# Patient Record
Sex: FEMALE | Race: WHITE | Employment: FULL TIME | URBAN - METROPOLITAN AREA
[De-identification: names, ages, dates, MRNs, and addresses within clinical notes are randomized per-mention and may not be internally consistent; named-entity substitution may affect disease eponyms.]

---

## 2020-02-21 ENCOUNTER — OFFICE VISIT (OUTPATIENT)
Dept: ENT CLINIC | Age: 30
End: 2020-02-21
Payer: COMMERCIAL

## 2020-02-21 VITALS
WEIGHT: 113 LBS | DIASTOLIC BLOOD PRESSURE: 72 MMHG | HEIGHT: 68 IN | TEMPERATURE: 97.9 F | SYSTOLIC BLOOD PRESSURE: 109 MMHG | HEART RATE: 76 BPM | BODY MASS INDEX: 17.13 KG/M2

## 2020-02-21 PROCEDURE — 31575 DIAGNOSTIC LARYNGOSCOPY: CPT | Performed by: OTOLARYNGOLOGY

## 2020-02-21 PROCEDURE — 99203 OFFICE O/P NEW LOW 30 MIN: CPT | Performed by: OTOLARYNGOLOGY

## 2020-02-21 NOTE — PROGRESS NOTES
Minutes per session: Not on file    Stress: Not on file   Relationships    Social connections:     Talks on phone: Not on file     Gets together: Not on file     Attends Mosque service: Not on file     Active member of club or organization: Not on file     Attends meetings of clubs or organizations: Not on file     Relationship status: Not on file    Intimate partner violence:     Fear of current or ex partner: Not on file     Emotionally abused: Not on file     Physically abused: Not on file     Forced sexual activity: Not on file   Other Topics Concern    Not on file   Social History Narrative    Not on file       DRUG/FOOD ALLERGIES: Patient has no known allergies. CURRENT MEDICATIONS  Prior to Admission medications    Not on File       REVIEW OF SYSTEMS  The following systems were reviewed and revealed the following in addition to any already discussed in the HPI:    CONSTITUTIONAL: no weight loss, no fever, no night sweats, no chills  EYES: no vision changes, no blurry vision  EARS: no changes in hearing, no otalgia  NOSE: no epistaxis, no rhinorrhea  RESPIRATORY: no  Difficulty breathing, no shortness of breath  CV: no chest pain, no Peripheral vascular disease  HEME: No coagulation disorder, no Bleeding disorder  NEURO: no TIA or stroke-like symptoms  SKIN: No new rashes in the head and neck, no recent skin cancers  MOUTH: No new ulcers, no recent teeth infections  NECK: Abnormal sensation  GASTROINTESTINAL: Dysphasia  PSYCH: No anxiety, no depression      PHYSICAL EXAM  /72 (Site: Left Upper Arm, Position: Sitting, Cuff Size: Medium Adult)   Pulse 76   Temp 97.9 °F (36.6 °C) (Oral)   Ht 5' 8\" (1.727 m)   Wt 113 lb (51.3 kg)   BMI 17.18 kg/m²     GENERAL: No Acute Distress, Alert and Oriented, no Hoarseness, strong voice  EYES: EOMI, Anti-icteric  HENT:   Head: Normocephalic and atraumatic.    Face:  Symmetric, facial nerve intact, no sinus tenderness  Right Ear: Normal external ear, normal external auditory canal, intact tympanic membrane with normal mobility and aerated middle ear  Left Ear: Normal external ear, normal external auditory canal, intact tympanic membrane with normal mobility and aerated middle ear  Mouth/Oral Cavity:  normal lips, Uvula is midline, no mucosal lesions, no trismus, normal dentition, normal salivary quality/flow  Oropharynx/Larynx:  normal oropharynx, absent tonsils, see below  Nose:Normal external nasal appearance. Anterior rhinoscopy shows a rightward deviated septum. Normal turbinates. Normal mucosa   NECK: The patient has a normal range of motion of her neck. I do not see any visible asymmetry. She has some tenderness to palpation in the region of the thyroid, but I do not appreciate a israel thyroid mass. She is relatively thin and I do not appreciate any large lesions. CHEST: Normal respiratory effort, no retractions, breathing comfortably  SKIN: No rashes, normal appearing skin, no evidence of skin lesions/tumors  Neuro:  cranial nerve II-XII intact; normal gait  Cardio:  no edema      PROCEDURE  Flexible laryngoscopy  Afrin was applied the bilateral nasal cavity. Flexible scope was passed to the right nasal cavity. The nasopharynx was normal.  The patient had large lingual tonsils without mass or lesion. She had a little bit of erythema of the supraglottic structures however they appear to be normal with normal vocal cord mobility. Piriforms were normal.        ASSESSMENT/PLAN  1. Pharyngoesophageal dysphagia  I really not sure was causing her dysphasia and this odd sensation that something popped in her neck. This is unusual enough that I am going to evaluate the CT scan with contrast is to rule out something like a thyroid nodule that shifting. She will follow-up with me after the CT scan. - CT SOFT TISSUE NECK W CONTRAST; Future    2. Neck pain  Evaluate with a CT scan as I am not sure the etiology of this patient's complaints.   I think would

## 2020-02-26 ENCOUNTER — OFFICE VISIT (OUTPATIENT)
Dept: ENT CLINIC | Age: 30
End: 2020-02-26
Payer: COMMERCIAL

## 2020-02-26 ENCOUNTER — HOSPITAL ENCOUNTER (OUTPATIENT)
Dept: CT IMAGING | Age: 30
Discharge: HOME | End: 2020-02-26
Payer: COMMERCIAL

## 2020-02-26 VITALS
DIASTOLIC BLOOD PRESSURE: 69 MMHG | HEIGHT: 68 IN | HEART RATE: 75 BPM | WEIGHT: 119 LBS | TEMPERATURE: 97.4 F | SYSTOLIC BLOOD PRESSURE: 101 MMHG | BODY MASS INDEX: 18.04 KG/M2

## 2020-02-26 PROCEDURE — 6360000004 HC RX CONTRAST MEDICATION: Performed by: OTOLARYNGOLOGY

## 2020-02-26 PROCEDURE — 99213 OFFICE O/P EST LOW 20 MIN: CPT | Performed by: OTOLARYNGOLOGY

## 2020-02-26 PROCEDURE — 70491 CT SOFT TISSUE NECK W/DYE: CPT

## 2020-02-26 RX ORDER — OMEPRAZOLE 20 MG/1
20 CAPSULE, DELAYED RELEASE ORAL
Qty: 30 CAPSULE | Refills: 2 | Status: SHIPPED | OUTPATIENT
Start: 2020-02-26

## 2020-02-26 RX ADMIN — IOPAMIDOL 75 ML: 755 INJECTION, SOLUTION INTRAVENOUS at 07:42

## 2020-02-26 NOTE — PROGRESS NOTES
trismus, normal dentition, normal salivary quality/flow  Oropharynx/Larynx:  normal oropharynx, absent tonsils; normal larynx/nasopharynx on mirror exam  Nose:Normal external nasal appearance. Anterior rhinoscopy shows a normal septum. Normal turbinates. Normal mucosa   NECK: Normal range of motion, no thyromegaly, trachea is midline, no lymphadenopathy, no neck masses, no crepitus  CHEST: Normal respiratory effort, no retractions, breathing comfortably  SKIN: No rashes, normal appearing skin, no evidence of skin lesions/tumors  Neuro:  cranial nerve II-XII intact; normal gait  Cardio:  no edema        RADIOLOGY  Summary of findings:  I reviewed the CT scan of the neck she had today and there are no abnormalities noted she has large lingual tonsils as I noted on the flexible endoscopy performed in the last clinic. ASSESSMENT/PLAN  1. Pharyngoesophageal dysphagia  I do not see any concerning lesions on her CT scan. Sometimes lingual tonsillar hypertrophy and the edema noted in her supraglottic structures on my previous exam are secondary to reflux. I would like to try her on 6 weeks of omeprazole to see if it helps. I would like to see her afterwards to see if things are better. 2. Laryngopharyngeal reflux (LPR)  As above. I have performed a head and neck physical exam personally or was physically present during the key or critical portions of the service. Medical Decision Making:   The following items were considered in medical decision making:  Independent review of images  Review / order clinical lab tests  Review / order radiology tests  Decision to obtain old records

## 2020-09-02 ENCOUNTER — HOSPITAL ENCOUNTER (OUTPATIENT)
Dept: GENERAL RADIOLOGY | Age: 30
Discharge: HOME | End: 2020-09-02
Payer: COMMERCIAL

## 2020-09-02 ENCOUNTER — HOSPITAL ENCOUNTER (OUTPATIENT)
Age: 30
Discharge: HOME | End: 2020-09-02
Payer: COMMERCIAL

## 2020-09-02 PROCEDURE — 71046 X-RAY EXAM CHEST 2 VIEWS: CPT

## 2020-09-21 ENCOUNTER — EMPLOYEE WELLNESS (OUTPATIENT)
Dept: OTHER | Age: 30
End: 2020-09-21

## 2020-09-21 LAB
CHOLESTEROL, TOTAL: 190 MG/DL (ref 0–199)
GLUCOSE BLD-MCNC: 84 MG/DL (ref 70–99)
HDLC SERPL-MCNC: 78 MG/DL (ref 40–60)
LDL CHOLESTEROL CALCULATED: 101 MG/DL
TRIGL SERPL-MCNC: 54 MG/DL (ref 0–150)

## 2020-10-19 VITALS — WEIGHT: 116 LBS | BODY MASS INDEX: 17.64 KG/M2

## 2020-10-23 ENCOUNTER — NURSE TRIAGE (OUTPATIENT)
Dept: OTHER | Facility: CLINIC | Age: 30
End: 2020-10-23

## 2020-10-23 NOTE — TELEPHONE ENCOUNTER
symptoms? \" (e.g., dizziness, nausea, vomiting, sweating, fever, difficulty breathing, cough)        Denies dizziness, SOB, nausea, vomiting, fever  The cough is sporadic    11. PREGNANCY: \"Is there any chance you are pregnant? \" \"When was your last menstrual period? \"        n/a    Protocols used: CHEST PAIN-ADULT-[place]

## 2020-11-17 ENCOUNTER — HOSPITAL ENCOUNTER (OUTPATIENT)
Dept: WOMENS IMAGING | Age: 30
Discharge: HOME | End: 2020-11-17
Payer: COMMERCIAL

## 2020-11-17 PROCEDURE — 77080 DXA BONE DENSITY AXIAL: CPT

## 2021-03-12 ENCOUNTER — EVALUATION (OUTPATIENT)
Dept: PHYSICAL THERAPY | Age: 31
End: 2021-03-12
Payer: COMMERCIAL

## 2021-03-12 DIAGNOSIS — M25.561 ACUTE PAIN OF RIGHT KNEE: Primary | ICD-10-CM

## 2021-03-12 DIAGNOSIS — M22.2X1 PATELLOFEMORAL PAIN SYNDROME OF RIGHT KNEE: ICD-10-CM

## 2021-03-12 DIAGNOSIS — R26.2 DIFFICULTY WALKING: ICD-10-CM

## 2021-03-12 PROCEDURE — 97110 THERAPEUTIC EXERCISES: CPT | Performed by: PHYSICAL THERAPIST

## 2021-03-12 PROCEDURE — 97112 NEUROMUSCULAR REEDUCATION: CPT | Performed by: PHYSICAL THERAPIST

## 2021-03-12 PROCEDURE — 97162 PT EVAL MOD COMPLEX 30 MIN: CPT | Performed by: PHYSICAL THERAPIST

## 2021-03-12 PROCEDURE — 97530 THERAPEUTIC ACTIVITIES: CPT | Performed by: PHYSICAL THERAPIST

## 2021-03-12 NOTE — PROGRESS NOTES
[facility], [place]   Phone: [phone]    Fax: [phone]      Physical Therapy Treatment Note/ Progress Report:       Date:  3/12/2021    Patient Name:  Asya Romero    :  1990  MRN: <[id number]>  Restrictions/Precautions:    Medical/Treatment Diagnosis Information:  Diagnosis: R patella tendonitis     Insurance/Certification information:  PT Insurance Information: [address] [place] [address],[address]  Physician Information:  Referring Practitioner: Dr Victorina Acevedo  Has the plan of care been signed (Y/N):        []  Yes  [x]  No 3/12/21    Date of Patient follow up with Physician:  3/15/21      Is this a Progress Report:     []  Yes  [x]  No        If Yes:  Date Range for reporting period:  Beginning 3/12/21  Ending 21    Progress report will be due (10 Rx or 30 days whichever is less): 3/38/84       Recertification will be due (POC Duration  / 90 days whichever is less): 21         Visit # Insurance Allowable Auth Required   1 30 []  Yes []  No        Functional Scale: LEFI 43/80=53.5%   Date assessed:  3/12/2021      Latex Allergy:  [x]NO      []YES  Preferred Language for Healthcare:   [x]English       []other:    Pain level:  3/10     SUBJECTIVE:  See eval    OBJECTIVE: See eval   Observation:    Test measurements:      RESTRICTIONS/PRECAUTIONS: COVID 19; multiple fractures -17 over the years;     Exercises/Interventions:   Exercise/Equipment Resistance/Repetitions Other comments   Cardio/Warm-up     Bike     Treadmill          Stretching     Hamstring Seated 10\"x10    Hip Flexion     ITB W/ towel 10\"x10    Grion     Quad     Inclined Calf 10\"x10    Towel Pull          ROM     Passive     Active     Weight Shift     Weight Hangs     Sheet Pulls     Ankle Pumps           Patellar Glides     Medial     Superior     Inferior          STRENGTH     SLR     Supine 3x10    Prone 2x10    Abduction 2x10    Adducton 2x10    SLR+          Isometrics     Quad sets 10\"x10 over foam roll CKC     Calf raises x30    Wall sits     Step ups     1 leg stand     Squatting     CC TKE     Balance SLS 30\"x2 Cuing on foot position        PRE     Extension  RANGE:   Flexion  RANGE:   Leg Press  RANGE:        Cable Column     Ed given on HEP, POC & expectations x10'    Review of brace fitting x5'    Manual/Modalities                 Therapeutic Exercise and NMR EXR  [x] (64405) Provided verbal/tactile cueing for activities related to strengthening, flexibility, endurance, ROM for improvements in LE, proximal hip, and core control with self care, mobility, lifting, ambulation.  [] (42880) Provided verbal/tactile cueing for activities related to improving balance, coordination, kinesthetic sense, posture, motor skill, proprioception  to assist with LE, proximal hip, and core control in self care, mobility, lifting, ambulation and eccentric single leg control. NMR and Therapeutic Activities:    [x] (44818 or 38140) Provided verbal/tactile cueing for activities related to improving balance, coordination, kinesthetic sense, posture, motor skill, proprioception and motor activation to allow for proper function of core, proximal hip and LE with self care and ADLs  [] (94312) Gait Re-education- Provided training and instruction to the patient for proper LE, core and proximal hip recruitment and positioning and eccentric body weight control with ambulation re-education including up and down stairs     Home Exercise Program:    [x] (33019) Reviewed/Progressed HEP activities related to strengthening, flexibility, endurance, ROM of core, proximal hip and LE for functional self-care, mobility, lifting and ambulation/stair navigation            Written HEP est    Access Code: [email or web address]   URL: [email or web address]   Date: 03/12/2021   Prepared by: Courtney Donohue     Exercises   Gastroc Stretch on Wall - 10 reps - 1 sets - 10 hold - 1x daily - 7x weekly   Seated Table Hamstring Stretch - 10 reps - 1 sets - 10 hold - 1x daily - 7x weekly   Supine ITB Stretch with Strap - 10 reps - 1 sets - 10 hold - 1x daily - 7x weekly   Supine Quadricep Sets - 10 reps - 1 sets - 10 hold - 1x daily - 7x weekly   Small Range Straight Leg Raise - 10 reps - 3 sets - 1x daily - 7x weekly   Sidelying Hip Abduction - 10 reps - 3 sets - 1x daily - 7x weekly   Sidelying Hip Adduction - 10 reps - 3 sets - 1x daily - 7x weekly   Prone Hip Extension - 10 reps - 3 sets - 1x daily - 7x weekly   Standing Heel Raise - 10 reps - 3 sets - 1x daily - 7x weekly   Single Leg Stance - 1 reps - 3 sets - 30 hold - 1x daily - 7x weekly      [] (25073)Reviewed/Progressed HEP activities related to improving balance, coordination, kinesthetic sense, posture, motor skill, proprioception of core, proximal hip and LE for self care, mobility, lifting, and ambulation/stair navigation      Manual Treatments:  PROM / STM / Oscillations-Mobs:  G-I, II, III, IV (PA's, Inf., Post.)  [] (13798) Provided manual therapy to mobilize LE, proximal hip and/or LS spine soft tissue/joints for the purpose of modulating pain, promoting relaxation,  increasing ROM, reducing/eliminating soft tissue swelling/inflammation/restriction, improving soft tissue extensibility and allowing for proper ROM for normal function with self care, mobility, lifting and ambulation. Modalities:  CP x10 min   [] GAME READY (VASO)- for significant edema, swelling, pain control.      Charges:  Timed Code Treatment Minutes: 40   Total Treatment Minutes: 70     [] EVAL (LOW) 92713 (typically 20 minutes face-to-face)  [x] EVAL (MOD) 11070 (typically 30 minutes face-to-face)  [] EVAL (HIGH) 60023 (typically 45 minutes face-to-face)  [] RE-EVAL   [x] UX(34085) 1 x 15    [] IONTO  [x] NMR (85847) 1 x 10    [] VASO  [] Manual (50694) x      [] Other:  [x] TA (51959) 1 x 15 min     [] Mech Traction (90331)  [] ES(attended) (88620)      [] ES (un) (49769):     GOALS:  Patient stated goal: Full ROM w/ activity and not have to wear brace  []? Progressing: []? Met: []? Not Met: []? Adjusted     Therapist goals for Patient:   Short Term Goals: To be achieved in: 2 weeks  1. Independent in HEP and progression per patient tolerance, in order to prevent re-injury. []? Progressing: []? Met: []? Not Met: []? Adjusted   2. Patient will have a decrease in pain to facilitate improvement in movement, function, and ADLs as indicated by Functional Deficits. []? Progressing: []? Met: []? Not Met: []? Adjusted      Long Term Goals: To be achieved in: 12 weeks  1. Disability index score of 10% or less for the LEFS to assist with reaching prior level of function. []? Progressing: []? Met: []? Not Met: []? Adjusted   2. Patient will demonstrate increased AROM to WNL's to allow for proper joint functioning as indicated by patients Functional Deficits. []? Progressing: []? Met: []? Not Met: []? Adjusted   3. Patient will demonstrate an increase in Strength to good proximal hip strength and control, within 5lb HHD in LE to allow for proper functional mobility as indicated by patients Functional Deficits. []? Progressing: []? Met: []? Not Met: []? Adjusted   4. Patient will return to 100% functional activities without increased symptoms or restriction. []? Progressing: []? Met: []? Not Met: []? Adjusted   5. Pt will be able to return to playing volleyball and sand volleyball. (patient specific functional goal)    []? Progressing: []? Met: []? Not Met: []? Adjusted     Overall Progression Towards Functional goals/ Treatment Progress Update:  [] Patient is progressing as expected towards functional goals listed. [] Progression is slowed due to complexities/Impairments listed. [] Progression has been slowed due to co-morbidities.   [x] Plan just implemented, too soon to assess goals progression <30days   [] Goals require adjustment due to lack of progress  [] Patient is not progressing as expected and requires additional follow up with physician  [] Other    ASSESSMENT:  See eval    Treatment/Activity Tolerance:  [] Patient tolerated treatment well [x] Patient limited by fatique  [x] Patient limited by pain  [] Patient limited by other medical complications  [] Other:     Prognosis: [x] Good [] Fair  [] Poor    Patient Requires Follow-up: [x] Yes  [] No    PLAN: See eval  [x] Continue per plan of care [] Alter current plan (see comments)  [x] Plan of care initiated [] Hold pending MD visit [] Discharge    Electronically signed by: Mary Albetrs PT, MS, OMT-C    Physical Therapist [place] license #[id number]  Physical Therapist [place] license #[id number]    Note: If patient does not return for scheduled/ recommended follow up visits, this note will serve as a discharge from care along with most recent update on progress.

## 2021-03-12 NOTE — PROGRESS NOTES
[facility]   Phone: [phone]    Fax: [phone]          Physical Therapy Certification    Dear Referring Practitioner: Dr Nikolai Ervin,    We had the pleasure of evaluating the following patient for physical therapy services at [address] [place] [address]. A summary of our findings can be found in the initial assessment below. This includes our plan of care. If you have any questions or concerns regarding these findings, please do not hesitate to contact me at the office phone number checked above. Thank you for the referral.       Physician Signature:_______________________________Date:__________________  By signing above (or electronic signature), therapists plan is approved by physician      Patient: Zach Barnes   : 1990   MRN: <[id number]>  Referring Physician: Referring Practitioner: Dr Nikolai Ervin      Evaluation Date: 3/12/2021      Medical Diagnosis Information:  Diagnosis: R patella tendonitis                                             Insurance information: PT Insurance Information: [employer]     Precautions/ Contra-indications: none  Latex Allergy:  [x]NO      []YES  Preferred Language for Healthcare:   [x]English       []Other:    C-SSRS Triggered by Intake questionnaire (Past 2 wk assessment):   [x] No, Questionnaire did not trigger screening.   [] Yes, Patient intake triggered further evaluation      [] C-SSRS Screening completed  [] PCP notified via Plan of Care  [] Emergency services notified     Additional Information about patients that tested positive for w/ COVID 19:     Date of onset:  10/8/2020     Severity of symptoms: [] Asymptomatic   [] Mild      [x] Moderate      [] Severe    How long did your symptoms last? 10 days    What were your symptoms? (cardiac, fatigue, pulmonary, headache, etc) strong cough that resulted in fractured rib; Was any treatment rendered?  Over the counter    Blood Pressure:107/74  Heart Rate: 76 bpm  Oxygen levels:    Fully recovered from Covid 19? yes    Involved in any cardio program since illness? List your tolerance to cardio program:yes, returned to snowboarding, walking dog and sand volleyball;     SUBJECTIVE: Patient stated complaint: Pt had R knee buckle w/ pain x1 wk while standing. She has intense pain in knee when it mark anthony. She has been wearing knee brace that has provided some support. Pt does have mod apprehension w/ movement of knee from ext to flex. Min edema w/ pt icing. Pt lives in home w/ 20 steps and others to assist as needed;    Relevant Medical History:mulitple fractures in toes, ankles, elbows, spine and rib;  Covid 19;  Functional Disability Index:  WILLIAM 43/80=53.5%    Pain Scale: 6/10  Easing factors: brace; ibuprofen; icing;  Provocative factors: moving from flex into ext and vice versa; steps, walking dog; volleyball; snowboarding;    Type: []Constant   [x]Intermittent  []Radiating [x]Localized []other:     Numbness/Tingling: denies n/t;    Occupation/School: radiology tech; [place]; volleyball;    Living Status/Prior Level of Function: Independent with ADLs and IADLs, no limitations prior to onset of injury this week but very intense pain that pt has shut down her activities; she has limited walking her dog and found replacement for sand volleyball;    OBJECTIVE:   Flexibility L R Comment   Hamstring min mod 3/12/2021   Gastroc Min  mod    ITB min mod    Quad                ROM PROM AROM Overpressure Comment    L R L R L R 3/12/2021   Flexion   145 140      Extension   0 0                              Strength L R Comment   Quad 37.7 lb 36.6 lb 3/12/2021   Hamstring 33.4 lb 31.6 lb    Gastroc                      Special Test Results/Comment   Meniscal Click Neg but guarding   Crepitus neg   Flexion Test    Valgus Laxity neg   Varus Laxity neg   Lachmans neg   Drop Back      Reflexes/Sensation:    [x]Dermatomes/Myotomes intact    [x]Reflexes equal and normal bilaterally   []Other:    Joint mobility:   []Normal    []Hypo   [x]Hyper    Palpation: TTP along lat R knee around patella; no edema noted; min tightness in lat thigh; Functional Mobility/Transfers: indep w/o deviations; Posture: kyphotic posture; ectomorph body structure w/ significant muscle atrophy    Bandages/Dressings/Incisions: n/a;    Gait: (include devices/WB status) amb w/ shortened stride length, dec stance time on R LE;    Orthopedic Special Tests: see above                       [x] Patient history, allergies, meds reviewed. Medical chart reviewed. See intake form. Review Of Systems (ROS):  [x]Performed Review of systems (Integumentary, CardioPulmonary, Neurological) by intake and observation. Intake form has been scanned into medical record. Patient has been instructed to contact their primary care physician regarding ROS issues if not already being addressed at this time.       Co-morbidities/Complexities (which will affect course of rehabilitation):   [x]None           Arthritic conditions   []Rheumatoid arthritis (M05.9)  []Osteoarthritis (M19.91)   Cardiovascular conditions   []Hypertension (I10)  []Hyperlipidemia (E78.5)  []Angina pectoris (I20)  []Atherosclerosis (I70)   Musculoskeletal conditions   []Disc pathology   []Congenital spine pathologies   []Prior surgical intervention  []Osteoporosis (M81.8)  []Osteopenia (M85.8)    Multiple fractures   Endocrine conditions   []Hypothyroid (E03.9)  []Hyperthyroid Gastrointestinal conditions   []Constipation (W83.85)   Metabolic conditions   []Morbid obesity (E66.01)  []Diabetes type 1(E10.65) or 2 (E11.65)   []Neuropathy (G60.9)     Pulmonary conditions   []Asthma (J45)  []Coughing   []COPD (J44.9)   Psychological Disorders  []Anxiety (F41.9)  []Depression (F32.9)   []Other:   []Other:     COVID 19 in 10/2020     Barriers to/and or personal factors that will affect rehab potential:              []Age  []Sex              [x]Motivation/Lack of Motivation                        [x]Co-Morbidities              [x]Cognitive Function, education/learning barriers              []Environmental, home barriers              []profession/work barriers  []past PT/medical experience  []other:  Justification:     Falls Risk Assessment (30 days):   [x] Falls Risk assessed and no intervention required. [] Falls Risk assessed and Patient requires intervention due to being higher risk   TUG score (>12s at risk):     [] Falls education provided, including         ASSESSMENT: Pt is 27 y/o female w/ 1 week history of R knee pain. Pt has tightness in R LE vs L, weakness in muscles, apprehension w/ moving knee, gt deviations and pain resulting in dec function. Pt highly motivated to get back to full activities. Pt should benefit from therapy to address poor patella control w/ possible subluxation.       Functional Impairments:     []Noted lumbar/proximal hip/LE joint hypomobility   [x]Decreased LE functional ROM   [x]Decreased core/proximal hip strength and neuromuscular control   [x]Decreased LE functional strength   [x]Reduced balance/proprioceptive control   []other:      Functional Activity Limitations (from functional questionnaire and intake)   [x]Reduced ability to tolerate prolonged functional positions   [x]Reduced ability or difficulty with changes of positions or transfers between positions   [x]Reduced ability to maintain good posture and demonstrate good body mechanics with sitting, bending, and lifting   [x]Reduced ability to sleep- turning over   [] Reduced ability or tolerance with driving and/or computer work   [x]Reduced ability to perform lifting, carrying tasks   [x]Reduced ability to squat   [x]Reduced ability to forward bend   [x]Reduced ability to ambulate prolonged functional periods/distances/surfaces   [x]Reduced ability to ascend/descend stairs   []Reduced ability to run, hop, cut or jump   []other:    Participation Restrictions   [x]Reduced participation in self care activities   [x]Reduced participation in home management activities   [x]Reduced participation in work activities   [x]Reduced participation in social activities. [x]Reduced participation in sport/recreation activities. Classification :    []Signs/symptoms consistent with post-surgical status including decreased ROM, strength and function.    []Signs/symptoms consistent with joint sprain/strain   [x]Signs/symptoms consistent with patella-femoral syndrome   []Signs/symptoms consistent with knee OA/hip OA   []Signs/symptoms consistent with internal derangement of knee/Hip   [x]Signs/symptoms consistent with functional hip weakness/NMR control      []Signs/symptoms consistent with tendinitis/tendinosis    []signs/symptoms consistent with pathology which may benefit from Dry needling      []other:      Prognosis/Rehab Potential:      [x]Excellent   [x]Good    []Fair   []Poor    Tolerance of evaluation/treatment:    [x]Excellent   [x]Good    []Fair   []Poor    Physical Therapy Evaluation Complexity Justification  [x] A history of present problem with:  [] no personal factors and/or comorbidities that impact the plan of care;  [x]1-2 personal factors and/or comorbidities that impact the plan of care  []3 personal factors and/or comorbidities that impact the plan of care  [x] An examination of body systems using standardized tests and measures addressing any of the following: body structures and functions (impairments), activity limitations, and/or participation restrictions;:  [] a total of 1-2 or more elements   [] a total of 3 or more elements   [x] a total of 4 or more elements   [x] A clinical presentation with:  [] stable and/or uncomplicated characteristics   [x] evolving clinical presentation with changing characteristics  [] unstable and unpredictable characteristics;   [x] Clinical decision making of [] low, [x] moderate, [] high complexity using standardized patient assessment instrument and/or measurable assessment of functional outcome. [] EVAL (LOW) 41159 (typically 20 minutes face-to-face)  [x] EVAL (MOD) 22001 (typically 30 minutes face-to-face)  [] EVAL (HIGH) 27504 (typically 45 minutes face-to-face)  [] RE-EVAL     PLAN:   Frequency/Duration:  1-2 days per week for 6 Weeks:  Interventions:  [x]  Therapeutic exercise including: strength training, ROM, for Lower extremity and core   [x]  NMR activation and proprioception for LE, Glutes and Core   [x]  Manual therapy as indicated for LE, Hip and spine to include: Dry Needling/IASTM, STM, PROM, Gr I-IV mobilizations, manipulation. [x] Modalities as needed that may include: thermal agents, E-stim, Biofeedback, US, iontophoresis as indicated  [x] Patient education on joint protection, postural re-education, activity modification, progression of HEP. HEP instruction: Instructed patient in a HEP. Patient demonstrated exercises correctly. Handout with  pictures and # of reps/sets was given to pt. Exercises included those listed above. PT's business card with information given to pt for any questions or problems that may occur before next visit. GOALS:  Patient stated goal: Full ROM w/ activity and not have to wear brace  [] Progressing: [] Met: [] Not Met: [] Adjusted    Therapist goals for Patient:   Short Term Goals: To be achieved in: 2 weeks  1. Independent in HEP and progression per patient tolerance, in order to prevent re-injury. [] Progressing: [] Met: [] Not Met: [] Adjusted   2. Patient will have a decrease in pain to facilitate improvement in movement, function, and ADLs as indicated by Functional Deficits. [] Progressing: [] Met: [] Not Met: [] Adjusted     Long Term Goals: To be achieved in: 12 weeks  1. Disability index score of 10% or less for the LEFS to assist with reaching prior level of function. [] Progressing: [] Met: [] Not Met: [] Adjusted   2.  Patient will demonstrate increased AROM to WNL's to allow for proper joint functioning as indicated by patients Functional Deficits. [] Progressing: [] Met: [] Not Met: [] Adjusted   3. Patient will demonstrate an increase in Strength to good proximal hip strength and control, within 5lb HHD in LE to allow for proper functional mobility as indicated by patients Functional Deficits. [] Progressing: [] Met: [] Not Met: [] Adjusted   4. Patient will return to 100% functional activities without increased symptoms or restriction. [] Progressing: [] Met: [] Not Met: [] Adjusted   5.  Pt will be able to return to playing volleyball and sand volleyball. (patient specific functional goal)    [] Progressing: [] Met: [] Not Met: [] Adjusted     Electronically signed by:  Marizol Rojas PT, MS, OMT-C    Physical Therapist [place] license #[id number]  Physical Therapist [place] license #[id number]

## 2021-04-05 ENCOUNTER — TREATMENT (OUTPATIENT)
Dept: PHYSICAL THERAPY | Age: 31
End: 2021-04-05
Payer: COMMERCIAL

## 2021-04-05 DIAGNOSIS — M22.2X1 PATELLOFEMORAL PAIN SYNDROME OF RIGHT KNEE: ICD-10-CM

## 2021-04-05 DIAGNOSIS — M25.561 ACUTE PAIN OF RIGHT KNEE: Primary | ICD-10-CM

## 2021-04-05 DIAGNOSIS — R26.2 DIFFICULTY WALKING: ICD-10-CM

## 2021-04-05 PROCEDURE — 97110 THERAPEUTIC EXERCISES: CPT | Performed by: PHYSICAL THERAPIST

## 2021-04-05 PROCEDURE — 97530 THERAPEUTIC ACTIVITIES: CPT | Performed by: PHYSICAL THERAPIST

## 2021-04-05 PROCEDURE — 97112 NEUROMUSCULAR REEDUCATION: CPT | Performed by: PHYSICAL THERAPIST

## 2021-04-05 NOTE — PROGRESS NOTES
[email or web address]   Phone: [phone]    Fax: [phone]      Physical Therapy Treatment Note/ Progress Report:       Date:  2021    Patient Name:  Mercy Mccabe    :  1990  MRN: <[id number]>  Restrictions/Precautions:    Medical/Treatment Diagnosis Information:  Diagnosis: R patella tendonitis     Insurance/Certification information:  PT Insurance Information: [address] [place] [address],[address]  Physician Information:     Has the plan of care been signed (Y/N):        []  Yes  [x]  No 3/12/21    Date of Patient follow up with Physician:  3/15/21      Is this a Progress Report:     []  Yes  [x]  No        If Yes:  Date Range for reporting period:  Beginning 3/12/21  Ending 21    Progress report will be due (10 Rx or 30 days whichever is less):        Recertification will be due (POC Duration  / 90 days whichever is less): 21         Visit # Insurance Allowable Auth Required   2 30 []  Yes []  No        Functional Scale: LEFI 43/80=53.5%   Date assessed:  3/12/2021      Latex Allergy:  [x]NO      []YES  Preferred Language for Healthcare:   [x]English       []other:    Pain level:  3/10     SUBJECTIVE:  Pt went to [facility] follow up w/ MRI and arthrogram recommended but denied by insurance. She has to have PT before those tests will be covered. Knee still locks at times when changing positions. She is wearing brace reg. She has started on NSAID that has not helped a lot. She is back to work w/ min problems. OBJECTIVE: See eval   Observation: knee stabilizing brace donned; min to no edema noted; TTP along lat, inferior patella region; tightness noted in calf muscles on R;       Test measurements:      RESTRICTIONS/PRECAUTIONS: COVID 19; multiple fractures -17 over the years;     Exercises/Interventions:   Exercise/Equipment Resistance/Repetitions Other comments   Cardio/Warm-up     Bike     Treadmill          Stretching     Hamstring Seated 10\"x10    Hip Flexion ITB W/ towel 10\"x10    Grion     Quad     Inclined Calf 10\"x10    Towel Pull          ROM     Passive     Active     Weight Shift     Weight Hangs     Sheet Pulls     Ankle Pumps           Patellar Glides     Medial     Superior     Inferior          STRENGTH     SLR     Supine 1# 3x10 VMO focus   Prone 1# 3x10    Abduction- clams Green TB 3x10    Adducton/ PS + bridge 10\"x10 Arms across chest   SLR+          Isometrics     Quad sets 10\"x10 over foam roll         CKC     Calf raises x30 double; x10 single    Wall sits x3 to fatigue    Step ups     1 leg stand     Squatting     CC TKE     Balance SLS 30\"x2 ec, Cuing on foot position        PRE     Extension  RANGE:   Flexion  RANGE:   SLR+  30\"+5 pumps for 3 reps         Leg Press  RANGE:        Cable Column     Ed given on HEP, POC & expectations X10'; 4/5/21 reviewed and updated    Review of brace fitting x5'    Manual/Modalities                 Therapeutic Exercise and NMR EXR  [x] (27621) Provided verbal/tactile cueing for activities related to strengthening, flexibility, endurance, ROM for improvements in LE, proximal hip, and core control with self care, mobility, lifting, ambulation.  [] (64515) Provided verbal/tactile cueing for activities related to improving balance, coordination, kinesthetic sense, posture, motor skill, proprioception  to assist with LE, proximal hip, and core control in self care, mobility, lifting, ambulation and eccentric single leg control.      NMR and Therapeutic Activities:    [x] (35782 or 18375) Provided verbal/tactile cueing for activities related to improving balance, coordination, kinesthetic sense, posture, motor skill, proprioception and motor activation to allow for proper function of core, proximal hip and LE with self care and ADLs  [] (13130) Gait Re-education- Provided training and instruction to the patient for proper LE, core and proximal hip recruitment and positioning and eccentric body weight control with ambulation re-education including up and down stairs     Home Exercise Program:    [x] (61787) Reviewed/Progressed HEP activities related to strengthening, flexibility, endurance, ROM of core, proximal hip and LE for functional self-care, mobility, lifting and ambulation/stair navigation            Written HEP est    Access Code: [email or web address]: 04/05/2021Prepared by: Kashmir Guzmán   Gastroc Stretch on Wall - 1 x daily - 7 x weekly - 10 reps - 1 sets - 10 hold   Seated Table Hamstring Stretch - 1 x daily - 7 x weekly - 10 reps - 1 sets - 10 hold   Supine ITB Stretch with Strap - 1 x daily - 7 x weekly - 10 reps - 1 sets - 10 hold   Supine Quadricep Sets - 1 x daily - 7 x weekly - 10 reps - 1 sets - 10 hold   Small Range Straight Leg Raise - 1 x daily - 7 x weekly - 10 reps - 3 sets   Supine Bridge with Mini Swiss Ball Between Knees - 1 x daily - 7 x weekly - 1 sets - 10 reps - 10 hold   Clamshell with Resistance - 1 x daily - 7 x weekly - 10 reps - 3 sets   Prone Hip Extension - 1 x daily - 7 x weekly - 10 reps - 3 sets   Standing Heel Raise - 1 x daily - 7 x weekly - 10 reps - 3 sets   Single Leg Stance - 1 x daily - 7 x weekly - 1 reps - 3 sets - 30 hold   Wall Squat - 1 x daily - 7 x weekly - 1 sets - 3 reps - 30 hold   Small Range Straight Leg Raise - 1 x daily - 7 x weekly - 1 sets - 3-10 reps - 30 hold        [] (33456)Reviewed/Progressed HEP activities related to improving balance, coordination, kinesthetic sense, posture, motor skill, proprioception of core, proximal hip and LE for self care, mobility, lifting, and ambulation/stair navigation      Manual Treatments:  PROM / STM / Oscillations-Mobs:  G-I, II, III, IV (PA's, Inf., Post.)  [] (88162) Provided manual therapy to mobilize LE, proximal hip and/or LS spine soft tissue/joints for the purpose of modulating pain, promoting relaxation,  increasing ROM, reducing/eliminating soft tissue swelling/inflammation/restriction, improving soft tissue extensibility and allowing for proper ROM for normal function with self care, mobility, lifting and ambulation. Modalities:  CP x10 min   [] GAME READY (VASO)- for significant edema, swelling, pain control. Charges:  Timed Code Treatment Minutes: 50   Total Treatment Minutes: 70     [] EVAL (LOW) 21515 (typically 20 minutes face-to-face)  [] EVAL (MOD) 61705 (typically 30 minutes face-to-face)  [] EVAL (HIGH) 34815 (typically 45 minutes face-to-face)  [] RE-EVAL   [x] ST(24406) 1 x 20    [] IONTO  [x] NMR (69484) 1 x 15    [] VASO  [] Manual (37387) x      [] Other:  [x] TA (60458) 1 x 15 min     [] Mech Traction (64238)  [] ES(attended) (42667)      [] ES (un) (97246):     GOALS:  Patient stated goal: Full ROM w/ activity and not have to wear brace  []? Progressing: []? Met: []? Not Met: []? Adjusted     Therapist goals for Patient:   Short Term Goals: To be achieved in: 2 weeks  1. Independent in HEP and progression per patient tolerance, in order to prevent re-injury. []? Progressing: []? Met: []? Not Met: []? Adjusted   2. Patient will have a decrease in pain to facilitate improvement in movement, function, and ADLs as indicated by Functional Deficits. []? Progressing: []? Met: []? Not Met: []? Adjusted      Long Term Goals: To be achieved in: 12 weeks  1. Disability index score of 10% or less for the LEFS to assist with reaching prior level of function. []? Progressing: []? Met: []? Not Met: []? Adjusted   2. Patient will demonstrate increased AROM to WNL's to allow for proper joint functioning as indicated by patients Functional Deficits. []? Progressing: []? Met: []? Not Met: []? Adjusted   3. Patient will demonstrate an increase in Strength to good proximal hip strength and control, within 5lb HHD in LE to allow for proper functional mobility as indicated by patients Functional Deficits. []? Progressing: []? Met: []?  Not Met: []?

## 2021-04-07 ENCOUNTER — TREATMENT (OUTPATIENT)
Dept: PHYSICAL THERAPY | Age: 31
End: 2021-04-07
Payer: COMMERCIAL

## 2021-04-07 DIAGNOSIS — M22.2X1 PATELLOFEMORAL PAIN SYNDROME OF RIGHT KNEE: ICD-10-CM

## 2021-04-07 DIAGNOSIS — M25.561 ACUTE PAIN OF RIGHT KNEE: Primary | ICD-10-CM

## 2021-04-07 DIAGNOSIS — R26.2 DIFFICULTY WALKING: ICD-10-CM

## 2021-04-07 PROCEDURE — 97530 THERAPEUTIC ACTIVITIES: CPT | Performed by: PHYSICAL THERAPIST

## 2021-04-07 PROCEDURE — 97110 THERAPEUTIC EXERCISES: CPT | Performed by: PHYSICAL THERAPIST

## 2021-04-07 PROCEDURE — 97112 NEUROMUSCULAR REEDUCATION: CPT | Performed by: PHYSICAL THERAPIST

## 2021-04-07 NOTE — PROGRESS NOTES
[facility]   Phone: [phone]    Fax: [phone]      Physical Therapy Treatment Note/ Progress Report:       Date:  2021    Patient Name:  Jose Alberto Curtis    :  1990  MRN: <[id number]>  Restrictions/Precautions:    Medical/Treatment Diagnosis Information:  Diagnosis: R patella tendonitis     Insurance/Certification information:  PT Insurance Information: [address] [place] [address],[address]  Physician Information:     Has the plan of care been signed (Y/N):        []  Yes  [x]  No 3/12/21    Date of Patient follow up with Physician:  3/15/21      Is this a Progress Report:     []  Yes  [x]  No        If Yes:  Date Range for reporting period:  Beginning 3/12/21  Ending 21    Progress report will be due (10 Rx or 30 days whichever is less): 36       Recertification will be due (POC Duration  / 90 days whichever is less): 21         Visit # Insurance Allowable Auth Required   3 30 []  Yes []  No        Functional Scale: LEFI 43/80=53.5%   Date assessed:  3/12/2021      Latex Allergy:  [x]NO      []YES  Preferred Language for Healthcare:   [x]English       []other:    Pain level:  3/10     SUBJECTIVE:  Pt has been sore in thigh after last session. She did have knee \"lock up\" at work and then it was sore the rest of the night. She did exercises last night before bed and did not have any pain or swelling. Wearing brace as directed.      OBJECTIVE: See eval   Observation: knee stabilizing brace donned; min to no edema noted; TTP along lat, inferior patella region; tightness noted in calf muscles on R;    Test measurements:       Flexibility L R Comment   Hamstring min mod 3/12/2021   Gastroc Min  mod     ITB min mod     Quad                                   ROM PROM AROM Overpressure Comment     L R L R L R 2021   Flexion     145 145         Extension     0 0                                                   Strength L R Comment   Quad 37.7 lb 36.6 lb 3/12/2021 related to improving balance, coordination, kinesthetic sense, posture, motor skill, proprioception and motor activation to allow for proper function of core, proximal hip and LE with self care and ADLs  [] (52260) Gait Re-education- Provided training and instruction to the patient for proper LE, core and proximal hip recruitment and positioning and eccentric body weight control with ambulation re-education including up and down stairs     Home Exercise Program:    [x] (38222) Reviewed/Progressed HEP activities related to strengthening, flexibility, endurance, ROM of core, proximal hip and LE for functional self-care, mobility, lifting and ambulation/stair navigation            Written HEP est    Access Code: [email or web address]: [email or web address]: 04/05/2021Prepared by: Liana Navas   Gastroc Stretch on Wall - 1 x daily - 7 x weekly - 10 reps - 1 sets - 10 hold   Seated Table Hamstring Stretch - 1 x daily - 7 x weekly - 10 reps - 1 sets - 10 hold   Supine ITB Stretch with Strap - 1 x daily - 7 x weekly - 10 reps - 1 sets - 10 hold   Supine Quadricep Sets - 1 x daily - 7 x weekly - 10 reps - 1 sets - 10 hold   Small Range Straight Leg Raise - 1 x daily - 7 x weekly - 10 reps - 3 sets   Supine Bridge with Mini Swiss Ball Between Knees - 1 x daily - 7 x weekly - 1 sets - 10 reps - 10 hold   Clamshell with Resistance - 1 x daily - 7 x weekly - 10 reps - 3 sets   Prone Hip Extension - 1 x daily - 7 x weekly - 10 reps - 3 sets   Standing Heel Raise - 1 x daily - 7 x weekly - 10 reps - 3 sets   Single Leg Stance - 1 x daily - 7 x weekly - 1 reps - 3 sets - 30 hold   Wall Squat - 1 x daily - 7 x weekly - 1 sets - 3 reps - 30 hold   Small Range Straight Leg Raise - 1 x daily - 7 x weekly - 1 sets - 3-10 reps - 30 hold        [] (78991)Reviewed/Progressed HEP activities related to improving balance, coordination, kinesthetic sense, posture, motor skill, proprioception of core, proximal hip and LE for self functioning as indicated by patients Functional Deficits. []? Progressing: []? Met: []? Not Met: []? Adjusted   3. Patient will demonstrate an increase in Strength to good proximal hip strength and control, within 5lb HHD in LE to allow for proper functional mobility as indicated by patients Functional Deficits. []? Progressing: []? Met: []? Not Met: []? Adjusted   4. Patient will return to 100% functional activities without increased symptoms or restriction. []? Progressing: []? Met: []? Not Met: []? Adjusted   5. Pt will be able to return to playing volleyball and sand volleyball. (patient specific functional goal)    []? Progressing: []? Met: []? Not Met: []? Adjusted     Overall Progression Towards Functional goals/ Treatment Progress Update:  [] Patient is progressing as expected towards functional goals listed. [] Progression is slowed due to complexities/Impairments listed. [] Progression has been slowed due to co-morbidities. [x] Plan just implemented, too soon to assess goals progression <30days   [] Goals require adjustment due to lack of progress  [] Patient is not progressing as expected and requires additional follow up with physician  [] Other    ASSESSMENT:  Pt tolerated program well w/ mod fatigue from advanced program. Pt did not have clicking or poor patella control w/ active motion. Pt has good control of exercises w/ correct tech w/o cuing. Treatment/Activity Tolerance:  [x] Patient tolerated treatment well [x] Patient limited by fatique  [] Patient limited by pain  [] Patient limited by other medical complications  [] Other:     Prognosis: [x] Good [] Fair  [] Poor    Patient Requires Follow-up: [x] Yes  [] No    PLAN: Cont therapy to advance strength in R LE for return to full function.    [x] Continue per plan of care [] Alter current plan (see comments)  [x] Plan of care initiated [] Hold pending MD visit [] Discharge    Electronically signed by: Andreea Lim PT, MS, OMT-C    Physical Therapist [place] license #[id number]  Physical Therapist [place] license #[id number]    Note: If patient does not return for scheduled/ recommended follow up visits, this note will serve as a discharge from care along with most recent update on progress.

## 2021-04-21 ENCOUNTER — TREATMENT (OUTPATIENT)
Dept: PHYSICAL THERAPY | Age: 31
End: 2021-04-21
Payer: COMMERCIAL

## 2021-04-21 DIAGNOSIS — R26.2 DIFFICULTY WALKING: ICD-10-CM

## 2021-04-21 DIAGNOSIS — M22.2X1 PATELLOFEMORAL PAIN SYNDROME OF RIGHT KNEE: ICD-10-CM

## 2021-04-21 DIAGNOSIS — M25.561 ACUTE PAIN OF RIGHT KNEE: Primary | ICD-10-CM

## 2021-04-21 PROCEDURE — 97110 THERAPEUTIC EXERCISES: CPT | Performed by: PHYSICAL THERAPIST

## 2021-04-21 PROCEDURE — 97530 THERAPEUTIC ACTIVITIES: CPT | Performed by: PHYSICAL THERAPIST

## 2021-04-21 PROCEDURE — 97112 NEUROMUSCULAR REEDUCATION: CPT | Performed by: PHYSICAL THERAPIST

## 2021-04-21 NOTE — PROGRESS NOTES
[facility]   Phone: [phone]    Fax: [phone]      Physical Therapy Treatment Note/ Progress Report:    Physical Therapy Re-Certification Plan of Care    Dear  Dr. Abiola Hamilton,    We had the pleasure of treating the following patient for physical therapy services at [address] [place] [address]. A summary of our findings can be found in the updated assessment below. This includes our plan of care. If you have any questions or concerns regarding these findings, please do not hesitate to contact me at the office phone number checked above. Thank you for the referral.     Physician Signature:________________________________Date:__________________  By signing above (or electronic signature), therapists plan is approved by physician      Overall Response to Treatment:   [x]Patient is responding well to treatment and improvement is noted with regards  to goals   []Patient should continue to improve in reasonable time if they continue HEP   []Patient has plateaued and is no longer responding to skilled PT intervention    []Patient is getting worse and would benefit from return to referring MD   []Patient unable to adhere to initial POC   []Other: Pt is making good progress toward goals of improved function and resolution of symptoms in R knee. Pt is making good progress in strength and function w/ focus on HEP. Recommend that pt continue therapy to focus on HEP to meet goals.          Date:  2021    Patient Name:  Jin Velez    :  1990  MRN: <[id number]>  Restrictions/Precautions:    Medical/Treatment Diagnosis Information:  Diagnosis: R patella tendonitis     Insurance/Certification information:  PT Insurance Information: [address] [place] [address],[address]  Physician Information:     Has the plan of care been signed (Y/N):        []  Yes  [x]  No 3/12/21    Date of Patient follow up with Physician:  3/15/21    Is this a Progress Report:     []  Yes  [x]  No        If Yes:  Date Range for reporting period:  Beginning 3/12/21- update NPV  Ending 4/12/21    Progress report will be due (10 Rx or 30 days whichever is less): 6/71/99     Recertification will be due (POC Duration  / 90 days whichever is less): 4/12/21       Visit # Insurance Allowable Auth Required   4 30 []  Yes []  No        Functional Scale: LEFI 43/80=53.5%   Date assessed:  3/12/2021  4/21/21 LEFI 55/80=68%      Latex Allergy:  [x]NO      []YES  Preferred Language for Healthcare:   [x]English       []other:    Pain level:  3/10     SUBJECTIVE:  Pt has less \"locking\" in R knee but still noted. Her lower leg feels different at times and top of R foot feels funny when waking up in AM. HEP is going well w/ ease in program.    OBJECTIVE: See eval   Observation: knee stabilizing brace donned; min to no edema noted; TTP along lat, inferior patella region; tightness noted in calf muscles on R;    Test measurements:       Flexibility L R Comment   Hamstring WNL WNL 4/21/2021   Gastroc WNL  WNL     ITB WNL WNL     Quad                                   ROM PROM AROM Overpressure Comment     L R L R L R 4/21/2021   Flexion     145 145         Extension     0 0                                                   Strength L R Comment   Quad 46.6 lb 40.6 lb 4/21/2021   Hamstring 35.4 lb 31.6 lb     Gastroc                                     RESTRICTIONS/PRECAUTIONS: COVID 19; multiple fractures -17 over the years;     Exercises/Interventions:   Exercise/Equipment Resistance/Repetitions Other comments   Cardio/Warm-up     Bike     Treadmill          Stretching     Hamstring Seated 10\"x10    Hip Flexion     ITB W/ towel 10\"x10    Grion     Quad     Inclined Calf 10\"x10    Towel Pull          ROM     Passive     Active     Weight Shift     Weight Hangs     Sheet Pulls     Ankle Pumps           Patellar Glides     Medial     Superior     Inferior          STRENGTH     SLR     Supine 2# 3x10 VMO focus   Prone 1# 3x10    Abduction- clams     Adducton/ PS + bridge  Arms across chest   Single bridge 2x10    SLR+          Isometrics     Quad sets          CKC     Calf raises x30 double over incline; x10 single    Wall sits 45\"x3    Step ups     1 leg stand     Squatting     CC TKE     Monster walks GTB 3 laps         Balance SLS 30\"x2 ec, airex Cuing on foot position    SLS reaching 4 cups x5    PRE     Extension 1# x30 sitting at side of table end range RANGE:   Flexion  RANGE:   SLR+  30\"+5 pumps for 5 reps sitting        Leg Press  RANGE:        Cable Column     Ed given on HEP, POC & expectations X10'; 4/5/21 reviewed and updated    Review of brace fitting x5'    Manual/Modalities                 Therapeutic Exercise and NMR EXR  [x] (95016) Provided verbal/tactile cueing for activities related to strengthening, flexibility, endurance, ROM for improvements in LE, proximal hip, and core control with self care, mobility, lifting, ambulation.  [] (20665) Provided verbal/tactile cueing for activities related to improving balance, coordination, kinesthetic sense, posture, motor skill, proprioception  to assist with LE, proximal hip, and core control in self care, mobility, lifting, ambulation and eccentric single leg control.      NMR and Therapeutic Activities:    [x] (97354 or 49106) Provided verbal/tactile cueing for activities related to improving balance, coordination, kinesthetic sense, posture, motor skill, proprioception and motor activation to allow for proper function of core, proximal hip and LE with self care and ADLs  [] (30924) Gait Re-education- Provided training and instruction to the patient for proper LE, core and proximal hip recruitment and positioning and eccentric body weight control with ambulation re-education including up and down stairs     Home Exercise Program:    [x] (66545) Reviewed/Progressed HEP activities related to strengthening, flexibility, endurance, ROM of core, proximal hip and LE for functional self-care, mobility, lifting and ambulation/stair navigation            Written HEP est    Access Code: [email or web address]: [email or web address]: 04/21/2021Prepared by: Tannerse Fus   Gastroc Stretch on Wall - 1 x daily - 7 x weekly - 10 reps - 1 sets - 10 hold   Seated Table Hamstring Stretch - 1 x daily - 7 x weekly - 10 reps - 1 sets - 10 hold   Supine ITB Stretch with Strap - 1 x daily - 7 x weekly - 10 reps - 1 sets - 10 hold   Small Range Straight Leg Raise - 1 x daily - 7 x weekly - 10 reps - 3 sets   Single Leg Bridge - 1 x daily - 7 x weekly - 3 sets - 10 reps   Clamshell with Resistance - 1 x daily - 7 x weekly - 10 reps - 3 sets   Standing Heel Raise - 1 x daily - 7 x weekly - 10 reps - 3 sets   Wall Squat - 1 x daily - 7 x weekly - 1 sets - 3 reps - 60 hold   Side Lunge Adductor Stretch - 1 x daily - 7 x weekly - 1 sets - 5 reps   Single Leg Balance with Alternating Floor Reaches - 1 x daily - 7 x weekly - 1 sets - 5 reps   Runner's Step Up/Down - 1 x daily - 7 x weekly - 1 sets - 10 reps   Small Range Straight Leg Raise - 1 x daily - 7 x weekly - 1 sets - 3-10 reps - 30 hold        [] (49739)Reviewed/Progressed HEP activities related to improving balance, coordination, kinesthetic sense, posture, motor skill, proprioception of core, proximal hip and LE for self care, mobility, lifting, and ambulation/stair navigation      Manual Treatments:  PROM / STM / Oscillations-Mobs:  G-I, II, III, IV (PA's, Inf., Post.)  [] (28445) Provided manual therapy to mobilize LE, proximal hip and/or LS spine soft tissue/joints for the purpose of modulating pain, promoting relaxation,  increasing ROM, reducing/eliminating soft tissue swelling/inflammation/restriction, improving soft tissue extensibility and allowing for proper ROM for normal function with self care, mobility, lifting and ambulation. Modalities:  CP x10 min   [] GAME READY (VASO)- for significant edema, swelling, pain control.      Charges:  Timed Code Treatment Minutes: 60   Total Treatment Minutes: 70     [] EVAL (LOW) 04299 (typically 20 minutes face-to-face)  [] EVAL (MOD) 01704 (typically 30 minutes face-to-face)  [] EVAL (HIGH) 83977 (typically 45 minutes face-to-face)  [] RE-EVAL   [x] LF(93348) 2 x 30    [] IONTO  [x] NMR (89507) 1 x 15    [] VASO  [] Manual (85041) x      [] Other:  [x] TA (82789) 1 x 15 min     [] Mech Traction (70734)  [] ES(attended) (12365)      [] ES (un) (97896):     GOALS:  Patient stated goal: Full ROM w/ activity and not have to wear brace  []? Progressing: []? Met: []? Not Met: []? Adjusted     Therapist goals for Patient:   Short Term Goals: To be achieved in: 2 weeks  1. Independent in HEP and progression per patient tolerance, in order to prevent re-injury. []? Progressing: []? Met: []? Not Met: []? Adjusted   2. Patient will have a decrease in pain to facilitate improvement in movement, function, and ADLs as indicated by Functional Deficits. []? Progressing: []? Met: []? Not Met: []? Adjusted      Long Term Goals: To be achieved in: 12 weeks  1. Disability index score of 10% or less for the LEFS to assist with reaching prior level of function. []? Progressing: []? Met: []? Not Met: []? Adjusted   2. Patient will demonstrate increased AROM to WNL's to allow for proper joint functioning as indicated by patients Functional Deficits. []? Progressing: [x]? Met: []? Not Met: []? Adjusted   3. Patient will demonstrate an increase in Strength to good proximal hip strength and control, within 5lb HHD in LE to allow for proper functional mobility as indicated by patients Functional Deficits. [x]? Progressing: []? Met: []? Not Met: []? Adjusted   4. Patient will return to 100% functional activities without increased symptoms or restriction. [x]? Progressing: []? Met: []? Not Met: []? Adjusted   5. Pt will be able to return to playing volleyball and sand volleyball. (patient specific functional goal)    [x]?  Progressing: []?

## 2021-04-28 ENCOUNTER — TREATMENT (OUTPATIENT)
Dept: PHYSICAL THERAPY | Age: 31
End: 2021-04-28
Payer: COMMERCIAL

## 2021-04-28 DIAGNOSIS — M25.561 ACUTE PAIN OF RIGHT KNEE: Primary | ICD-10-CM

## 2021-04-28 DIAGNOSIS — R26.2 DIFFICULTY WALKING: ICD-10-CM

## 2021-04-28 DIAGNOSIS — M22.2X1 PATELLOFEMORAL PAIN SYNDROME OF RIGHT KNEE: ICD-10-CM

## 2021-04-28 PROCEDURE — 97530 THERAPEUTIC ACTIVITIES: CPT | Performed by: PHYSICAL THERAPIST

## 2021-04-28 PROCEDURE — 97110 THERAPEUTIC EXERCISES: CPT | Performed by: PHYSICAL THERAPIST

## 2021-04-28 PROCEDURE — 97140 MANUAL THERAPY 1/> REGIONS: CPT | Performed by: PHYSICAL THERAPIST

## 2021-04-28 NOTE — PROGRESS NOTES
[facility]   Phone: [phone]    Fax: [phone]      Physical Therapy Treatment Note/ Progress Report:         Date:  2021    Patient Name:  Laura Heath    :  1990  MRN: <[id number]>  Restrictions/Precautions:    Medical/Treatment Diagnosis Information:  Diagnosis: R patella tendonitis     Insurance/Certification information:  PT Insurance Information: [address] [place] [address],[address]  Physician Information:     Has the plan of care been signed (Y/N):        []  Yes  [x]  No 3/12/21    Date of Patient follow up with Physician:  3/15/21    Is this a Progress Report:     []  Yes  [x]  No        If Yes:  Date Range for reporting period:  Beginning 21  Ending 21    Progress report will be due (10 Rx or 30 days whichever is less): 3/17/04     Recertification will be due (POC Duration  / 90 days whichever is less): 21       Visit # Insurance Allowable Auth Required   5 30 []  Yes []  No        Functional Scale: LEFI 43/80=53.5%   Date assessed:  3/12/2021  4/21/21 LEFI 55/80=68%      Latex Allergy:  [x]NO      []YES  Preferred Language for Healthcare:   [x]English       []other:    Pain level:  3/10     SUBJECTIVE:  Pt has less \"locking\" in R knee but still noted. Pt has been having pain in ant R foot at times. She has good shoes that she has been wearing supportative shoes regularly.      OBJECTIVE: See eval   Observation: knee stabilizing brace donned; min to no edema noted; TTP along lat, inferior patella region; tightness noted in calf muscles on R;    Test measurements:       Flexibility L R Comment   Hamstring WNL WNL 2021   Gastroc WNL  WNL     ITB WNL WNL     Quad                                   ROM PROM AROM Overpressure Comment     L R L R L R 2021   Flexion     145 145         Extension     0 0                                                   Strength L R Comment   Quad 46.6 lb 40.6 lb 2021   Hamstring 35.4 lb 31.6 lb     Gastroc                                     RESTRICTIONS/PRECAUTIONS: COVID 19; multiple fractures -17 over the years; Exercises/Interventions:   Exercise/Equipment Resistance/Repetitions Other comments   Cardio/Warm-up     Bike     Treadmill          Stretching     Hamstring Seated 10\"x10    Hip Flexion     ITB W/ towel 10\"x10    Grion     Quad     Inclined Calf 10\"x10    Towel Pull          ROM     Passive     Active     Weight Shift     Weight Hangs     Sheet Pulls     Ankle Pumps           Patellar Glides     Medial     Superior     Inferior          STRENGTH     SLR     Supine Prone Abduction- clams     Adducton/ PS + bridge  Arms across chest   Single bridge 2x10    SLR+          Isometrics     Quad sets          CKC     Calf raises x30 double over incline x3; x10 single    Wall sits 1' x3    Step downs L3 x30    1 leg stand     Squatting     CC TKE     Monster walks blackTB 3 laps  + 4#         Balance  Cuing on foot position    SLS reaching 4 cups x5 On floor   PRE    Extension RANGE:   Flexion  RANGE:   steamboat Black TB x30 4 directions on ea side    SLR+   sitting        Leg Press  RANGE:        Cable Column     Ed given on HEP, POC & expectations X10'; 4/5/21 reviewed and updated    Review of brace fitting x5'    Manual/Modalities                 Therapeutic Exercise and NMR EXR  [x] (50924) Provided verbal/tactile cueing for activities related to strengthening, flexibility, endurance, ROM for improvements in LE, proximal hip, and core control with self care, mobility, lifting, ambulation.  [] (25268) Provided verbal/tactile cueing for activities related to improving balance, coordination, kinesthetic sense, posture, motor skill, proprioception  to assist with LE, proximal hip, and core control in self care, mobility, lifting, ambulation and eccentric single leg control.      NMR and Therapeutic Activities:    [x] (00002 or 14118) Provided verbal/tactile cueing for activities related to improving balance, coordination, kinesthetic sense, posture, motor skill, proprioception and motor activation to allow for proper function of core, proximal hip and LE with self care and ADLs  [] (11946) Gait Re-education- Provided training and instruction to the patient for proper LE, core and proximal hip recruitment and positioning and eccentric body weight control with ambulation re-education including up and down stairs     Home Exercise Program:    [x] (19736) Reviewed/Progressed HEP activities related to strengthening, flexibility, endurance, ROM of core, proximal hip and LE for functional self-care, mobility, lifting and ambulation/stair navigation            Written HEP     Access Code: [email or web address]: [email or web address]/Date: 04/28/2021Prepared by: Jimmie Russo   Gastroc Stretch on Wall - 1 x daily - 7 x weekly - 10 reps - 1 sets - 10 hold   Seated Table Hamstring Stretch - 1 x daily - 7 x weekly - 10 reps - 1 sets - 10 hold   Supine ITB Stretch with Strap - 1 x daily - 7 x weekly - 10 reps - 1 sets - 10 hold   Single Leg Bridge - 1 x daily - 7 x weekly - 3 sets - 10 reps   [place] Hip Flexion with Resistance - 1 x daily - 7 x weekly - 1 sets - 30 reps   Standing Heel Raise - 1 x daily - 7 x weekly - 10 reps - 3 sets   Wall Squat - 1 x daily - 7 x weekly - 1 sets - 3 reps - 60 hold   Side Lunge Adductor Stretch - 1 x daily - 7 x weekly - 1 sets - 5 reps   Single Leg Balance with Alternating Floor Reaches - 1 x daily - 7 x weekly - 5 sets - 4 reps   Runner's Step Up/Down - 1 x daily - 7 x weekly - 1 sets - 10 reps   Small Range Straight Leg Raise - 1 x daily - 7 x weekly - 1 sets - 3-10 reps - 30 hold      [] (06179)Reviewed/Progressed HEP activities related to improving balance, coordination, kinesthetic sense, posture, motor skill, proprioception of core, proximal hip and LE for self care, mobility, lifting, and ambulation/stair navigation      Manual Treatments:  PROM / STM / Oscillations-Mobs:  G-I, II, III, IV (PA's, Inf., Post.)  [] (18236) Provided manual therapy to mobilize LE, proximal hip and/or LS spine soft tissue/joints for the purpose of modulating pain, promoting relaxation,  increasing ROM, reducing/eliminating soft tissue swelling/inflammation/restriction, improving soft tissue extensibility and allowing for proper ROM for normal function with self care, mobility, lifting and ambulation. Modalities:  CP x10 min   [] GAME READY (VASO)- for significant edema, swelling, pain control. Charges:  Timed Code Treatment Minutes: 60   Total Treatment Minutes: 70     [] EVAL (LOW) 44274 (typically 20 minutes face-to-face)  [] EVAL (MOD) 30611 (typically 30 minutes face-to-face)  [] EVAL (HIGH) 09118 (typically 45 minutes face-to-face)  [] RE-EVAL   [x] BR(57354) 2 x 30    [] IONTO  [x] NMR (12903) 1 x 15    [] VASO  [] Manual (50159) x      [] Other:  [x] TA (33893) 1 x 15 min     [] Mech Traction (11414)  [] ES(attended) (35624)      [] ES (un) (50423):     GOALS:  Patient stated goal: Full ROM w/ activity and not have to wear brace  []? Progressing: []? Met: []? Not Met: []? Adjusted     Therapist goals for Patient:   Short Term Goals: To be achieved in: 2 weeks  1. Independent in HEP and progression per patient tolerance, in order to prevent re-injury. []? Progressing: []? Met: []? Not Met: []? Adjusted   2. Patient will have a decrease in pain to facilitate improvement in movement, function, and ADLs as indicated by Functional Deficits. []? Progressing: []? Met: []? Not Met: []? Adjusted      Long Term Goals: To be achieved in: 12 weeks  1. Disability index score of 10% or less for the LEFS to assist with reaching prior level of function. []? Progressing: []? Met: []? Not Met: []? Adjusted   2. Patient will demonstrate increased AROM to WNL's to allow for proper joint functioning as indicated by patients Functional Deficits. []? Progressing: [x]? Met: []? Not Met: []? Adjusted   3.  Patient will demonstrate an increase in Strength to good proximal hip strength and control, within 5lb HHD in LE to allow for proper functional mobility as indicated by patients Functional Deficits. [x]? Progressing: []? Met: []? Not Met: []? Adjusted   4. Patient will return to 100% functional activities without increased symptoms or restriction. [x]? Progressing: []? Met: []? Not Met: []? Adjusted   5. Pt will be able to return to playing volleyball and sand volleyball. (patient specific functional goal)    [x]? Progressing: []? Met: []? Not Met: []? Adjusted     Overall Progression Towards Functional goals/ Treatment Progress Update:  [] Patient is progressing as expected towards functional goals listed. [] Progression is slowed due to complexities/Impairments listed. [] Progression has been slowed due to co-morbidities. [x] Plan just implemented, too soon to assess goals progression <30days   [] Goals require adjustment due to lack of progress  [] Patient is not progressing as expected and requires additional follow up with physician  [] Other    ASSESSMENT: Pt is making good progress in LE strength w/ compliance in HEP. Pt has less symptoms and none today w/o brace donned for program. Mod fatigue noted from exercises. Balance improving. Good progress toward full function. Treatment/Activity Tolerance:  [x] Patient tolerated treatment well [x] Patient limited by fatique  [] Patient limited by pain  [] Patient limited by other medical complications  [] Other:     Prognosis: [x] Good [] Fair  [] Poor    Patient Requires Follow-up: [x] Yes  [] No    PLAN: Cont therapy to advance strength in R LE for return to full function. Cont therapy 1x/wk for 4 wks using TE, TA, NM, MT and mod PRN.   [x] Continue per plan of care [] Alter current plan (see comments)  [x] Plan of care initiated [] Hold pending MD visit [] Discharge    Electronically signed by: Neil Taylor PT, MS, OMT-C    Physical Therapist [facility] #[id number]  Physical Therapist [place] license #[id number]    Note: If patient does not return for scheduled/ recommended follow up visits, this note will serve as a discharge from care along with most recent update on progress.

## 2021-05-26 ENCOUNTER — HOSPITAL ENCOUNTER (OUTPATIENT)
Dept: MRI IMAGING | Age: 31
Discharge: HOME | End: 2021-05-26
Payer: COMMERCIAL

## 2021-05-26 ENCOUNTER — APPOINTMENT (OUTPATIENT)
Dept: GENERAL RADIOLOGY | Age: 31
End: 2021-05-26
Payer: COMMERCIAL

## 2021-05-26 DIAGNOSIS — G89.29 CHRONIC PAIN OF RIGHT KNEE: ICD-10-CM

## 2021-05-26 DIAGNOSIS — M25.561 CHRONIC PAIN OF RIGHT KNEE: ICD-10-CM

## 2021-05-26 PROCEDURE — 73721 MRI JNT OF LWR EXTRE W/O DYE: CPT

## 2021-05-28 ENCOUNTER — APPOINTMENT (OUTPATIENT)
Dept: GENERAL RADIOLOGY | Age: 31
End: 2021-05-28
Payer: COMMERCIAL

## 2021-07-28 ENCOUNTER — TELEPHONE (OUTPATIENT)
Dept: ORTHOPEDIC SURGERY | Age: 31
End: 2021-07-28

## 2021-07-28 NOTE — TELEPHONE ENCOUNTER
Called and left message for patient that she needs to bring her medical records and xray films to her appointment tomorrow. If she does not have them to bring, please call to reschedule appointment for when she has records and X-rays.

## 2021-07-29 ENCOUNTER — OFFICE VISIT (OUTPATIENT)
Dept: ORTHOPEDIC SURGERY | Age: 31
End: 2021-07-29
Payer: COMMERCIAL

## 2021-07-29 VITALS
HEIGHT: 68 IN | SYSTOLIC BLOOD PRESSURE: 130 MMHG | BODY MASS INDEX: 17.43 KG/M2 | DIASTOLIC BLOOD PRESSURE: 93 MMHG | HEART RATE: 76 BPM | WEIGHT: 115 LBS

## 2021-07-29 DIAGNOSIS — M25.561 ACUTE PAIN OF RIGHT KNEE: ICD-10-CM

## 2021-07-29 DIAGNOSIS — M65.9 SYNOVITIS OF RIGHT KNEE: Primary | ICD-10-CM

## 2021-07-29 PROCEDURE — 99204 OFFICE O/P NEW MOD 45 MIN: CPT | Performed by: ORTHOPAEDIC SURGERY

## 2021-07-29 RX ORDER — DESONIDE 0.5 MG/G
OINTMENT TOPICAL
COMMUNITY
Start: 2021-06-27 | End: 2022-09-22

## 2021-07-29 RX ORDER — FLUTICASONE PROPIONATE 50 MCG
SPRAY, SUSPENSION (ML) NASAL DAILY
COMMUNITY
End: 2022-09-22

## 2021-07-29 RX ORDER — CLOTRIMAZOLE 1 %
CREAM (GRAM) TOPICAL
COMMUNITY
Start: 2021-06-14 | End: 2022-09-22

## 2021-07-29 ASSESSMENT — ENCOUNTER SYMPTOMS
RESPIRATORY NEGATIVE: 1
ALLERGIC/IMMUNOLOGIC NEGATIVE: 1
EYES NEGATIVE: 1
GASTROINTESTINAL NEGATIVE: 1

## 2021-07-29 NOTE — PROGRESS NOTES
Subjective:      Patient ID: Gerson Salvador is a 26 y.o. female. HPI  Gerson Salvador is seen today for evaluation of her right knee. She is a . In March of this year her right knee locked up over the anterior lateral aspect. It brought her to her knees with pain. She had physical therapy from March through April at [facility] since he. She had an injection. She had a brace. None of that has been helpful. She has had an MRI which had some concern about medial meniscal pathology. She was actually scheduled for a lateral synovial plica excision at [facility] but it was canceled because of insurance constraints. Currently pain is 4 out of 10. She feels very debilitated by this because she is so active but cannot be active. She is otherwise healthy. Review of Systems   Constitutional: Negative. HENT: Negative. Eyes: Negative. Respiratory: Negative. Cardiovascular: Negative. Gastrointestinal: Negative. Endocrine: Negative. Genitourinary: Negative. Musculoskeletal: Negative. Allergic/Immunologic: Negative. Neurological: Negative. Hematological: Negative. Psychiatric/Behavioral: Negative. Objective:   Physical Exam  History: Patient's relevant past family, medical, and social history are reviewed as part of today's visit. ROS of pertinent positives and negatives as above; otherwise negative. General Exam:    Vitals: Blood pressure (!) 130/93, pulse 76, height 5' 8\" (1.727 m), weight 115 lb (52.2 kg), last menstrual period 07/21/2021. Constitutional: Patient is adequately groomed with no evidence of malnutrition  Mental Status: The patient is oriented to time, place and person. The patient's mood and affect are appropriate. Gait:  Patient walks with normal gait and station. Lymphatic: The lymphatic examination bilaterally reveals all areas to be without enlargement or induration.   Vascular: Examination reveals no swelling or calf limited to persistent pain, instability, and reinjury. Risks also include risk of infection which could result in the need for further surgery and long-term use of antibiotics. Risks also include deep venous thromboses and pulmonary emboli. Risks also include problems with anesthesia including but not limited to cardiovascular compromise , stroke,  and death. The patient understands that the goal of surgery is to improve pain and function but that can never be guaranteed. At this point, she would like to proceed with surgery. She understands a greatest risk is that of persistent pain. We discussed full recovery be in the order of 6 weeks. Were going to proceed with right knee arthroscopy with evaluation of the intra-articular space and excision of her fat pad and possible lateral synovial plica excision. She has not been vaccinated for Covid so she will require preoperative test.  Should get preoperative medical clearance and I will see her in the operating room. All questions have been answered. Medical decision making today was moderate. This note was created using voice recognition software. It has been proofread, but occasionally errors remain. Please disregard these errors. They will be corrected as they are noted.

## 2021-07-30 ENCOUNTER — TELEPHONE (OUTPATIENT)
Dept: ORTHOPEDIC SURGERY | Age: 31
End: 2021-07-30

## 2021-07-30 NOTE — TELEPHONE ENCOUNTER
CPT: 11396  BODY PART: right knee  AUTHORIZATION: NPR    Per [facility] website, NPR

## 2021-08-13 NOTE — PROGRESS NOTES
[address] [facility] time_0820___________        Surgery time__1020__________    Take the following medications with a sip of water: Follow your MD/Surgeons pre-procedure instructions regarding your medications    Do not eat or drink anything after 12:00 midnight prior to your surgery. This includes water chewing gum, mints and ice chips. You may brush your teeth and gargle the morning of your surgery, but do not swallow the water     Please see your family doctor/pediatrician for a history and physical and/or concerning medications. Bring any test results/reports from your physicians office. If you are under the care of a heart doctor or specialist doctor, please be aware that you may be asked to them for clearance    You may be asked to stop blood thinners such as Coumadin, Plavix, Fragmin, Lovenox, etc., or any anti-inflammatories such as:  Aspirin, Ibuprofen, Advil, Naproxen prior to your surgery. We also ask that you stop any OTC medications such as fish oil, vitamin E, glucosamine, garlic, Multivitamins, COQ 10, etc.    We ask that you do not smoke 24 hours prior to surgery  We ask that you do not  drink any alcoholic beverages 24 hours prior to surgery     You must make arrangements for a responsible adult to take you home after your surgery. For your safety you will not be allowed to leave alone or drive yourself home. Your surgery will be cancelled if you do not have a ride home. SAFETY FIRST. .call before you fall  Also for your safety, it is strongly suggested that someone stay with you the first 24 hours after your surgery. A parent or legal guardian must accompany a child scheduled for surgery and plan to stay at the hospital until the child is discharged. Please do not bring other children with you. For your comfort, please wear simple loose fitting clothing to the hospital.  Please do not bring valuables.     Do not wear any make-up or nail polish on your fingers or toes      For your safety, please do not wear any jewelry or body piercing's on the day of surgery. All jewelry must be removed. If you have dentures, they will be removed before going to operating room. For your convenience, we will provide you with a container. If you wear contact lenses or glasses, they will be removed, please bring a case for them. If you have a living will and a durable power of  for healthcare, please bring in a copy. As part of our patient safety program to minimize surgical site infections, we ask you to do the following:    · Please notify your surgeon if you develop any illness between         now and the  day of your surgery. · This includes a cough, cold, fever, sore throat, nausea,         or vomiting, and diarrhea, etc.  ·  Please notify your surgeon if you experience dizziness, shortness         of breath or blurred vision between now and the time of your surgery. Do not shave your operative site 96 hours prior to surgery. For face and neck surgery, men may use an electric razor 48 hours   prior to surgery. You may shower the night before surgery or the morning of   your surgery with an antibacterial soap. You will need to bring a photo ID and insurance card    [facility] has an onsite pharmacy, would you like to utilize our pharmacy     If you will be staying overnight and use a C-pap machine, please bring   your C-pap to hospital     Our goal is to provide you with excellent care, therefore, visitors will be limited to two(2) in the room at a time so that we may focus on providing this care for you. Please contact pre-admission testing if you have any further questions.                  [facility] phone number:  [phone] PAT fax number:  [phone]  Please note these are generalized instructions for all surgical cases, you may be provided with more specific instructions according to your surgery.

## 2021-08-13 NOTE — PROGRESS NOTES
Phone message left to call [facility] dept at [phone]  for history review and surgery instructions on 8/13/21 @ [phone]

## 2021-08-16 ENCOUNTER — TELEPHONE (OUTPATIENT)
Dept: ORTHOPEDIC SURGERY | Age: 31
End: 2021-08-16

## 2021-08-16 NOTE — TELEPHONE ENCOUNTER
Called and let patient know that [facility] is requesting patients get their Covid test prior to surgery due to a shortage of rapid tests. Explained that she will need to bring proof of a negative test to the hospital the morning of surgery. Patient verbalized understanding.

## 2021-08-17 ENCOUNTER — ANESTHESIA EVENT (OUTPATIENT)
Dept: OPERATING ROOM | Age: 31
End: 2021-08-17
Payer: COMMERCIAL

## 2021-08-17 DIAGNOSIS — M25.561 ACUTE PAIN OF RIGHT KNEE: ICD-10-CM

## 2021-08-17 DIAGNOSIS — M65.9 SYNOVITIS OF RIGHT KNEE: Primary | ICD-10-CM

## 2021-08-17 RX ORDER — DOCUSATE SODIUM 100 MG/1
100 CAPSULE, LIQUID FILLED ORAL 2 TIMES DAILY
Qty: 60 CAPSULE | Refills: 0 | Status: SHIPPED | OUTPATIENT
Start: 2021-08-17 | End: 2022-09-22

## 2021-08-17 RX ORDER — HYDROCODONE BITARTRATE AND ACETAMINOPHEN 5; 325 MG/1; MG/1
1 TABLET ORAL EVERY 4 HOURS PRN
Qty: 20 TABLET | Refills: 0 | Status: SHIPPED | OUTPATIENT
Start: 2021-08-17 | End: 2021-08-22

## 2021-08-17 RX ORDER — PROMETHAZINE HYDROCHLORIDE 25 MG/1
25 TABLET ORAL EVERY 6 HOURS PRN
Qty: 30 TABLET | Refills: 0 | Status: SHIPPED | OUTPATIENT
Start: 2021-08-17 | End: 2021-08-24

## 2021-08-18 ENCOUNTER — HOSPITAL ENCOUNTER (OUTPATIENT)
Age: 31
Setting detail: OUTPATIENT SURGERY
Discharge: HOME | End: 2021-08-18
Attending: ORTHOPAEDIC SURGERY | Admitting: ORTHOPAEDIC SURGERY
Payer: COMMERCIAL

## 2021-08-18 ENCOUNTER — ANESTHESIA (OUTPATIENT)
Dept: OPERATING ROOM | Age: 31
End: 2021-08-18
Payer: COMMERCIAL

## 2021-08-18 VITALS
BODY MASS INDEX: 17.76 KG/M2 | HEIGHT: 68 IN | RESPIRATION RATE: 16 BRPM | OXYGEN SATURATION: 99 % | SYSTOLIC BLOOD PRESSURE: 111 MMHG | HEART RATE: 92 BPM | WEIGHT: 117.17 LBS | DIASTOLIC BLOOD PRESSURE: 68 MMHG | TEMPERATURE: 97.5 F

## 2021-08-18 VITALS
DIASTOLIC BLOOD PRESSURE: 53 MMHG | TEMPERATURE: 96.3 F | SYSTOLIC BLOOD PRESSURE: 89 MMHG | OXYGEN SATURATION: 100 % | RESPIRATION RATE: 12 BRPM

## 2021-08-18 LAB — PREGNANCY, URINE: NEGATIVE

## 2021-08-18 PROCEDURE — 6360000002 HC RX W HCPCS: Performed by: ANESTHESIOLOGY

## 2021-08-18 PROCEDURE — 2500000003 HC RX 250 WO HCPCS

## 2021-08-18 PROCEDURE — 7100000000 HC PACU RECOVERY - FIRST 15 MIN: Performed by: ORTHOPAEDIC SURGERY

## 2021-08-18 PROCEDURE — 3700000001 HC ADD 15 MINUTES (ANESTHESIA): Performed by: ORTHOPAEDIC SURGERY

## 2021-08-18 PROCEDURE — 6360000002 HC RX W HCPCS: Performed by: ORTHOPAEDIC SURGERY

## 2021-08-18 PROCEDURE — 6360000002 HC RX W HCPCS

## 2021-08-18 PROCEDURE — 2720000010 HC SURG SUPPLY STERILE: Performed by: ORTHOPAEDIC SURGERY

## 2021-08-18 PROCEDURE — 2580000003 HC RX 258: Performed by: ORTHOPAEDIC SURGERY

## 2021-08-18 PROCEDURE — 3700000000 HC ANESTHESIA ATTENDED CARE: Performed by: ORTHOPAEDIC SURGERY

## 2021-08-18 PROCEDURE — 2709999900 HC NON-CHARGEABLE SUPPLY: Performed by: ORTHOPAEDIC SURGERY

## 2021-08-18 PROCEDURE — 2580000003 HC RX 258: Performed by: ANESTHESIOLOGY

## 2021-08-18 PROCEDURE — 84703 CHORIONIC GONADOTROPIN ASSAY: CPT

## 2021-08-18 PROCEDURE — 7100000011 HC PHASE II RECOVERY - ADDTL 15 MIN: Performed by: ORTHOPAEDIC SURGERY

## 2021-08-18 PROCEDURE — 2500000003 HC RX 250 WO HCPCS: Performed by: ORTHOPAEDIC SURGERY

## 2021-08-18 PROCEDURE — 7100000010 HC PHASE II RECOVERY - FIRST 15 MIN: Performed by: ORTHOPAEDIC SURGERY

## 2021-08-18 PROCEDURE — 7100000001 HC PACU RECOVERY - ADDTL 15 MIN: Performed by: ORTHOPAEDIC SURGERY

## 2021-08-18 PROCEDURE — 3600000004 HC SURGERY LEVEL 4 BASE: Performed by: ORTHOPAEDIC SURGERY

## 2021-08-18 PROCEDURE — 3600000014 HC SURGERY LEVEL 4 ADDTL 15MIN: Performed by: ORTHOPAEDIC SURGERY

## 2021-08-18 RX ORDER — LIDOCAINE HYDROCHLORIDE 20 MG/ML
INJECTION, SOLUTION EPIDURAL; INFILTRATION; INTRACAUDAL; PERINEURAL PRN
Status: DISCONTINUED | OUTPATIENT
Start: 2021-08-18 | End: 2021-08-18 | Stop reason: SDUPTHER

## 2021-08-18 RX ORDER — PROPOFOL 10 MG/ML
INJECTION, EMULSION INTRAVENOUS PRN
Status: DISCONTINUED | OUTPATIENT
Start: 2021-08-18 | End: 2021-08-18 | Stop reason: SDUPTHER

## 2021-08-18 RX ORDER — SODIUM CHLORIDE 9 MG/ML
INJECTION, SOLUTION INTRAVENOUS CONTINUOUS
Status: DISCONTINUED | OUTPATIENT
Start: 2021-08-18 | End: 2021-08-18 | Stop reason: HOSPADM

## 2021-08-18 RX ORDER — FENTANYL CITRATE 50 UG/ML
INJECTION, SOLUTION INTRAMUSCULAR; INTRAVENOUS PRN
Status: DISCONTINUED | OUTPATIENT
Start: 2021-08-18 | End: 2021-08-18 | Stop reason: SDUPTHER

## 2021-08-18 RX ORDER — PROMETHAZINE HYDROCHLORIDE 25 MG/ML
6.25 INJECTION, SOLUTION INTRAMUSCULAR; INTRAVENOUS
Status: DISCONTINUED | OUTPATIENT
Start: 2021-08-18 | End: 2021-08-18 | Stop reason: HOSPADM

## 2021-08-18 RX ORDER — ONDANSETRON 2 MG/ML
INJECTION INTRAMUSCULAR; INTRAVENOUS PRN
Status: DISCONTINUED | OUTPATIENT
Start: 2021-08-18 | End: 2021-08-18 | Stop reason: SDUPTHER

## 2021-08-18 RX ORDER — DEXAMETHASONE SODIUM PHOSPHATE 4 MG/ML
INJECTION, SOLUTION INTRA-ARTICULAR; INTRALESIONAL; INTRAMUSCULAR; INTRAVENOUS; SOFT TISSUE PRN
Status: DISCONTINUED | OUTPATIENT
Start: 2021-08-18 | End: 2021-08-18 | Stop reason: SDUPTHER

## 2021-08-18 RX ORDER — FENTANYL CITRATE 50 UG/ML
25 INJECTION, SOLUTION INTRAMUSCULAR; INTRAVENOUS EVERY 5 MIN PRN
Status: DISCONTINUED | OUTPATIENT
Start: 2021-08-18 | End: 2021-08-18 | Stop reason: HOSPADM

## 2021-08-18 RX ORDER — LABETALOL HYDROCHLORIDE 5 MG/ML
5 INJECTION, SOLUTION INTRAVENOUS EVERY 10 MIN PRN
Status: DISCONTINUED | OUTPATIENT
Start: 2021-08-18 | End: 2021-08-18 | Stop reason: HOSPADM

## 2021-08-18 RX ORDER — BUPIVACAINE HYDROCHLORIDE AND EPINEPHRINE 5; 5 MG/ML; UG/ML
INJECTION, SOLUTION EPIDURAL; INTRACAUDAL; PERINEURAL
Status: COMPLETED | OUTPATIENT
Start: 2021-08-18 | End: 2021-08-18

## 2021-08-18 RX ORDER — SODIUM CHLORIDE 0.9 % (FLUSH) 0.9 %
10 SYRINGE (ML) INJECTION EVERY 12 HOURS SCHEDULED
Status: DISCONTINUED | OUTPATIENT
Start: 2021-08-18 | End: 2021-08-18 | Stop reason: HOSPADM

## 2021-08-18 RX ORDER — SODIUM CHLORIDE, SODIUM LACTATE, POTASSIUM CHLORIDE, CALCIUM CHLORIDE 600; 310; 30; 20 MG/100ML; MG/100ML; MG/100ML; MG/100ML
INJECTION, SOLUTION INTRAVENOUS CONTINUOUS PRN
Status: COMPLETED | OUTPATIENT
Start: 2021-08-18 | End: 2021-08-18

## 2021-08-18 RX ORDER — SODIUM CHLORIDE 9 MG/ML
25 INJECTION, SOLUTION INTRAVENOUS PRN
Status: DISCONTINUED | OUTPATIENT
Start: 2021-08-18 | End: 2021-08-18 | Stop reason: HOSPADM

## 2021-08-18 RX ORDER — MIDAZOLAM HYDROCHLORIDE 1 MG/ML
INJECTION INTRAMUSCULAR; INTRAVENOUS PRN
Status: DISCONTINUED | OUTPATIENT
Start: 2021-08-18 | End: 2021-08-18 | Stop reason: SDUPTHER

## 2021-08-18 RX ORDER — SODIUM CHLORIDE 0.9 % (FLUSH) 0.9 %
10 SYRINGE (ML) INJECTION PRN
Status: DISCONTINUED | OUTPATIENT
Start: 2021-08-18 | End: 2021-08-18 | Stop reason: HOSPADM

## 2021-08-18 RX ADMIN — DEXAMETHASONE SODIUM PHOSPHATE 10 MG: 4 INJECTION, SOLUTION INTRAMUSCULAR; INTRAVENOUS at 10:36

## 2021-08-18 RX ADMIN — FENTANYL CITRATE 25 MCG: 50 INJECTION, SOLUTION INTRAMUSCULAR; INTRAVENOUS at 11:43

## 2021-08-18 RX ADMIN — FENTANYL CITRATE 25 MCG: 50 INJECTION INTRAMUSCULAR; INTRAVENOUS at 10:36

## 2021-08-18 RX ADMIN — LIDOCAINE HYDROCHLORIDE 80 MG: 20 INJECTION, SOLUTION EPIDURAL; INFILTRATION; INTRACAUDAL; PERINEURAL at 10:30

## 2021-08-18 RX ADMIN — SODIUM CHLORIDE: 9 INJECTION, SOLUTION INTRAVENOUS at 10:26

## 2021-08-18 RX ADMIN — FENTANYL CITRATE 25 MCG: 50 INJECTION INTRAMUSCULAR; INTRAVENOUS at 10:53

## 2021-08-18 RX ADMIN — MIDAZOLAM 2 MG: 1 INJECTION INTRAMUSCULAR; INTRAVENOUS at 10:26

## 2021-08-18 RX ADMIN — FENTANYL CITRATE 50 MCG: 50 INJECTION INTRAMUSCULAR; INTRAVENOUS at 10:33

## 2021-08-18 RX ADMIN — CEFAZOLIN SODIUM 2000 MG: 10 INJECTION, POWDER, FOR SOLUTION INTRAVENOUS at 10:33

## 2021-08-18 RX ADMIN — ONDANSETRON 4 MG: 2 INJECTION INTRAMUSCULAR; INTRAVENOUS at 10:38

## 2021-08-18 RX ADMIN — PROPOFOL 150 MG: 10 INJECTION, EMULSION INTRAVENOUS at 10:30

## 2021-08-18 ASSESSMENT — PULMONARY FUNCTION TESTS
PIF_VALUE: 9
PIF_VALUE: 11
PIF_VALUE: 3
PIF_VALUE: 2
PIF_VALUE: 9
PIF_VALUE: 2
PIF_VALUE: 1
PIF_VALUE: 9
PIF_VALUE: 11
PIF_VALUE: 11
PIF_VALUE: 0
PIF_VALUE: 11
PIF_VALUE: 1
PIF_VALUE: 2
PIF_VALUE: 2
PIF_VALUE: 0
PIF_VALUE: 10
PIF_VALUE: 11
PIF_VALUE: 0
PIF_VALUE: 3
PIF_VALUE: 0
PIF_VALUE: 9
PIF_VALUE: 2
PIF_VALUE: 3
PIF_VALUE: 9
PIF_VALUE: 24
PIF_VALUE: 9
PIF_VALUE: 4
PIF_VALUE: 9
PIF_VALUE: 8
PIF_VALUE: 2
PIF_VALUE: 1
PIF_VALUE: 9
PIF_VALUE: 8
PIF_VALUE: 9

## 2021-08-18 ASSESSMENT — PAIN SCALES - GENERAL
PAINLEVEL_OUTOF10: 4
PAINLEVEL_OUTOF10: 5
PAINLEVEL_OUTOF10: 5
PAINLEVEL_OUTOF10: 4
PAINLEVEL_OUTOF10: 2

## 2021-08-18 ASSESSMENT — PAIN DESCRIPTION - FREQUENCY
FREQUENCY: CONTINUOUS

## 2021-08-18 ASSESSMENT — PAIN DESCRIPTION - LOCATION
LOCATION: KNEE

## 2021-08-18 ASSESSMENT — PAIN DESCRIPTION - PROGRESSION
CLINICAL_PROGRESSION: GRADUALLY IMPROVING
CLINICAL_PROGRESSION: NOT CHANGED
CLINICAL_PROGRESSION: NOT CHANGED

## 2021-08-18 ASSESSMENT — PAIN DESCRIPTION - PAIN TYPE
TYPE: SURGICAL PAIN

## 2021-08-18 ASSESSMENT — PAIN DESCRIPTION - ONSET
ONSET: ON-GOING

## 2021-08-18 ASSESSMENT — PAIN DESCRIPTION - DESCRIPTORS
DESCRIPTORS: OTHER (COMMENT)
DESCRIPTORS: ACHING
DESCRIPTORS: ACHING

## 2021-08-18 ASSESSMENT — PAIN - FUNCTIONAL ASSESSMENT
PAIN_FUNCTIONAL_ASSESSMENT: PREVENTS OR INTERFERES SOME ACTIVE ACTIVITIES AND ADLS
PAIN_FUNCTIONAL_ASSESSMENT: 0-10

## 2021-08-18 ASSESSMENT — PAIN DESCRIPTION - ORIENTATION
ORIENTATION: RIGHT

## 2021-08-18 NOTE — BRIEF OP NOTE
Brief Postoperative Note      Patient: Allyssa Chirinos  YOB: 1990  MRN: [id number]    Date of Procedure: 8/18/2021    Pre-Op Diagnosis: RIGHT KNEE SYNOVITIS    Post-Op Diagnosis: Same       Procedure(s):  RIGHT KNEE ARTHROSCOPY EXCISION FAT PAD     Surgeon(s):  Nicole May MD    Assistant:  Surgical Assistant: Jhonathan Mantilla    Anesthesia: General    Estimated Blood Loss (mL): Minimal    Complications: None    Specimens:   * No specimens in log *    Implants:  * No implants in log *      Drains: * No LDAs found *    Findings: fat pad impingement, medial ganglion    Electronically signed by Renetta Butler MD on 8/18/2021 at 11:02 AM

## 2021-08-18 NOTE — OP NOTE
Findings as below;  1.  Normal patellofemoral joint. 2.  No medial plica. 3.  Medial femoral condyle and medial tibia plateau were normal.  4.  Medial meniscus had no evidence of tearing, but in the far posterior  horn aspect just above the root was a very tiny ganglion cyst measuring  3 to 4 mm in size. 5.  ACL and PCL were normal.  6.  Lateral side of the knee showed no lateral meniscus tear. 7.  There was abundant fat pad in that region; however, I resected that  using a combination of an ArthroCare device and suction shaver to be  sure there could be no evidence of impingement. No large lateral plica  was noted. We then proceeded back to the medial side of the knee where using a  basket forceps, I decompressed the ganglion and debrided it using a suction  shaver. No meniscus tear was noted. At this point, the knee was  evacuated of fluid. It was then injected with 4 mL of 0.5% Marcaine  with epinephrine. Portals were closed with nylon. Steri-Strips,  sterile dressing, and Ace bandage were applied, and the patient was  awoken and transported to Recovery without complication.         Shannon Hill MD    D: 08/18/2021 11:15:28       T: 08/18/2021 12:32:20     [id number]  Job#: [id number]     Doc#: [id number]    CC:

## 2021-08-18 NOTE — H&P
Denis Winters was seen and examined. No interval changes. All questions have been answered. Informed consent has been obtained. Patient is ready to proceed with right knee arthroscopy. Right knee has been marked.

## 2021-08-18 NOTE — PROGRESS NOTES
Discharge instructions given to patient, and to Dad per phone. Verbalize understanding. Scripts x 3 given. Resting quietly in bed without complaints.   Report to Gayathri Thompsonreid Carter

## 2021-08-18 NOTE — DISCHARGE INSTR - DIET

## 2021-08-18 NOTE — ANESTHESIA POSTPROCEDURE EVALUATION
Department of Anesthesiology  Postprocedure Note    Patient: Scott Klein  MRN: [id number]  YOB: 1990  Date of evaluation: 8/18/2021  Time:  1:25 PM     Procedure Summary     Date: 08/18/21 Room / Location: [address] [place] [address]    Anesthesia Start: 1026 Anesthesia Stop: 1109    Procedure: RIGHT KNEE ARTHROSCOPY EXCISION FAT PAD (Right Knee) Diagnosis:       Synovitis of right knee      (RIGHT KNEE SYNOVITIS)    Surgeons: Isabell Reagan MD Responsible Provider: Jackelin Olivera MD    Anesthesia Type: general ASA Status: 2          Anesthesia Type: general    Melvin Phase I: Melvin Score: 9    Melvin Phase II: Melvin Score: 10    Last vitals: Reviewed and per EMR flowsheets.        Anesthesia Post Evaluation    Patient location during evaluation: bedside  Patient participation: complete - patient participated  Level of consciousness: awake  Pain score: 3  Nausea & Vomiting: no nausea  Complications: no  Cardiovascular status: hemodynamically stable  Respiratory status: acceptable  Hydration status: stable

## 2021-08-20 ENCOUNTER — OFFICE VISIT (OUTPATIENT)
Dept: ORTHOPEDIC SURGERY | Age: 31
End: 2021-08-20

## 2021-08-20 ENCOUNTER — HOSPITAL ENCOUNTER (OUTPATIENT)
Dept: PHYSICAL THERAPY | Age: 31
Setting detail: THERAPIES SERIES
Discharge: HOME | End: 2021-08-20
Payer: COMMERCIAL

## 2021-08-20 VITALS — BODY MASS INDEX: 17.43 KG/M2 | WEIGHT: 115 LBS | RESPIRATION RATE: 12 BRPM | HEIGHT: 68 IN

## 2021-08-20 DIAGNOSIS — M67.461 GANGLION OF RIGHT KNEE: ICD-10-CM

## 2021-08-20 DIAGNOSIS — M25.561 ACUTE PAIN OF RIGHT KNEE: ICD-10-CM

## 2021-08-20 DIAGNOSIS — M65.9 SYNOVITIS OF RIGHT KNEE: Primary | ICD-10-CM

## 2021-08-20 PROCEDURE — 97161 PT EVAL LOW COMPLEX 20 MIN: CPT | Performed by: PHYSICAL THERAPIST

## 2021-08-20 PROCEDURE — 99024 POSTOP FOLLOW-UP VISIT: CPT | Performed by: ORTHOPAEDIC SURGERY

## 2021-08-20 PROCEDURE — 97530 THERAPEUTIC ACTIVITIES: CPT | Performed by: PHYSICAL THERAPIST

## 2021-08-20 PROCEDURE — 97016 VASOPNEUMATIC DEVICE THERAPY: CPT | Performed by: PHYSICAL THERAPIST

## 2021-08-20 PROCEDURE — 97110 THERAPEUTIC EXERCISES: CPT | Performed by: PHYSICAL THERAPIST

## 2021-08-20 NOTE — FLOWSHEET NOTE
[facility] a Department of [address], [address] [facility]  Office: [phone]  Fax:  [phone]                                                       Physical Therapy Treatment Note/ Progress Report:           Date:  2021    Patient Name:  Louis Valera    :  1990  MRN: [id number]  Restrictions/Precautions:    Medical/Treatment Diagnosis Information:  Diagnosis: M25.561, G89.29 (ICD-10-CM) - Chronic pain of right knee; s/p right knee fat pad excision. Surgery on 21  Treatment Diagnosis: M25.561, G89.29 (ICD-10-CM) - Chronic pain of right knee; s/p right knee fat pad excision.   Surgery on   Insurance/Certification information:  PT Insurance Information: [employer]  Physician Information:  Referring Practitioner: Rosmery Wright  Has the plan of care been signed (Y/N):        []  Yes  [x]  No     Date of Patient follow up with Physician: 28 Aug 21      Is this a Progress Report:     []  Yes  [x]  No        If Yes:  Date Range for reporting period:  Beginning 20 Aug 21  Ending    Progress report will be due (10 Rx or 30 days whichever is less): visit 10 or        Recertification will be due (POC Duration  / 90 days whichever is less): see above         Visit # Insurance Allowable Auth Required   5 30 []  Yes [x]  No        Functional Scale: LEFS-10%   Date assessed: 2021    Latex Allergy:  [x]NO      []YES  Preferred Language for Healthcare:   [x]English       []other:      Pain level:  See eval     SUBJECTIVE:  See eval    OBJECTIVE: See eval   Observation:    Test measurements:      Flexibility L R Comment   Hamstring      Gastroc      ITB      Quad                ROM PROM AROM Overpressure Comment    L R L R L R    Flexion          Extension                                  Strength L R Comment   Quad      Hamstring      Gastroc      Hip flexor      Hip ABD Special Test Results/Comment   Meniscal Click    Crepitus    Flexion Test    Valgus Laxity    Varus Laxity    Lachmans    Drop Back    Homans            Girth L R   Mid Patella     Suprapatellar     5cm above     15cm above       Boo Clinical Decision Rule for DVT    Clinical Presentation  Possible Score  Clients Score    Active cancer (within 6 months of diagnosis or receiving palliative care)  1     Paralysis, paresis, or recent immobilization of lower extremity  1     Bedridden for more than 3 days or major surgery in the last 4 weeks  1     Localized tenderness in the center of the posterior calf, the popliteal space, or along the femoral vein in the anterior thigh/groin  1     Entire lower extremity swelling  1     Unilateral calf swelling (more than 3 cm larger than uninvolved side)  1     Unilateral pitting edema  1     Collateral superficial veins (nonvaricose)  1     An alternative diagnosis is as likely (or more likely) than DVT (e.g., cellulitis, postoperative swelling, calf strain)  -2     Total Points   -2     Key  · -2 to 0 Low probability of DVT 3% (95% confidence interval [CI] 1.7%-5.9%)  · 1 to 2 Moderate probability of DVT 17% (95% confidence interval [CI] 12%-23%)  · 3 or more High probability of DVT 75% (95% confidence interval [CI] 63%-84%)    Medical consultation is advised in the presence of low probability  Medical referral is required with moderate or high score. Boo PS, Leodan ABRAHAM, Sabrina J, et al: Value of assessment of pretest probability of deep-vein thrombosis in clinical management, Lancet 405:8308-9855, 1997.       RESTRICTIONS/PRECAUTIONS: h/o depression; h/o COVID-19 (in October 2020) without lasting effects; h/o corrective jaw surgery     Exercises/Interventions:     Exercise/Equipment Repetitions/Resistance Other comments   Stretching     Hamstring 5x:30    Hip Flexion     ITB- Rope     Grion     Quad     Inclined Calf     Towel Pull 5x:30    Piriformis SLR     Supine 1x10    Prone     Abduction NV? Adducton     SLR+          Isometrics     [employer] 10x:10    [employer] Squeezes 10x:10    Patellar Glides     Medial     Superior     Inferior          ROM     Passive     Active     Boo [place] Shift     Hang Weights     Sheet Pulls 10x:10    Ankle Pumps 2'         CKC     Calf raises     Wall sits     Step ups     1 leg stand     Squatting     CC TKE     Balance     Monster Walks     Bridging     Triple threats     Stool Scoots     PRE     Extension  RANGE:   Flexion  RANGE:        Cable Column          Leg Press  RANGE:        Bike     Treadmill            Access Code: [email or web address]  URL: [email or web address]  Date: 08/20/2021  Prepared by: Emily Carey    Exercises  Seated Table Hamstring Stretch - 2 x daily - 7 x weekly - 5 sets - 30 hold  Seated Gastroc Stretch with Strap - 2 x daily - 7 x weekly - 5 sets - 30 hold  Supine Quadricep Sets - 10 x daily - 7 x weekly - 10 sets - 1 reps - 10 hold  Supine Straight Leg Raises - 2 x daily - 7 x weekly - 5-10 reps - 1-3 sets  Supine Heel Slide with Strap - 2 x daily - 7 x weekly - 10 sets - 10 hold  Supine Hip Adduction Isometric with Ball - 1 x daily - 7 x weekly - 10 sets - 10 hold  Supine Ankle Pumps - 10 x daily - 7 x weekly - 3 sets - 10 reps    Therapeutic Exercise and NMR EXR  [x](30546) Provided verbal/tactile cueing for activities related to strengthening, flexibility, endurance, ROM for improvements in LE, proximal hip, and core control with self care, mobility, lifting, ambulation. [x] (33743) Provided verbal/tactile cueing for activities related to improving balance, coordination, kinesthetic sense, posture, motor skill, proprioception  to assist with LE, proximal hip, and core control in self care, mobility, lifting, ambulation and eccentric single leg control.      NMR and Therapeutic Activities:    [x] (94152 or 97031) Provided verbal/tactile cueing for activities related to improving balance, coordination, kinesthetic sense, posture, motor skill, proprioception and motor activation to allow for proper function of core, proximal hip and LE with self care and ADLs  [x] (55996) Gait Re-education- Provided training and instruction to the patient for proper LE, core and proximal hip recruitment and positioning and eccentric body weight control with ambulation re-education including up and down stairs     Home Exercise Program:    [x] (18544) Reviewed/Progressed HEP activities related to strengthening, flexibility, endurance, ROM of core, proximal hip and LE for functional self-care, mobility, lifting and ambulation/stair navigation   [x] (31140)Reviewed/Progressed HEP activities related to improving balance, coordination, kinesthetic sense, posture, motor skill, proprioception of core, proximal hip and LE for self care, mobility, lifting, and ambulation/stair navigation      Manual Treatments:  PROM / STM / Oscillations-Mobs:  G-I, II, III, IV (PA's, Inf., Post.)  [x](60420) Provided manual therapy to mobilize LE, proximal hip and/or LS spine soft tissue/joints for the purpose of modulating pain, promoting relaxation,  increasing ROM, reducing/eliminating soft tissue swelling/inflammation/restriction, improving soft tissue extensibility and allowing for proper ROM for normal function with self care, mobility, lifting and ambulation. Other:  Patient education on PT and plan of care including diagnosis, prognosis, treatment goals and options. Patient agrees with discussed POC and treatment and is aware of rehab process. Pt was also educated on clinic layout and use of modalities. PT gave pt business card to call if any questions. Pt was ed on WB, AD use, showering, wound care, and protocol. Pt was also ed on S&S of DVT and PE and infection and what to do if these are experienced.         Modalities:  Ice-15' Vaso medium pressure    Charges:   Timed Code Treatment Minutes: 25'   Total Treatment Minutes: 72'     Dry Needling NOT Covered  [x]EVAL (LOW) 12086   [] EVAL (MOD) 42582   []  EVAL (HIGH) 12025   []  RE-EVAL   [x]  KELLE(10863) x 1    []IONTO  []  NMR (83892) x     [x]  VASO  []  Manual (80740) x      [] Other:  [x]  TA x  1    [] Mech Traction (34561)  []  ES(attended) (67465)      []ES (un) (68583):     GOALS:   Patient stated goal: walk without pain and locking; return to sports (snoboarding, wakeboarding and volleyball)    []? Progressing: []? Met: []? Not Met: []? Adjusted     Therapist goals for Patient:   Short Term Goals: To be achieved in: 2 weeks  1. Independent in HEP and progression per patient tolerance, in order to prevent re-injury. []? Progressing: []? Met: []? Not Met: []? Adjusted   2. Patient will have a decrease in pain of 5/10 at worst to facilitate improvement in movement, function, and ADLs as indicated by functional deficits. []? Progressing: []? Met: []? Not Met: []? Adjusted     Long Term Goals: To be achieved in: 8 weeks  1. Pt will demo a LEFS score of 85% or better to assist with reaching prior level of function. []? Progressing: []? Met: []? Not Met: []? Adjusted  2. Patient will demonstrate increased AROM to knee flexion to greater than or equal to 140 and knee ext to 0 to allow for proper joint functioning as indicated by patients functional deficits. []? Progressing: []? Met: []? Not Met: []? Adjusted  3. Patient will demonstrate an increase in strength of hip flex, ABD, and knee flex/ext to 4+/5 to allow for proper functional mobility as indicated by patients functional deficits. []? Progressing: []? Met: []? Not Met: []? Adjusted  4. Patient will return to amb in the community without increased symptoms or restriction. []? Progressing: []? Met: []? Not Met: []? Adjusted  5. Pt will return to full work duty without limitations. []? Progressing: []? Met: []? Not Met: []?  Adjusted       Overall Progression Towards Functional goals/ Treatment Progress Update:  [] Patient is progressing as expected towards functional goals listed. [] Progression is slowed due to complexities/Impairments listed. [] Progression has been slowed due to co-morbidities. [x] Plan just implemented, too soon to assess goals progression <30days   [] Goals require adjustment due to lack of progress  [] Patient is not progressing as expected and requires additional follow up with physician  [] Other    Prognosis for POC: [x] Good [] Fair  [] Poor      Patient requires continued skilled intervention: [x] Yes  [] No    Treatment/Activity Tolerance:  [x] Patient able to complete treatment  [] Patient limited by fatigue  [] Patient limited by pain     [] Patient limited by other medical complications  [] Other: Pt is a 27 y/o female presenting s/p right knee fat pad excision with ganglion cyst removal from the MD.  Clinically, the pt presents with decreased ROM, decreased strength, decreased function, and increased pain consistent with the MD diagnosis. The pt would benefit from skilled PT to return to PLOF. Pt has been seen for PT this year. She states it made her worse. She is slightly apprehensive about putting weight on her leg, but this did improve with practice. She has been dealing with her knee for a long time. She does have some apprehension with moving her leg and some of the exercises. This did improve over time. I did ask her to prop her leg to get the last few degrees of extension, and we did review gt and gt with crutches PWB. She was able to demonstrate this properly before leaving the clinic today. Pt's benefits were reviewed. All questions were answered. Pt is agreeable. PLAN: If pt doesn't return, this note can be considered a D/C note.   See eval  [] Continue per plan of care [] Alter current plan (see comments above)  [x] Plan of care initiated [] Hold pending MD visit [] Discharge  Electronically signed by: Russell Lagunas PT PT, DPT [id number]

## 2021-08-20 NOTE — PROGRESS NOTES
Raghu Coto returns today 2 days status post right knee arthroscopy with synovectomy and decompression of small ganglion cyst..  Overall she is doing well. Pain is appropriately managed. I removed her bandages and placed new Steri-Strips and Tegaderm today. Incisions look good without infection. She has a small but soft effusion that does not require aspiration. Calf is soft without DVT. I reviewed her arthroscopic photos with her. She will start therapy and follow-up with me in a week for suture removal.        Patient's medications, allergies, past medical, surgical, social and family histories were reviewed and updated as appropriate. Relevant review of systems reviewed. This note was created using voice recognition software. It has been proofread, but occasionally errors remain. Please disregard these errors. They will be corrected as they are noted.

## 2021-08-20 NOTE — PLAN OF CARE
so     [facility] a Department of [address]  [place] [address], [address]  Office: [phone]  Fax:  [phone]                                                         Physical Therapy Certification    Dear Referring Practitioner: Marleny Solis,    We had the pleasure of evaluating the following patient for physical therapy services at [address] [place] [address]. A summary of our findings can be found in the initial assessment below. This includes our plan of care. If you have any questions or concerns regarding these findings, please do not hesitate to contact me at the office phone number checked above. Thank you for the referral.       Physician Signature:_______________________________Date:__________________  By signing above (or electronic signature), therapists plan is approved by physician      Patient: Soheila Jane   : 1990   MRN: [id number]  Referring Physician: Referring Practitioner: Marleny Solis      Evaluation Date: 2021      Medical Diagnosis Information:  Diagnosis: M25.561, G89.29 (ICD-10-CM) - Chronic pain of right knee; s/p right knee fat pad excision. Surgery on 21   Treatment Diagnosis: M25.561, G89.29 (ICD-10-CM) - Chronic pain of right knee; s/p right knee fat pad excision. Surgery on 21                                           Precautions/ Contra-indications: h/o depression; h/o COVID-19 (in 2020) without lasting effects; Latex Allergy:  [x]NO      []YES  Preferred Language for Healthcare:   [x]English       []other:    SUBJECTIVE: Patient stated complaint: Pt is staying at parents' house due to easier ability to get around. Pt has had knee pain since March. She said she was waiting for a friend to come down the hill at [employer]. She had a sudden pain when she screamed out in pain and fell down. This happened several times.   She went to [facility] for Xrays. Saw [facility] in [place]. PT did not help. She had a MRI. This showed a fat pad issue. She did get an injection in this, but it didn't help. The pain got worse as the day progresses. She did feel like PT made her pain worse. For boarding her left leg is in front. She feels pain and pressure in the anterior knee. Relevant Medical History:h/o depression; h/o COVID-19 (in October 2020) without lasting effects; h/o corrective jaw surgery    Functional Scale:   LEFS-10%    Pain Scale: 4-5/10  Easing factors: NA surgery Wednesday  Provocative factors: NA surgery Wednesday    Type: []Constant   []Intermittent  []Radiating []Localized []other: feels tight/full     Numbness/Tingling: none    Functional Limitations/Impairments: []Sitting [x]Standing [x]Walking    [x]Squatting/bending  [x]Stairs           [x]ADL's  [x]Transfers [x]Sports/Recreation []Other:    Occupation/School: [employer] at [address] Level of Function: Independent with ADLs and IADLs, volleyball (sand); wakeboarding; snowboarding; hiking  (insert highest prior level of function)    OBJECTIVE:     Joint mobility:    []Normal    [x]Hypo   []Hyper    Palpation: -TTP in calf    Functional Mobility/Transfers: limited as listed above    Posture: FWD head    Bandages/Dressings/Incisions: Pt's dressings were removed. No S&S of infection were noted. Pt was ed on S&S of infection, DVT, wound care, and showering.       Gait: (include devices/WB status) NWB with bilateral crutches        Flexibility L R Comment   Hamstring      Gastroc      ITB      Quad                ROM PROM AROM Overpressure Comment    L R L R L R    Flexion  48 149       Extension   0 lacking 4                              Strength L R Comment   Quad      Hamstring      Gastroc      Hip flexor      Hip ABD                    Orthopedic Special Tests:   Special Test Results/Comment   Meniscal Click    Crepitus    Flexion Test    Valgus Laxity    Varus Laxity    Lachmans    Drop Back    Homans            Girth L R   Mid Patella     Suprapatellar     5cm above     15cm above       Boo Clinical Decision Rule for DVT    Date:  Clinical Presentation  Possible Score  Clients Score    Active cancer (within 6 months of diagnosis or receiving palliative care)  1     Paralysis, paresis, or recent immobilization of lower extremity  1     Bedridden for more than 3 days or major surgery in the last 4 weeks  1     Localized tenderness in the center of the posterior calf, the popliteal space, or along the femoral vein in the anterior thigh/groin  1     Entire lower extremity swelling  1     Unilateral calf swelling (more than 3 cm larger than uninvolved side)  1     Unilateral pitting edema  1     Collateral superficial veins (nonvaricose)  1     An alternative diagnosis is as likely (or more likely) than DVT (e.g., cellulitis, postoperative swelling, calf strain)  -2  -2   Total Points   -2      Key  · -2 to 0 Low probability of DVT 3% (95% confidence interval [CI] 1.7%-5.9%)  · 1 to 2 Moderate probability of DVT 17% (95% confidence interval [CI] 12%-23%)  · 3 or more High probability of DVT 75% (95% confidence interval [CI] 63%-84%)    Medical consultation is advised in the presence of low probability  Medical referral is required with moderate or high score. Boo PS, Leodan DR, Sabrina J, et al: Value of assessment of pretest probability of deep-vein thrombosis in clinical management, Lancet 721:7845-0064, 1997. [x] Patient history, allergies, meds reviewed. Medical chart reviewed. See intake form. Review Of Systems (ROS):  [x]Performed Review of systems (Integumentary, CardioPulmonary, Neurological) by intake and observation. Intake form has been scanned into medical record. Patient has been instructed to contact their primary care physician regarding ROS issues if not already being addressed at this time. Co-morbidities/Complexities (which will affect course of rehabilitation):   []None           Arthritic conditions   []Rheumatoid arthritis (M05.9)  []Osteoarthritis (M19.91)   Cardiovascular conditions   []Hypertension (I10)  []Hyperlipidemia (E78.5)  []Angina pectoris (I20)  []Atherosclerosis (I70)   Musculoskeletal conditions   []Disc pathology   []Congenital spine pathologies   []Prior surgical intervention  []Osteoporosis (M81.8)  []Osteopenia (M85.8)   Endocrine conditions   []Hypothyroid (E03.9)  []Hyperthyroid Gastrointestinal conditions   []Constipation (C14.49)   Metabolic conditions   []Morbid obesity (E66.01)  []Diabetes type 1(E10.65) or 2 (E11.65)   []Neuropathy (G60.9)     Pulmonary conditions   []Asthma (J45)  []Coughing   []COPD (J44.9)   Psychological Disorders  []Anxiety (F41.9)  [x]Depression (F32.9) -h/o  []Other:   []Other:          Barriers to/and or personal factors that will affect rehab potential:              []Age  []Sex              []Motivation/Lack of Motivation                        []Co-Morbidities              []Cognitive Function, education/learning barriers              []Environmental, home barriers              []profession/work barriers  [x]past PT/medical experience  []other:  Justification: Pt has been seen for PT this year. She states it made her worse. She is slightly apprehensive about putting weight on her leg, but this did improve with practice. She has been dealing with her knee for a long time. She does have some apprehension with moving her leg and some of the exercises. This did improve over time. I did ask her to prop her leg to get the last few degrees of extension, and we did review gt and gt with crutches PWB. She was able to demonstrate this properly before leaving the clinic today. Falls Risk Assessment (30 days):   [x]Falls Risk assessed and no intervention required.   [] Falls Risk assessed and Patient requires intervention due to being higher risk   TUG score (>12s at risk):     [] Falls education provided, including           ASSESSMENT:   Functional Impairments:     [x]Noted lumbar/proximal hip/LE joint hypomobility   [x]Decreased LE functional ROM   [x]Decreased core/proximal hip strength and neuromuscular control   [x]Decreased LE functional strength   [x]Reduced balance/proprioceptive control   []other:      Functional Activity Limitations (from functional questionnaire and intake)   [x]Reduced ability to tolerate prolonged functional positions   [x]Reduced ability or difficulty with changes of positions or transfers between positions   [x]Reduced ability to maintain good posture and demonstrate good body mechanics with sitting, bending, and lifting   [x]Reduced ability to sleep   [x]Reduced ability or tolerance with driving and/or computer work   [x]Reduced ability to perform lifting, carrying tasks   [x]Reduced ability to squat   []Reduced ability to forward bend   [x]Reduced ability to ambulate prolonged functional periods/distances/surfaces   [x]Reduced ability to ascend/descend stairs   [x]Reduced ability to run, hop, cut or jump   []other:    Participation Restrictions   [x]Reduced participation in self care activities   [x]Reduced participation in home management activities   [x]Reduced participation in work activities   [x]Reduced participation in social activities. [x]Reduced participation in sport/recreation activities. Classification :    []Signs/symptoms consistent with post-surgical status including decreased ROM, strength and function.    []Signs/symptoms consistent with joint sprain/strain   []Signs/symptoms consistent with patella-femoral syndrome   []Signs/symptoms consistent with knee OA/hip OA   []Signs/symptoms consistent with internal derangement of knee/Hip   []Signs/symptoms consistent with functional hip weakness/NMR control      []Signs/symptoms consistent with tendinitis/tendinosis    []signs/symptoms consistent with pathology which may benefit from Dry needling      [x]other: Pt is a 25 y/o female presenting s/p right knee fat pad excision with ganglion cyst removal from the MD.  Clinically, the pt presents with decreased ROM, decreased strength, decreased function, and increased pain consistent with the MD diagnosis. The pt would benefit from skilled PT to return to PLOF. Pt has been seen for PT this year. She states it made her worse. She is slightly apprehensive about putting weight on her leg, but this did improve with practice. She has been dealing with her knee for a long time. She does have some apprehension with moving her leg and some of the exercises. This did improve over time. I did ask her to prop her leg to get the last few degrees of extension, and we did review gt and gt with crutches PWB. She was able to demonstrate this properly before leaving the clinic today. Pt's benefits were reviewed. All questions were answered. Pt is agreeable. Prognosis/Rehab Potential:      []Excellent   [x]Good    []Fair   []Poor    Tolerance of evaluation/treatment:    []Excellent   [x]Good    [x]Fair   []Poor    PLAN  Frequency/Duration:  1-2 days per week for 6-8 Weeks:  Interventions:  [x]  Therapeutic exercise including: strength training, ROM, for Lower extremity and core   [x]  NMR activation and proprioception for LE, Glutes and Core   [x]  Manual therapy as indicated for LE, Hip and spine to include: Dry Needling/IASTM, STM, PROM, Gr I-IV mobilizations, manipulation. [x] Modalities as needed that may include: thermal agents, E-stim, Biofeedback, US, iontophoresis as indicated  [x] Patient education on joint protection, postural re-education, activity modification, progression of HEP.      HEP instruction: (see scanned forms)      GOALS:   Patient stated goal: walk without pain and locking; return to sports (snoboarding, wakeboarding and volleyball)    [] Progressing: [] Met: [] Not Met: [] Adjusted    Therapist goals for Patient:   Short Term Goals: To be achieved in: 2 weeks  1. Independent in HEP and progression per patient tolerance, in order to prevent re-injury. [] Progressing: [] Met: [] Not Met: [] Adjusted   2. Patient will have a decrease in pain of 5/10 at worst to facilitate improvement in movement, function, and ADLs as indicated by functional deficits. [] Progressing: [] Met: [] Not Met: [] Adjusted    Long Term Goals: To be achieved in: 8 weeks  1. Pt will demo a LEFS score of 85% or better to assist with reaching prior level of function. [] Progressing: [] Met: [] Not Met: [] Adjusted  2. Patient will demonstrate increased AROM to knee flexion to greater than or equal to 140 and knee ext to 0 to allow for proper joint functioning as indicated by patients functional deficits. [] Progressing: [] Met: [] Not Met: [] Adjusted  3. Patient will demonstrate an increase in strength of hip flex, ABD, and knee flex/ext to 4+/5 to allow for proper functional mobility as indicated by patients functional deficits. [] Progressing: [] Met: [] Not Met: [] Adjusted  4. Patient will return to amb in the community without increased symptoms or restriction. [] Progressing: [] Met: [] Not Met: [] Adjusted  5. Pt will return to full work duty without limitations.      [] Progressing: [] Met: [] Not Met: [] Adjusted         Physical Therapy Evaluation Complexity Justification  [x] A history of present problem with:  [] no personal factors and/or comorbidities that impact the plan of care;  [x]1-2 personal factors and/or comorbidities that impact the plan of care  []3 personal factors and/or comorbidities that impact the plan of care  [x] An examination of body systems using standardized tests and measures addressing any of the following: body structures and functions (impairments), activity limitations, and/or participation restrictions;:  [] a total of 1-2 or more elements   []a total of 3 or more elements   [x]a total of 4 or more elements   [x] A clinical presentation with:  [x] stable and/or uncomplicated characteristics   [] evolving clinical presentation with changing characteristics  [] unstable and unpredictable characteristics;   [x] Clinical decision making of [x] low, [] moderate, [] high complexity using standardized patient assessment instrument and/or measurable assessment of functional outcome.     [x]EVAL (LOW) 88700 (typically 20 minutes face-to-face)  []EVAL (MOD) 00139 (typically 30 minutes face-to-face)  []EVAL (HIGH) 24569 (typically 45 minutes face-to-face)  []RE-EVAL 80104    Electronically signed by:  Tab Davey, PT, PT, DPT [id number]

## 2021-08-24 ENCOUNTER — HOSPITAL ENCOUNTER (OUTPATIENT)
Dept: PHYSICAL THERAPY | Age: 31
Setting detail: THERAPIES SERIES
Discharge: HOME | End: 2021-08-24
Payer: COMMERCIAL

## 2021-08-24 PROCEDURE — 97530 THERAPEUTIC ACTIVITIES: CPT | Performed by: PHYSICAL THERAPIST

## 2021-08-24 PROCEDURE — 97110 THERAPEUTIC EXERCISES: CPT | Performed by: PHYSICAL THERAPIST

## 2021-08-24 NOTE — FLOWSHEET NOTE
surgical LE and decreased stance time.  Test measurements:      Flexibility L R Comment   Hamstring      Gastroc      ITB      Quad                ROM PROM AROM Overpressure Comment    L R L R L R    Flexion   113 sheet pulls       Extension    0                              Strength L R Comment   Quad      Hamstring      Gastroc      Hip flexor      Hip ABD                      Special Test Results/Comment   Meniscal Click    Crepitus    Flexion Test    Valgus Laxity    Varus Laxity    Lachmans    Drop Back    Homans            Girth L R   Mid Patella     Suprapatellar     5cm above     15cm above       Boo Clinical Decision Rule for DVT    Clinical Presentation  Possible Score  Clients Score    Active cancer (within 6 months of diagnosis or receiving palliative care)  1     Paralysis, paresis, or recent immobilization of lower extremity  1     Bedridden for more than 3 days or major surgery in the last 4 weeks  1     Localized tenderness in the center of the posterior calf, the popliteal space, or along the femoral vein in the anterior thigh/groin  1     Entire lower extremity swelling  1     Unilateral calf swelling (more than 3 cm larger than uninvolved side)  1     Unilateral pitting edema  1     Collateral superficial veins (nonvaricose)  1     An alternative diagnosis is as likely (or more likely) than DVT (e.g., cellulitis, postoperative swelling, calf strain)  -2     Total Points   -2     Key  · -2 to 0 Low probability of DVT 3% (95% confidence interval [CI] 1.7%-5.9%)  · 1 to 2 Moderate probability of DVT 17% (95% confidence interval [CI] 12%-23%)  · 3 or more High probability of DVT 75% (95% confidence interval [CI] 63%-84%)    Medical consultation is advised in the presence of low probability  Medical referral is required with moderate or high score.       Boo PS, Leodan ABRAHAM, Sabrina J, et al: Value of assessment of pretest probability of deep-vein thrombosis in clinical management, Lancet 756:1277-2134, 1997. RESTRICTIONS/PRECAUTIONS: h/o depression; h/o COVID-19 (in October 2020) without lasting effects; h/o corrective jaw surgery     Exercises/Interventions:     Exercise/Equipment Repetitions/Resistance Other comments   Stretching     Hamstring 5x:30    Hip Flexion     ITB- Rope     Grion     Quad     Inclined Calf 5x:30    Towel Pull     Piriformis                    SLR     Supine 3x10    Prone     Abduction 3x10    Adducton 3x10    SLR+          Isometrics     Quad sets 10x:10    Ball Squeezes     Patellar Glides     Medial     Superior     Inferior          ROM     Passive     Active     Weight Shift     Hang Weights     Sheet Pulls 10x:10    Ankle Pumps          CKC     Calf raises 3x10    Wall sits     Step ups     1 leg stand     Squatting     CC TKE     Balance     Monster Walks     Bridging     Triple threats     Stool Scoots     PRE     Extension  RANGE:   Flexion  RANGE:        Cable Column          Leg Press Isometric NV? RANGE:        Bike 5' ROM    Treadmill          Cone walks 10x all cones                Access Code: [email or web address]  URL: [email or web address]  Date: 08/24/2021  Prepared by: George Crystal Cessna    Exercises  Seated Table Hamstring Stretch - 2 x daily - 7 x weekly - 5 sets - 30 hold  Gastroc Stretch on Wall - 2 x daily - 7 x weekly - 5 sets - 30 hold  Supine Quadricep Sets - 10 x daily - 7 x weekly - 10 sets - 1 reps - 10 hold  Supine Ankle Pumps - 10 x daily - 7 x weekly - 3 sets - 10 reps  Supine Straight Leg Raises - 2 x daily - 7 x weekly - 10 reps - 3 sets  Sidelying Hip Adduction - 2 x daily - 7 x weekly - 3 sets - 10 reps  Sidelying Hip Abduction - 2 x daily - 7 x weekly - 3 sets - 10 reps  Supine Heel Slide with Strap - 2 x daily - 7 x weekly - 10 sets - 10 hold  Standing Heel Raise - 2 x daily - 7 x weekly - 3 sets - 10 reps      Therapeutic Exercise and NMR EXR  [x](49463) Provided verbal/tactile cueing for activities related to strengthening, flexibility, endurance, ROM for improvements in LE, proximal hip, and core control with self care, mobility, lifting, ambulation. [x] (74746) Provided verbal/tactile cueing for activities related to improving balance, coordination, kinesthetic sense, posture, motor skill, proprioception  to assist with LE, proximal hip, and core control in self care, mobility, lifting, ambulation and eccentric single leg control. NMR and Therapeutic Activities:    [x] (95257 or 97614) Provided verbal/tactile cueing for activities related to improving balance, coordination, kinesthetic sense, posture, motor skill, proprioception and motor activation to allow for proper function of core, proximal hip and LE with self care and ADLs  [x] (90977) Gait Re-education- Provided training and instruction to the patient for proper LE, core and proximal hip recruitment and positioning and eccentric body weight control with ambulation re-education including up and down stairs     Home Exercise Program:    [x] (77315) Reviewed/Progressed HEP activities related to strengthening, flexibility, endurance, ROM of core, proximal hip and LE for functional self-care, mobility, lifting and ambulation/stair navigation   [x] (30077)Reviewed/Progressed HEP activities related to improving balance, coordination, kinesthetic sense, posture, motor skill, proprioception of core, proximal hip and LE for self care, mobility, lifting, and ambulation/stair navigation      Manual Treatments:  PROM / STM / Oscillations-Mobs:  G-I, II, III, IV (PA's, Inf., Post.)  [x](78921) Provided manual therapy to mobilize LE, proximal hip and/or LS spine soft tissue/joints for the purpose of modulating pain, promoting relaxation,  increasing ROM, reducing/eliminating soft tissue swelling/inflammation/restriction, improving soft tissue extensibility and allowing for proper ROM for normal function with self care, mobility, lifting and ambulation.      Other:  Patient education on PT and plan of care including diagnosis, prognosis, treatment goals and options. Patient agrees with discussed POC and treatment and is aware of rehab process. Pt was also educated on clinic layout and use of modalities. PT gave pt business card to call if any questions. Pt was ed on WB, AD use, showering, wound care, and protocol. Pt was also ed on S&S of DVT and PE and infection and what to do if these are experienced. Modalities:  15' ice     Charges:   Timed Code Treatment Minutes: 25'   Total Treatment Minutes: 72'     Dry Needling NOT Covered  []EVAL (LOW) 29650   [] EVAL (MOD) 14697   []  EVAL (HIGH) 57346   []  RE-EVAL   [x]  CX(96337) x 1    []IONTO  []  NMR (17921) x     []  VASO  []  Manual (92088) x      [] Other:  [x]  TA x  1    [] Mech Traction (56782)  []  ES(attended) (32158)      []ES (un) (53036):     GOALS:   Patient stated goal: walk without pain and locking; return to sports (snoboarding, wakeboarding and volleyball)    []? Progressing: []? Met: []? Not Met: []? Adjusted     Therapist goals for Patient:   Short Term Goals: To be achieved in: 2 weeks  1. Independent in HEP and progression per patient tolerance, in order to prevent re-injury. []? Progressing: []? Met: []? Not Met: []? Adjusted   2. Patient will have a decrease in pain of 5/10 at worst to facilitate improvement in movement, function, and ADLs as indicated by functional deficits. []? Progressing: []? Met: []? Not Met: []? Adjusted     Long Term Goals: To be achieved in: 8 weeks  1. Pt will demo a LEFS score of 85% or better to assist with reaching prior level of function. []? Progressing: []? Met: []? Not Met: []? Adjusted  2. Patient will demonstrate increased AROM to knee flexion to greater than or equal to 140 and knee ext to 0 to allow for proper joint functioning as indicated by patients functional deficits. []? Progressing: []? Met: []? Not Met: []? Adjusted  3.  Patient will demonstrate an increase in strength of hip flex, ABD, and knee flex/ext to 4+/5 to allow for proper functional mobility as indicated by patients functional deficits. []? Progressing: []? Met: []? Not Met: []? Adjusted  4. Patient will return to amb in the community without increased symptoms or restriction. []? Progressing: []? Met: []? Not Met: []? Adjusted  5. Pt will return to full work duty without limitations. []? Progressing: []? Met: []? Not Met: []? Adjusted       Overall Progression Towards Functional goals/ Treatment Progress Update:  [] Patient is progressing as expected towards functional goals listed. [] Progression is slowed due to complexities/Impairments listed. [] Progression has been slowed due to co-morbidities. [x] Plan just implemented, too soon to assess goals progression <30days   [] Goals require adjustment due to lack of progress  [] Patient is not progressing as expected and requires additional follow up with physician  [] Other    Prognosis for POC: [x] Good [] Fair  [] Poor      Patient requires continued skilled intervention: [x] Yes  [] No    Treatment/Activity Tolerance:  [x] Patient able to complete treatment  [] Patient limited by fatigue  [] Patient limited by pain     [] Patient limited by other medical complications  [] Other: Pt nazario tx well. She demo improved confidence in her leg. I did ed her on using at least 1 AD as she had antalgic gt. We reviewed how to do this. I also asked her to work on walking at home to try to replicate cone walks. She was understanding. She was able to progress her exercises without c/o. She does tend to hold her knee in ext. I did ed her on focusing more on flexion now instead of always holding it straight. She can keep it straight t/o the day intermittently. She is understanding. Pt would benefit from skilled PT to improve strength, ROM, gt, pain, and function. PLAN: If pt doesn't return, this note can be considered a D/C note.   Consider more time on bike for ROM, leg press isometrics.    [x] Continue per plan of care [] Alter current plan (see comments above)  [] Plan of care initiated [] Hold pending MD visit [] Discharge  Electronically signed by: Yehuda Tellez, PT PT, DPT [id number]

## 2021-08-27 ENCOUNTER — HOSPITAL ENCOUNTER (OUTPATIENT)
Dept: PHYSICAL THERAPY | Age: 31
Setting detail: THERAPIES SERIES
Discharge: HOME | End: 2021-08-27
Payer: COMMERCIAL

## 2021-08-27 ENCOUNTER — OFFICE VISIT (OUTPATIENT)
Dept: ORTHOPEDIC SURGERY | Age: 31
End: 2021-08-27

## 2021-08-27 VITALS — RESPIRATION RATE: 12 BRPM | HEIGHT: 68 IN | BODY MASS INDEX: 17.43 KG/M2 | WEIGHT: 115 LBS

## 2021-08-27 DIAGNOSIS — M65.9 SYNOVITIS OF RIGHT KNEE: Primary | ICD-10-CM

## 2021-08-27 PROCEDURE — 97112 NEUROMUSCULAR REEDUCATION: CPT | Performed by: PHYSICAL THERAPIST

## 2021-08-27 PROCEDURE — 97110 THERAPEUTIC EXERCISES: CPT | Performed by: PHYSICAL THERAPIST

## 2021-08-27 PROCEDURE — 97530 THERAPEUTIC ACTIVITIES: CPT | Performed by: PHYSICAL THERAPIST

## 2021-08-27 PROCEDURE — 99024 POSTOP FOLLOW-UP VISIT: CPT | Performed by: ORTHOPAEDIC SURGERY

## 2021-08-27 NOTE — PROGRESS NOTES
Cami Ma returns today a week out from right knee arthroscopy with synovectomy. She is doing well. She has no significant pain. Today, incisions look good without infection. Calf is soft without DVT. I removed her sutures and placed new Steri-Strips and Tegaderm. She can return to work on Monday but is limited to no emergency room or operating room procedures because of standing. Follow-up with me in 3 weeks. Patient's medications, allergies, past medical, surgical, social and family histories were reviewed and updated as appropriate. Relevant review of systems reviewed. This note was created using voice recognition software. It has been proofread, but occasionally errors remain. Please disregard these errors. They will be corrected as they are noted.

## 2021-08-27 NOTE — FLOWSHEET NOTE
[facility] a Department of [address]  [address] [address], [address]  Office: [phone]  Fax:  [phone]                                                       Physical Therapy Treatment Note/ Progress Report:           Date:  2021    Patient Name:  Denis Winters    :  1990  MRN: [id number]  Restrictions/Precautions:    Medical/Treatment Diagnosis Information:  Diagnosis: M25.561, G89.29 (ICD-10-CM) - Chronic pain of right knee; s/p right knee fat pad excision. Surgery on 21  Treatment Diagnosis: M25.561, G89.29 (ICD-10-CM) - Chronic pain of right knee; s/p right knee fat pad excision. Surgery on   Insurance/Certification information:  PT Insurance Information: [facility]  Physician Information:  Referring Practitioner: Geoff Mayer  Has the plan of care been signed (Y/N):        []  Yes  [x]  No     Date of Patient follow up with Physician: 28 Aug 21      Is this a Progress Report:     []  Yes  [x]  No        If Yes:  Date Range for reporting period:  Beginning 20 Aug 21  Ending    Progress report will be due (10 Rx or 30 days whichever is less): visit 10 or        Recertification will be due (POC Duration  / 90 days whichever is less): see above         Visit # Insurance Allowable Auth Required   7 30 []  Yes [x]  No        Functional Scale: LEFS-10%   Date assessed: 2021    Latex Allergy:  [x]NO      []YES  Preferred Language for Healthcare:   [x]English       []other:      Pain level:  2/10    SUBJECTIVE:  She brought her paperwork for return to work. She feels her pain is because it is simply because it's the morning and she just got up. OBJECTIVE: 27 Aug 21    Observation: incision covered with Tegaderm without drainage noted. She does present into clinic today without AD with decreased swing in surgical LE.    Test measurements:      Flexibility L R Comment Hamstring      Gastroc      ITB      Quad                ROM PROM AROM Overpressure Comment    L R L R L R    Flexion   127 sheet pulls       Extension    Hyper 3                              Strength L R Comment   Quad      Hamstring      Gastroc      Hip flexor      Hip ABD                      Special Test Results/Comment   Meniscal Click    Crepitus    Flexion Test    Valgus Laxity    Varus Laxity    Lachmans    Drop Back    Homans            Girth L R   Mid Patella     Suprapatellar     5cm above     15cm above       Boo Clinical Decision Rule for DVT    Clinical Presentation  Possible Score  Clients Score    Active cancer (within 6 months of diagnosis or receiving palliative care)  1     Paralysis, paresis, or recent immobilization of lower extremity  1     Bedridden for more than 3 days or major surgery in the last 4 weeks  1     Localized tenderness in the center of the posterior calf, the popliteal space, or along the femoral vein in the anterior thigh/groin  1     Entire lower extremity swelling  1     Unilateral calf swelling (more than 3 cm larger than uninvolved side)  1     Unilateral pitting edema  1     Collateral superficial veins (nonvaricose)  1     An alternative diagnosis is as likely (or more likely) than DVT (e.g., cellulitis, postoperative swelling, calf strain)  -2     Total Points   -2     Key  · -2 to 0 Low probability of DVT 3% (95% confidence interval [CI] 1.7%-5.9%)  · 1 to 2 Moderate probability of DVT 17% (95% confidence interval [CI] 12%-23%)  · 3 or more High probability of DVT 75% (95% confidence interval [CI] 63%-84%)    Medical consultation is advised in the presence of low probability  Medical referral is required with moderate or high score. Boo PS, Leodan DR, Sabrina J, et al: Value of assessment of pretest probability of deep-vein thrombosis in clinical management, Lancet 229:3604-0532, 1997.       RESTRICTIONS/PRECAUTIONS: h/o depression; h/o COVID-19 (in October 2020) without lasting effects; h/o corrective jaw surgery     Exercises/Interventions:     Exercise/Equipment Repetitions/Resistance Other comments   Stretching     Hamstring 5x:30    Hip Flexion     ITB- Rope     Grion     Quad     Inclined Calf 5x:30    Towel Pull     Piriformis                    SLR     Supine 3x10    Prone     Abduction 3x10    Adducton 3x10    SLR+          Isometrics     Quad sets     Ball Squeezes     Patellar Glides     Medial     Superior     Inferior          ROM     Passive     Active     Weight Shift     Hang Weights     Sheet Pulls 10x:10    Ankle Pumps          CKC     Calf raises 3x10 ECL    Wall sits     Step ups     1 leg stand 5x:30    Squatting     CC TKE 3x10 45#    Balance     Monster Walks     Bridging     Triple threats     Stool Scoots     PRE     Extension  RANGE:   Flexion  RANGE:        Cable Column          Leg Press 10x:10 RANGE: 70        Bike 5' ROM    Treadmill          Cone walks 10x all cones                Access Code: [email or web address]  URL: [email or web address]  Date: 08/24/2021  Prepared by: Fadumo Carey    Exercises  Seated Table Hamstring Stretch - 2 x daily - 7 x weekly - 5 sets - 30 hold  Gastroc Stretch on Wall - 2 x daily - 7 x weekly - 5 sets - 30 hold  Supine Quadricep Sets - 10 x daily - 7 x weekly - 10 sets - 1 reps - 10 hold  Supine Ankle Pumps - 10 x daily - 7 x weekly - 3 sets - 10 reps  Supine Straight Leg Raises - 2 x daily - 7 x weekly - 10 reps - 3 sets  Sidelying Hip Adduction - 2 x daily - 7 x weekly - 3 sets - 10 reps  Sidelying Hip Abduction - 2 x daily - 7 x weekly - 3 sets - 10 reps  Supine Heel Slide with Strap - 2 x daily - 7 x weekly - 10 sets - 10 hold  Standing Heel Raise - 2 x daily - 7 x weekly - 3 sets - 10 reps      Therapeutic Exercise and NMR EXR  [x](83618) Provided verbal/tactile cueing for activities related to strengthening, flexibility, endurance, ROM for improvements in LE, proximal hip, and core control with self care, mobility, lifting, ambulation. [x] (42985) Provided verbal/tactile cueing for activities related to improving balance, coordination, kinesthetic sense, posture, motor skill, proprioception  to assist with LE, proximal hip, and core control in self care, mobility, lifting, ambulation and eccentric single leg control. NMR and Therapeutic Activities:    [x] (46883 or 83525) Provided verbal/tactile cueing for activities related to improving balance, coordination, kinesthetic sense, posture, motor skill, proprioception and motor activation to allow for proper function of core, proximal hip and LE with self care and ADLs  [x] (10253) Gait Re-education- Provided training and instruction to the patient for proper LE, core and proximal hip recruitment and positioning and eccentric body weight control with ambulation re-education including up and down stairs     Home Exercise Program:    [x] (82727) Reviewed/Progressed HEP activities related to strengthening, flexibility, endurance, ROM of core, proximal hip and LE for functional self-care, mobility, lifting and ambulation/stair navigation   [x] (03258)Reviewed/Progressed HEP activities related to improving balance, coordination, kinesthetic sense, posture, motor skill, proprioception of core, proximal hip and LE for self care, mobility, lifting, and ambulation/stair navigation      Manual Treatments:  PROM / STM / Oscillations-Mobs:  G-I, II, III, IV (PA's, Inf., Post.)  [x](84003) Provided manual therapy to mobilize LE, proximal hip and/or LS spine soft tissue/joints for the purpose of modulating pain, promoting relaxation,  increasing ROM, reducing/eliminating soft tissue swelling/inflammation/restriction, improving soft tissue extensibility and allowing for proper ROM for normal function with self care, mobility, lifting and ambulation.      Other:         Modalities:  15' ice     Charges:   Timed Code Treatment Minutes: 37'   Total Treatment Minutes: 76' Dry Needling NOT Covered  []EVAL (LOW) 47216   [] EVAL (MOD) 12817   []  EVAL (HIGH) 98737   []  RE-EVAL   [x]  YQ(24989) x 1    []IONTO  [x]  NMR (70401) x1     []  VASO  []  Manual (74315) x      [] Other:  [x]  TA x  1    [] Mech Traction (94369)  []  ES(attended) (96248)      []ES (un) (99612):     GOALS:   Patient stated goal: walk without pain and locking; return to sports (snoboarding, wakeboarding and volleyball)    []? Progressing: []? Met: []? Not Met: []? Adjusted     Therapist goals for Patient:   Short Term Goals: To be achieved in: 2 weeks  1. Independent in HEP and progression per patient tolerance, in order to prevent re-injury. []? Progressing: []? Met: []? Not Met: []? Adjusted   2. Patient will have a decrease in pain of 5/10 at worst to facilitate improvement in movement, function, and ADLs as indicated by functional deficits. []? Progressing: []? Met: []? Not Met: []? Adjusted     Long Term Goals: To be achieved in: 8 weeks  1. Pt will demo a LEFS score of 85% or better to assist with reaching prior level of function. []? Progressing: []? Met: []? Not Met: []? Adjusted  2. Patient will demonstrate increased AROM to knee flexion to greater than or equal to 140 and knee ext to 0 to allow for proper joint functioning as indicated by patients functional deficits. []? Progressing: []? Met: []? Not Met: []? Adjusted  3. Patient will demonstrate an increase in strength of hip flex, ABD, and knee flex/ext to 4+/5 to allow for proper functional mobility as indicated by patients functional deficits. []? Progressing: []? Met: []? Not Met: []? Adjusted  4. Patient will return to amb in the community without increased symptoms or restriction. []? Progressing: []? Met: []? Not Met: []? Adjusted  5. Pt will return to full work duty without limitations. []? Progressing: []? Met: []? Not Met: []?  Adjusted       Overall Progression Towards Functional goals/ Treatment Progress Update:  [] Patient is progressing as expected towards functional goals listed. [] Progression is slowed due to complexities/Impairments listed. [] Progression has been slowed due to co-morbidities. [x] Plan just implemented, too soon to assess goals progression <30days   [] Goals require adjustment due to lack of progress  [] Patient is not progressing as expected and requires additional follow up with physician  [] Other    Prognosis for POC: [x] Good [] Fair  [] Poor      Patient requires continued skilled intervention: [x] Yes  [] No    Treatment/Activity Tolerance:  [x] Patient able to complete treatment  [] Patient limited by fatigue  [] Patient limited by pain     [] Patient limited by other medical complications  [] Other: Pt nazario tx well. She enters the clinic with decreased swing with gt. This did improve with cone walks and practice. We did review this today to work on her gt. She nazario progressions well. She had no c/o t/o tx. She has made good progress over the last week. She does have difficulty with steps. We did review driving and when it's safe to do so (off crutches, not on narcotics, and is safe to operate the vehicle.)  I've asked her to not take walks yet until we normalize her gt. Pt would benefit from skilled PT to improve strength, ROM, gt, pain, and function. PLAN: If pt doesn't return, this note can be considered a D/C note. Consider hamstring curls, progressing balance, biodex balance, progressing SLR. Check pt's nazario with part return to work starting on Monday. Pt is only off 2 days next week, and thus, will only be able to come once next week.    [x] Continue per plan of care [] Alter current plan (see comments above)  [] Plan of care initiated [] Hold pending MD visit [] Discharge  Electronically signed by: Adiel Hernandez, PT PT, DPT [id number]

## 2021-08-30 ENCOUNTER — TELEPHONE (OUTPATIENT)
Dept: ORTHOPEDIC SURGERY | Age: 31
End: 2021-08-30

## 2021-08-31 ENCOUNTER — HOSPITAL ENCOUNTER (OUTPATIENT)
Dept: PHYSICAL THERAPY | Age: 31
Setting detail: THERAPIES SERIES
Discharge: HOME | End: 2021-08-31
Payer: COMMERCIAL

## 2021-08-31 PROCEDURE — 97530 THERAPEUTIC ACTIVITIES: CPT | Performed by: PHYSICAL THERAPIST

## 2021-08-31 PROCEDURE — 97110 THERAPEUTIC EXERCISES: CPT | Performed by: PHYSICAL THERAPIST

## 2021-08-31 PROCEDURE — 97112 NEUROMUSCULAR REEDUCATION: CPT | Performed by: PHYSICAL THERAPIST

## 2021-08-31 NOTE — FLOWSHEET NOTE
[facility] a Department of [address]  [address], [address]  Office: [phone]  Fax:  [phone]                                                       Physical Therapy Treatment Note/ Progress Report:           Date:  2021    Patient Name:  Raghu Coto    :  1990  MRN: [id number]  Restrictions/Precautions:    Medical/Treatment Diagnosis Information:  Diagnosis: M25.561, G89.29 (ICD-10-CM) - Chronic pain of right knee; s/p right knee fat pad excision. Surgery on 21  Treatment Diagnosis: M25.561, G89.29 (ICD-10-CM) - Chronic pain of right knee; s/p right knee fat pad excision. Surgery on   Insurance/Certification information:  PT Insurance Information: Medical [place]  Physician Information:  Referring Practitioner: Haydee Sung  Has the plan of care been signed (Y/N):        []  Yes  [x]  No     Date of Patient follow up with Physician: 28 Aug 21      Is this a Progress Report:     []  Yes  [x]  No        If Yes:  Date Range for reporting period:  Beginning 20 Aug 21  Ending    Progress report will be due (10 Rx or 30 days whichever is less): visit 10 or        Recertification will be due (POC Duration  / 90 days whichever is less): see above         Visit # Insurance Allowable Auth Required    []  Yes [x]  No        Functional Scale: LEFS-10%   Date assessed: 2021    Latex Allergy:  [x]NO      []YES  Preferred Language for Healthcare:   [x]English       []other:      Pain level:  0/10    SUBJECTIVE:  She has been working on her walking form at home. This has gotten better. She can go up stairs better. Down stairs is difficult. She will return to work tomorrow. Then she will have a long weekend. She was unsure of when to wear her compression sleeve and when to take the steri strips off. She also didn't know if she should cover her incisions for showering. Sabrina NUNEZ et al: Value of assessment of pretest probability of deep-vein thrombosis in clinical management, Lancet 320:7684-2528, 1997. RESTRICTIONS/PRECAUTIONS: h/o depression; h/o COVID-19 (in October 2020) without lasting effects; h/o corrective jaw surgery     Exercises/Interventions:     Exercise/Equipment Repetitions/Resistance Other comments   Stretching     Hamstring 5x:30    Hip Flexion     ITB- Rope     Grion     Quad     Inclined Calf 5x:30    Towel Pull     Piriformis                    SLR     Supine 3x10 1#    Prone     Abduction 3x10 1#    Adducton 3x10 1#    SLR+     clams 3x10 3#              Isometrics     Quad sets     Ball Squeezes     Patellar Glides     Medial     Superior     Inferior          ROM     Passive     Active     Weight Shift     Hang Weights     Sheet Pulls 10x:10    Ankle Pumps          CKC     Calf raises 3x10 ECL    Wall sits     Step ups     1 leg stand 5x:30    Squatting 3x10    CC TKE 3x10 45#    Balance     Monster Walks     Bridging     Triple threats     Stool Scoots     PRE     Extension  RANGE:   Flexion 3x10 25# BLE RANGE: 0/90 progress NV        Cable Column          Leg Press 3x10 70# RANGE: 70/10        Bike 8'     Treadmill          Cone walks 10x all cones                Access Code: [email or web address]  URL: [email or web address]  Date: 08/31/2021  Prepared by: Ashleigh Carey    Exercises  Seated Table Hamstring Stretch - 2 x daily - 7 x weekly - 5 sets - 30 hold  Gastroc Stretch on Wall - 2 x daily - 7 x weekly - 5 sets - 30 hold  Supine Quadricep Sets - 10 x daily - 7 x weekly - 10 sets - 1 reps - 10 hold  Supine Ankle Pumps - 10 x daily - 7 x weekly - 3 sets - 10 reps  Supine Straight Leg Raises - 2 x daily - 7 x weekly - 10 reps - 3 sets  Sidelying Hip Adduction - 2 x daily - 7 x weekly - 3 sets - 10 reps  Sidelying Hip Abduction - 2 x daily - 7 x weekly - 3 sets - 10 reps  Supine Heel Slide with Strap - 2 x daily - 7 x weekly - 10 sets - 10 hold  Standing Heel Raise - 2 x daily - 7 x weekly - 3 sets - 10 reps  Clamshell - 2 x daily - 7 x weekly - 3 sets - 10 reps  Mini Squat - 1 x daily - 3-7 x weekly - 3 sets - 10 reps  Single Leg Stance - 1 x daily - 3-7 x weekly - 3-5 sets - 30 hold        Therapeutic Exercise and NMR EXR  [x](87534) Provided verbal/tactile cueing for activities related to strengthening, flexibility, endurance, ROM for improvements in LE, proximal hip, and core control with self care, mobility, lifting, ambulation. [x] (49531) Provided verbal/tactile cueing for activities related to improving balance, coordination, kinesthetic sense, posture, motor skill, proprioception  to assist with LE, proximal hip, and core control in self care, mobility, lifting, ambulation and eccentric single leg control.      NMR and Therapeutic Activities:    [x] (29459 or 61405) Provided verbal/tactile cueing for activities related to improving balance, coordination, kinesthetic sense, posture, motor skill, proprioception and motor activation to allow for proper function of core, proximal hip and LE with self care and ADLs  [x] (43441) Gait Re-education- Provided training and instruction to the patient for proper LE, core and proximal hip recruitment and positioning and eccentric body weight control with ambulation re-education including up and down stairs     Home Exercise Program:    [x] (79722) Reviewed/Progressed HEP activities related to strengthening, flexibility, endurance, ROM of core, proximal hip and LE for functional self-care, mobility, lifting and ambulation/stair navigation   [x] (73455)Reviewed/Progressed HEP activities related to improving balance, coordination, kinesthetic sense, posture, motor skill, proprioception of core, proximal hip and LE for self care, mobility, lifting, and ambulation/stair navigation      Manual Treatments:  PROM / STM / Oscillations-Mobs:  G-I, II, III, IV (PA's, Inf., Post.)  [x](33200) Provided manual therapy to mobilize LE, proximal hip and/or LS spine soft tissue/joints for the purpose of modulating pain, promoting relaxation,  increasing ROM, reducing/eliminating soft tissue swelling/inflammation/restriction, improving soft tissue extensibility and allowing for proper ROM for normal function with self care, mobility, lifting and ambulation. Other:         Modalities:  Declined. Pt to ice at home. Charges:   Timed Code Treatment Minutes: 37'   Total Treatment Minutes: 65'     Dry Needling NOT Covered  []EVAL (LOW) 14065   [] EVAL (MOD) 19948   []  EVAL (HIGH) 37532   []  RE-EVAL   [x]  LG(67072) x 1    []IONTO  [x]  NMR (17177) x1     []  VASO  []  Manual (57195) x      [] Other:  [x]  TA x  1    [] Mech Traction (25675)  []  ES(attended) (83804)      []ES (un) (45476):     GOALS:   Patient stated goal: walk without pain and locking; return to sports (snoboarding, wakeboarding and volleyball)    []? Progressing: []? Met: []? Not Met: []? Adjusted     Therapist goals for Patient:   Short Term Goals: To be achieved in: 2 weeks  1. Independent in HEP and progression per patient tolerance, in order to prevent re-injury. []? Progressing: []? Met: []? Not Met: []? Adjusted   2. Patient will have a decrease in pain of 5/10 at worst to facilitate improvement in movement, function, and ADLs as indicated by functional deficits. []? Progressing: []? Met: []? Not Met: []? Adjusted     Long Term Goals: To be achieved in: 8 weeks  1. Pt will demo a LEFS score of 85% or better to assist with reaching prior level of function. []? Progressing: []? Met: []? Not Met: []? Adjusted  2. Patient will demonstrate increased AROM to knee flexion to greater than or equal to 140 and knee ext to 0 to allow for proper joint functioning as indicated by patients functional deficits. []? Progressing: []? Met: []? Not Met: []? Adjusted  3.  Patient will demonstrate an increase in strength of hip flex, ABD, and knee flex/ext to 4+/5 to allow for proper functional mobility as indicated by patients functional deficits. []? Progressing: []? Met: []? Not Met: []? Adjusted  4. Patient will return to amb in the community without increased symptoms or restriction. []? Progressing: []? Met: []? Not Met: []? Adjusted  5. Pt will return to full work duty without limitations. []? Progressing: []? Met: []? Not Met: []? Adjusted       Overall Progression Towards Functional goals/ Treatment Progress Update:  [] Patient is progressing as expected towards functional goals listed. [] Progression is slowed due to complexities/Impairments listed. [] Progression has been slowed due to co-morbidities. [x] Plan just implemented, too soon to assess goals progression <30days   [] Goals require adjustment due to lack of progress  [] Patient is not progressing as expected and requires additional follow up with physician  [] Other    Prognosis for POC: [x] Good [] Fair  [] Poor      Patient requires continued skilled intervention: [x] Yes  [] No    Treatment/Activity Tolerance:  [x] Patient able to complete treatment  [] Patient limited by fatigue  [] Patient limited by pain     [] Patient limited by other medical complications  [] Other: Pt nazario tx well. I did encourage the pt to wear her compressive sleeve when she is up on her feet. She was instructed that she could shower without covering her incisions. I did update the pt's HEP particularly as the pt will be unable to come 2x/wk. I've asked her to get ankle weights and progress her SLR as well. Pt would benefit from skilled PT to improve strength, ROM, gt, pain, and function. PLAN: If pt doesn't return, this note can be considered a D/C note. Consider biodex balance, progressing SLR. Check pt's nazario with part return to work starting tomorrow. Due to the pt's work schedule, pt will only be able to come once next week.    [x] Continue per plan of care [] Alter current plan (see comments above)  [] Plan of care initiated [] Hold pending MD visit [] Discharge  Electronically signed by: Radonna Severs, [address], DPT [id number]

## 2021-09-07 ENCOUNTER — HOSPITAL ENCOUNTER (OUTPATIENT)
Dept: PHYSICAL THERAPY | Age: 31
Setting detail: THERAPIES SERIES
Discharge: HOME | End: 2021-09-07
Payer: COMMERCIAL

## 2021-09-07 PROCEDURE — 97112 NEUROMUSCULAR REEDUCATION: CPT | Performed by: PHYSICAL THERAPIST

## 2021-09-07 PROCEDURE — 97110 THERAPEUTIC EXERCISES: CPT | Performed by: PHYSICAL THERAPIST

## 2021-09-07 PROCEDURE — 97530 THERAPEUTIC ACTIVITIES: CPT | Performed by: PHYSICAL THERAPIST

## 2021-09-07 NOTE — FLOWSHEET NOTE
100 [facility] a Department of [address]  [facility] [place] [facility], [address]  Office: [phone]  Fax:  [phone]                                                       Physical Therapy Treatment Note/ Progress Report:           Date:  2021    Patient Name:  Maribell Campa    :  1990  MRN: [id number]  Restrictions/Precautions:    Medical/Treatment Diagnosis Information:  Diagnosis: M25.561, G89.29 (ICD-10-CM) - Chronic pain of right knee; s/p right knee fat pad excision. Surgery on 21  Treatment Diagnosis: M25.561, G89.29 (ICD-10-CM) - Chronic pain of right knee; s/p right knee fat pad excision. Surgery on   Insurance/Certification information:  PT Insurance Information: [employer]  Physician Information:  Referring Practitioner: Jacqueline Molina  Has the plan of care been signed (Y/N):        []  Yes  [x]  No     Date of Patient follow up with Physician: 28 Aug 21      Is this a Progress Report:     []  Yes  [x]  No        If Yes:  Date Range for reporting period:  Beginning 20 Aug 21  Ending    Progress report will be due (10 Rx or 30 days whichever is less): visit 10 or        Recertification will be due (POC Duration  / 90 days whichever is less): see above         Visit # Insurance Allowable Auth Required   9 30 []  Yes [x]  No        Functional Scale: LEFS-10%   Date assessed: 2021    Latex Allergy:  [x]NO      []YES  Preferred Language for Healthcare:   [x]English       []other:      Pain level:  2/10    SUBJECTIVE:  She has returned to work, which has been fine. She gets more sore as the day goes on. She feels she has noticed some more swelling on the superior part of the knee. Her steristrips fell off. She did feel like her lateral portal was still open. She did cover these with waterproof bandages.       OBJECTIVE:      Observation: incision covered with waterproof bandages. I did remove these to inspect the incision. There were no S&S of infection. She has no open wounds visible. I did cover them back with steristrips again today.  Test measurements:      Flexibility L R Comment   Hamstring      Gastroc      ITB      Quad                ROM PROM AROM Overpressure Comment    L R L R L R    Flexion   134 sheet pulls       Extension    0                              Strength L R Comment   Quad      Hamstring      Gastroc      Hip flexor      Hip ABD                      Special Test Results/Comment   Meniscal Click    Crepitus    Flexion Test    Valgus Laxity    Varus Laxity    Lachmans    Drop Back    Homans            Girth L R   Mid Patella     Suprapatellar     5cm above     15cm above       Wells Clinical Decision Rule for DVT    Clinical Presentation  Possible Score  Clients Score    Active cancer (within 6 months of diagnosis or receiving palliative care)  1     Paralysis, paresis, or recent immobilization of lower extremity  1     Bedridden for more than 3 days or major surgery in the last 4 weeks  1     Localized tenderness in the center of the posterior calf, the popliteal space, or along the femoral vein in the anterior thigh/groin  1     Entire lower extremity swelling  1     Unilateral calf swelling (more than 3 cm larger than uninvolved side)  1     Unilateral pitting edema  1     Collateral superficial veins (nonvaricose)  1     An alternative diagnosis is as likely (or more likely) than DVT (e.g., cellulitis, postoperative swelling, calf strain)  -2     Total Points   -2     Key  · -2 to 0 Low probability of DVT 3% (95% confidence interval [CI] 1.7%-5.9%)  · 1 to 2 Moderate probability of DVT 17% (95% confidence interval [CI] 12%-23%)  · 3 or more High probability of DVT 75% (95% confidence interval [CI] 63%-84%)    Medical consultation is advised in the presence of low probability  Medical referral is required with moderate or high score.       Sedonia Bullion PS, Leodan ABRAHAM, Sabrina J, et al: Value of assessment of pretest probability of deep-vein thrombosis in clinical management, Lancet 092:2654-1190, 1997. RESTRICTIONS/PRECAUTIONS: h/o depression; h/o COVID-19 (in October 2020) without lasting effects; h/o corrective jaw surgery     Exercises/Interventions:     Exercise/Equipment Repetitions/Resistance Other comments   Stretching     Hamstring 5x:30    Hip Flexion     ITB- Rope     Grion     Quad     Inclined Calf 5x:30    Towel Pull     Piriformis                    SLR     Supine 3x10 2.5#    Prone     Abduction 3x10 2.5#    Adducton 3x10 2.5#    SLR+     clams 3x10 5#              Isometrics     Quad sets     Ball Squeezes     Patellar Glides     Medial     Superior     Inferior          ROM     Passive     Active     Weight Shift     Hang Weights     Sheet Pulls 10x:10    Ankle Pumps          CKC     Calf raises 3x10 ECL    Wall sits     Step ups     1 leg stand 5x:30    Squatting 3x10 On    CC TKE 3x10 45#-30# First 5x approx at 45#   Balance     Monster Walks     Bridging     Triple threats     Stool Scoots     PRE     Extension  RANGE:   Flexion 3x10 10# SLE RANGE: 0/90         Cable Column          Leg Press 3x10 45# ECL RANGE: 70/10        Bike 8'     Treadmill          Cone walks                 Access Code: [email or web address]  URL: [email or web address]  Date: 08/31/2021  Prepared by: Aminata Carey    Exercises  Seated Table Hamstring Stretch - 2 x daily - 7 x weekly - 5 sets - 30 hold  Gastroc Stretch on Wall - 2 x daily - 7 x weekly - 5 sets - 30 hold  Supine Quadricep Sets - 10 x daily - 7 x weekly - 10 sets - 1 reps - 10 hold  Supine Ankle Pumps - 10 x daily - 7 x weekly - 3 sets - 10 reps  Supine Straight Leg Raises - 2 x daily - 7 x weekly - 10 reps - 3 sets  Sidelying Hip Adduction - 2 x daily - 7 x weekly - 3 sets - 10 reps  Sidelying Hip Abduction - 2 x daily - 7 x weekly - 3 sets - 10 reps  Supine Heel Slide with Strap - 2 x daily - 7 x weekly - 10 sets - 10 hold  Standing Heel Raise - 2 x daily - 7 x weekly - 3 sets - 10 reps  Clamshell - 2 x daily - 7 x weekly - 3 sets - 10 reps  Mini Squat - 1 x daily - 3-7 x weekly - 3 sets - 10 reps  Single Leg Stance - 1 x daily - 3-7 x weekly - 3-5 sets - 30 hold        Therapeutic Exercise and NMR EXR  [x](01179) Provided verbal/tactile cueing for activities related to strengthening, flexibility, endurance, ROM for improvements in LE, proximal hip, and core control with self care, mobility, lifting, ambulation. [x] (03187) Provided verbal/tactile cueing for activities related to improving balance, coordination, kinesthetic sense, posture, motor skill, proprioception  to assist with LE, proximal hip, and core control in self care, mobility, lifting, ambulation and eccentric single leg control.      NMR and Therapeutic Activities:    [x] (54149 or 52800) Provided verbal/tactile cueing for activities related to improving balance, coordination, kinesthetic sense, posture, motor skill, proprioception and motor activation to allow for proper function of core, proximal hip and LE with self care and ADLs  [x] (97127) Gait Re-education- Provided training and instruction to the patient for proper LE, core and proximal hip recruitment and positioning and eccentric body weight control with ambulation re-education including up and down stairs     Home Exercise Program:    [x] (69891) Reviewed/Progressed HEP activities related to strengthening, flexibility, endurance, ROM of core, proximal hip and LE for functional self-care, mobility, lifting and ambulation/stair navigation   [x] (88959)Reviewed/Progressed HEP activities related to improving balance, coordination, kinesthetic sense, posture, motor skill, proprioception of core, proximal hip and LE for self care, mobility, lifting, and ambulation/stair navigation      Manual Treatments:  PROM / STM / Oscillations-Mobs:  G-I, II, III, IV (PA's, Inf., Post.)  [x](49571) to allow for proper functional mobility as indicated by patients functional deficits. []? Progressing: []? Met: []? Not Met: []? Adjusted  4. Patient will return to amb in the community without increased symptoms or restriction. []? Progressing: []? Met: []? Not Met: []? Adjusted  5. Pt will return to full work duty without limitations. []? Progressing: []? Met: []? Not Met: []? Adjusted       Overall Progression Towards Functional goals/ Treatment Progress Update:  [] Patient is progressing as expected towards functional goals listed. [] Progression is slowed due to complexities/Impairments listed. [] Progression has been slowed due to co-morbidities. [x] Plan just implemented, too soon to assess goals progression <30days   [] Goals require adjustment due to lack of progress  [] Patient is not progressing as expected and requires additional follow up with physician  [] Other    Prognosis for POC: [x] Good [] Fair  [] Poor      Patient requires continued skilled intervention: [x] Yes  [] No    Treatment/Activity Tolerance:  [x] Patient able to complete treatment  [] Patient limited by fatigue  [] Patient limited by pain     [] Patient limited by other medical complications  [] Other: Pt nazario tx well. I did encourage the pt to continue icing and to do so more regularly to help with swelling. I instructed the pt that she could let the steristrips fall off again. When they did, she should be okay without covering them or she can use bandages if she wants. The weights were decreased on TKE due to popping in the anterior knee. This was not necessarily painful. She did have some anterior knee pain with minisquats on biodex. This improved with modified form. Pt would benefit from skilled PT to improve strength, ROM, gt, pain, and function. PLAN: If pt doesn't return, this note can be considered a D/C note. Pt's work schedule may prohibit pt from coming 2x/wk.    [x] Continue per plan of care [] Alter current plan (see comments above)  [] Plan of care initiated [] Hold pending MD visit [] Discharge  Electronically signed by: Wesly Balderrama 89, DPT [id number]

## 2021-09-09 ENCOUNTER — TELEPHONE (OUTPATIENT)
Dept: ORTHOPEDIC SURGERY | Age: 31
End: 2021-09-09

## 2021-09-14 ENCOUNTER — HOSPITAL ENCOUNTER (OUTPATIENT)
Dept: PHYSICAL THERAPY | Age: 31
Setting detail: THERAPIES SERIES
Discharge: HOME | End: 2021-09-14
Payer: COMMERCIAL

## 2021-09-14 PROCEDURE — 97112 NEUROMUSCULAR REEDUCATION: CPT | Performed by: PHYSICAL THERAPIST

## 2021-09-14 PROCEDURE — 97110 THERAPEUTIC EXERCISES: CPT | Performed by: PHYSICAL THERAPIST

## 2021-09-14 PROCEDURE — 97530 THERAPEUTIC ACTIVITIES: CPT | Performed by: PHYSICAL THERAPIST

## 2021-09-14 NOTE — PLAN OF CARE
[facility] Performance [facility] Rehabilitation a Department of [address]ehuda Yoder [address], [address]  Office: [phone]  Fax:  [phone]                                                       Physical Therapy Treatment Note/ Progress Report:      Physical Therapy Re-Certification Plan of Care    Dear Dr. Nerissa Dejesus,    We had the pleasure of treating the following patient for physical therapy services at [address]. A summary of our findings can be found in the updated assessment below. This includes our plan of care. If you have any questions or concerns regarding these findings, please do not hesitate to contact me at the office phone number checked above. Thank you for the referral.     Physician Signature:________________________________Date:__________________  By signing above (or electronic signature), therapists plan is approved by physician    Date Range Of Visits: 8/20/21-9/14/2021 (for this recent episode)  Total Visits to Date: 8 (total)  Overall Response to Treatment:   [] Patient is responding well to treatment and improvement is noted with regards to goals   [] Patient should continue to improve in reasonable time if they continue HEP   [] Patient has plateaued and is no longer responding to skilled PT intervention    [] Patient is getting worse and would benefit from return to referring MD   [] Patient unable to adhere to initial POC   [x] Other: Pt nazraio tx well. She did report some pain with leg press. We did change to ECL. Then it was noted that she was hyperextending with the leg press. Her pain was improved when she avoided this position. We thus went back to leg press SL. I did update her HEP and asked her to work on strengthening (ie machines) 3x/wk. She has made good progress since starting. She has not been able to return to her normal activities and does have some difficulty with steps.   Pt would benefit from skilled PT to improve strength, ROM, gt, pain, and function. Recommend continuing tx 1-2x/wk x 2-4 wks. Date:  2021    Patient Name:  Alexus James    :  1990  MRN: [id number]  Restrictions/Precautions:    Medical/Treatment Diagnosis Information:  Diagnosis: M25.561, G89.29 (ICD-10-CM) - Chronic pain of right knee; s/p right knee fat pad excision. Surgery on 21  Treatment Diagnosis: M25.561, G89.29 (ICD-10-CM) - Chronic pain of right knee; s/p right knee fat pad excision. Surgery on   Insurance/Certification information:  PT Insurance Information: Medical [place]  Physician Information:  Referring Practitioner: Kevin Padilla  Has the plan of care been signed (Y/N):        []  Yes  [x]  No     Date of Patient follow up with Physician: Pt to schedule around 15 Sept 21      Is this a Progress Report:     []  Yes  [x]  No        If Yes:  Date Range for reporting period:  Beginning 20 Aug 21  Ending     Progress report will be due (10 Rx or 30 days whichever is less): visit  or 12 Oct 21       Recertification will be due (POC Duration  / 90 days whichever is less): see above         Visit # Insurance Allowable Auth Required   10 30 []  Yes [x]  No        Functional Scale: LEFS: 63.75%    Date assessed: 2021    Latex Allergy:  [x]NO      []YES  Preferred Language for Healthcare:   [x]English       []other:      Pain level:  1/10    SUBJECTIVE:  She feels more discomfort on the medial knee. She can do stairs easier (including going down). She does find herself limping with them. She feels 90% back to normal going down steps. It is no longer painful to go down steps. Her pain at worst has been after she gets up from sitting for a while (like at work). The pain is then 4/10. She would like to get into a pool. She is to start [employer] in October. OBJECTIVE:      Observation: incision is covered with compression sleeve only. It is healing nicely without S&S of infection.  Test measurements:      Flexibility L R Comment   Hamstring      Gastroc      ITB      Quad                ROM PROM AROM Overpressure Comment    L R L R L R    Flexion   138 sheet pulls       Extension    0                              Strength L R Comment   Quad 4-     Hamstring 4     Gastroc      Hip flexor 4- to 4     Hip ABD 4-                     Special Test Results/Comment   Meniscal Click    Crepitus    Flexion Test    Valgus Laxity    Varus Laxity    Lachmans    Drop Back    Homans            Girth L R   Mid Patella     Suprapatellar     5cm above     15cm above       [place] Clinical Decision Rule for DVT    Clinical Presentation  Possible Score  Clients Score    Active cancer (within 6 months of diagnosis or receiving palliative care)  1     Paralysis, paresis, or recent immobilization of lower extremity  1     Bedridden for more than 3 days or major surgery in the last 4 weeks  1     Localized tenderness in the center of the posterior calf, the popliteal space, or along the femoral vein in the anterior thigh/groin  1     Entire lower extremity swelling  1     Unilateral calf swelling (more than 3 cm larger than uninvolved side)  1     Unilateral pitting edema  1     Collateral superficial veins (nonvaricose)  1     An alternative diagnosis is as likely (or more likely) than DVT (e.g., cellulitis, postoperative swelling, calf strain)  -2     Total Points   -2     Key  · -2 to 0 Low probability of DVT 3% (95% confidence interval [CI] 1.7%-5.9%)  · 1 to 2 Moderate probability of DVT 17% (95% confidence interval [CI] 12%-23%)  · 3 or more High probability of DVT 75% (95% confidence interval [CI] 63%-84%)    Medical consultation is advised in the presence of low probability  Medical referral is required with moderate or high score.       Boo PS, Leodan ABRAHAM, Sabrina J, et al: Value of assessment of pretest probability of deep-vein thrombosis in clinical management, Lancet 556:2057-2393, 1997. RESTRICTIONS/PRECAUTIONS: h/o depression; h/o COVID-19 (in October 2020) without lasting effects; h/o corrective jaw surgery     Exercises/Interventions:     Exercise/Equipment Repetitions/Resistance Other comments   Stretching     Hamstring 5x:30    Hip Flexion     ITB- Rope     Grion     Quad     Inclined Calf 5x:30    Towel Pull     Piriformis                    SLR     Supine 3x10 3#    Prone     Abduction 3x10 3#    Adducton 3x10 3#    SLR+ 3x:15    clams 3x10 6#              Isometrics     Quad sets     Ball Squeezes     Patellar Glides     Medial     Superior     Inferior          ROM     Passive     Active     Weight Shift     Hang Weights     Sheet Pulls 10x:10    Ankle Pumps          CKC     Calf raises 3x10 SL    Wall sits     Step ups and over 3x10 +2 risers    1 leg stand 5x:30 Airex    Squatting 3x10 on rockerboard    CC TKE 3x10 45#    Balance     Monster Walks     Bridging     Triple threats     Biodex 4' RC medium/medium [place]p/speed. L6.         Stool Scoots     PRE     Extension  RANGE:   Flexion 3x10 10#+ wt SLE RANGE: 0/90         Cable Column          Leg Press 3x10 50# SL/ECL RANGE: 70/10        Bike 8' L5    Treadmill          Cone walks                 Access Code: [email or web address]  URL: [email or web address]  Date: 09/14/2021  Prepared by: Jailyn Carey    Exercises  Seated Table Hamstring Stretch - 2 x daily - 7 x weekly - 5 sets - 30 hold  Gastroc Stretch on Wall - 2 x daily - 7 x weekly - 5 sets - 30 hold  Supine Straight Leg Raises - 2 x daily - 7 x weekly - 10 reps - 3 sets  Sidelying Hip Adduction - 2 x daily - 7 x weekly - 3 sets - 10 reps  Sidelying Hip Abduction - 2 x daily - 7 x weekly - 3 sets - 10 reps  Clamshell - 2 x daily - 7 x weekly - 3 sets - 10 reps  Supine Heel Slide with Strap - 2 x daily - 7 x weekly - 10 sets - 10 hold  Single Leg Heel Raise with Unilateral Counter Support - 1-2 x daily - 7 x weekly - 3 sets - 10 reps  Mini Squat - 1 x daily - 3-7 x weekly - 3 sets - 10 reps  Single Leg Stance - 1 x daily - 3-7 x weekly - 3-5 sets - 30 hold  Eccentric Single Leg Press 2 Up, 1 Down - 1 x daily - 3 x weekly - 3 sets - 10 reps  Single Leg Hamstring Curl with Weight Machine - 1 x daily - 3 x weekly - 3 sets - 10 reps  Bridge with Heels on [place] - 1 x daily - 3 x weekly - 3 sets - 10 reps - 1-2 hold  Wall Quarter Squat - 1 x daily - 3 x weekly - 3-5 reps - 30-60 hold  Supine Active Straight Leg Raise - 1 x daily - 3-7 x weekly - 3-5 reps - 15-30 hold          Therapeutic Exercise and NMR EXR  [x](07196) Provided verbal/tactile cueing for activities related to strengthening, flexibility, endurance, ROM for improvements in LE, proximal hip, and core control with self care, mobility, lifting, ambulation. [x] (66872) Provided verbal/tactile cueing for activities related to improving balance, coordination, kinesthetic sense, posture, motor skill, proprioception  to assist with LE, proximal hip, and core control in self care, mobility, lifting, ambulation and eccentric single leg control.      NMR and Therapeutic Activities:    [x] (17009 or 86239) Provided verbal/tactile cueing for activities related to improving balance, coordination, kinesthetic sense, posture, motor skill, proprioception and motor activation to allow for proper function of core, proximal hip and LE with self care and ADLs  [x] (60481) Gait Re-education- Provided training and instruction to the patient for proper LE, core and proximal hip recruitment and positioning and eccentric body weight control with ambulation re-education including up and down stairs     Home Exercise Program:    [x] (05649) Reviewed/Progressed HEP activities related to strengthening, flexibility, endurance, ROM of core, proximal hip and LE for functional self-care, mobility, lifting and ambulation/stair navigation   [x] (03992)Reviewed/Progressed HEP activities related to improving balance, coordination, kinesthetic sense, posture, motor skill, proprioception of core, proximal hip and LE for self care, mobility, lifting, and ambulation/stair navigation      Manual Treatments:  PROM / STM / Oscillations-Mobs:  G-I, II, III, IV (PA's, Inf., Post.)  [x](22242) Provided manual therapy to mobilize LE, proximal hip and/or LS spine soft tissue/joints for the purpose of modulating pain, promoting relaxation,  increasing ROM, reducing/eliminating soft tissue swelling/inflammation/restriction, improving soft tissue extensibility and allowing for proper ROM for normal function with self care, mobility, lifting and ambulation. Other:         Modalities:  15' ice    Charges:   Timed Code Treatment Minutes: 61'   Total Treatment Minutes: 90'     Dry Needling NOT Covered  []EVAL (LOW) 59544   [] EVAL (MOD) 21585   []  EVAL (HIGH) 47643   []  RE-EVAL   [x]  WF(73782) x 2    []IONTO  [x]  NMR (87321) x1     []  VASO  []  Manual (85111) x      [] Other:  [x]  TA x  1    [] Mech Traction (20440)  []  ES(attended) (80335)      []ES (un) (77992):     GOALS:   Patient stated goal: walk without pain and locking; return to sports (snoboarding, wakeboarding and volleyball)    [x]? Progressing: []? Met: []? Not Met: []? Adjusted     Therapist goals for Patient:   Short Term Goals: To be achieved in: 2 weeks  1. Independent in HEP and progression per patient tolerance, in order to prevent re-injury. []? Progressing: [x]? Met: []? Not Met: []? Adjusted   2. Patient will have a decrease in pain of 5/10 at worst to facilitate improvement in movement, function, and ADLs as indicated by functional deficits. []? Progressing: [x]? Met: []? Not Met: []? Adjusted     Long Term Goals: To be achieved in: 8 weeks  1. Pt will demo a LEFS score of 85% or better to assist with reaching prior level of function. [x]? Progressing: []? Met: []? Not Met: []? Adjusted   2.  Patient will demonstrate increased AROM to knee flexion to greater than or equal to 140 and knee ext to 0 to allow for proper joint functioning as indicated by patients functional deficits. [x]? Progressing: []? Met: []? Not Met: []? Adjusted  3. Patient will demonstrate an increase in strength of hip flex, ABD, and knee flex/ext to 4+/5 to allow for proper functional mobility as indicated by patients functional deficits. [x]? Progressing: []? Met: []? Not Met: []? Adjusted  4. Patient will return to amb in the community without increased symptoms or restriction. []? Progressing: [x]? Met: []? Not Met: []? Adjusted  5. Pt will return to full work duty without limitations. [x]? Progressing: []? Met: []? Not Met: []? Adjusted       Overall Progression Towards Functional goals/ Treatment Progress Update:  [x] Patient is progressing as expected towards functional goals listed. [] Progression is slowed due to complexities/Impairments listed. [] Progression has been slowed due to co-morbidities. [] Plan just implemented, too soon to assess goals progression <30days   [] Goals require adjustment due to lack of progress  [] Patient is not progressing as expected and requires additional follow up with physician  [] Other    Prognosis for POC: [x] Good [] Fair  [] Poor      Patient requires continued skilled intervention: [x] Yes  [] No    Treatment/Activity Tolerance:  [x] Patient able to complete treatment  [] Patient limited by fatigue  [] Patient limited by pain     [] Patient limited by other medical complications  [] Other: Pt nazario tx well. She did report some pain with leg press. We did change to ECL. Then it was noted that she was hyperextending with the leg press. Her pain was improved when she avoided this position. We thus went back to leg press SL. I did update her HEP and asked her to work on strengthening (ie machines) 3x/wk. She has made good progress since starting.   She has not been able to return to her normal activities and does have some difficulty with steps. Pt would benefit from skilled PT to improve strength, ROM, gt, pain, and function. Recommend continuing tx 1-2x/wk x 2-4 wks. PLAN: If pt doesn't return, this note can be considered a D/C note. Pt's work schedule may prohibit pt from coming 2x/wk. Pt to see MD when ready to transition to HEP.     [x] Continue per plan of care [] Alter current plan (see comments above)  [] Plan of care initiated [] Hold pending MD visit [] Discharge  Electronically signed by: Rama Jacob, PT PT, DPT [id number]

## 2021-09-24 ENCOUNTER — OFFICE VISIT (OUTPATIENT)
Dept: ORTHOPEDIC SURGERY | Age: 31
End: 2021-09-24

## 2021-09-24 ENCOUNTER — HOSPITAL ENCOUNTER (OUTPATIENT)
Dept: PHYSICAL THERAPY | Age: 31
Setting detail: THERAPIES SERIES
Discharge: HOME | End: 2021-09-24
Payer: COMMERCIAL

## 2021-09-24 VITALS — WEIGHT: 115 LBS | HEIGHT: 68 IN | RESPIRATION RATE: 12 BRPM | BODY MASS INDEX: 17.43 KG/M2

## 2021-09-24 DIAGNOSIS — M65.9 SYNOVITIS OF RIGHT KNEE: Primary | ICD-10-CM

## 2021-09-24 DIAGNOSIS — M67.461 GANGLION OF RIGHT KNEE: ICD-10-CM

## 2021-09-24 PROCEDURE — 97112 NEUROMUSCULAR REEDUCATION: CPT | Performed by: PHYSICAL THERAPIST

## 2021-09-24 PROCEDURE — 99024 POSTOP FOLLOW-UP VISIT: CPT | Performed by: ORTHOPAEDIC SURGERY

## 2021-09-24 PROCEDURE — 97530 THERAPEUTIC ACTIVITIES: CPT | Performed by: PHYSICAL THERAPIST

## 2021-09-24 PROCEDURE — 97110 THERAPEUTIC EXERCISES: CPT | Performed by: PHYSICAL THERAPIST

## 2021-09-24 NOTE — FLOWSHEET NOTE
[address] [facility] a Department of [address]  Trevon De Jesus [address], [address]  Office: [phone]  Fax:  [phone]                                                       Physical Therapy Treatment Note/ Progress Report:             Date:  2021    Patient Name:  Raghu Coto    :  1990  MRN: [id number]  Restrictions/Precautions:    Medical/Treatment Diagnosis Information:  Diagnosis: M25.561, G89.29 (ICD-10-CM) - Chronic pain of right knee; s/p right knee fat pad excision. Surgery on 21  Treatment Diagnosis: M25.561, G89.29 (ICD-10-CM) - Chronic pain of right knee; s/p right knee fat pad excision. Surgery on   Insurance/Certification information:  PT Insurance Information: Medical [place]  Physician Information:  Referring Practitioner: Haydee Sung  Has the plan of care been signed (Y/N):        []  Yes  [x]  No     Date of Patient follow up with Physician: Pt to schedule when ready for D/C      Is this a Progress Report:     []  Yes  [x]  No        If Yes:  Date Range for reporting period:  Beginning 20 Aug 21  Ending     Progress report will be due (10 Rx or 30 days whichever is less): visit  or 12 Oct 21       Recertification will be due (POC Duration  / 90 days whichever is less): see above         Visit # Insurance Allowable Auth Required   11 30 []  Yes [x]  No        Functional Scale: LEFS: 63.75%    Date assessed: 2021    Latex Allergy:  [x]NO      []YES  Preferred Language for Healthcare:   [x]English       []other:      Pain level:  0/10    SUBJECTIVE:  She feels pretty close to normal.  She has been able to return to full duty at work without problems. She has not returned to normal sport. OBJECTIVE:      Observation: incision is covered with compression sleeve only. It is healing nicely without S&S of infection.     Test measurements:      Flexibility L R Comment Hamstring      Gastroc      ITB      Quad                ROM PROM AROM Overpressure Comment    L R L R L R    Flexion   138 sheet pulls       Extension    0                              Strength L R Comment   Quad 4-     Hamstring 4     Gastroc      Hip flexor 4- to 4     Hip ABD 4-                     Special Test Results/Comment   Meniscal Click    Crepitus    Flexion Test    Valgus Laxity    Varus Laxity    Lachmans    Drop Back    Homans            Girth L R   Mid Patella     Suprapatellar     5cm above     15cm above       Boo Clinical Decision Rule for DVT    Clinical Presentation  Possible Score  Clients Score    Active cancer (within 6 months of diagnosis or receiving palliative care)  1     Paralysis, paresis, or recent immobilization of lower extremity  1     Bedridden for more than 3 days or major surgery in the last 4 weeks  1     Localized tenderness in the center of the posterior calf, the popliteal space, or along the femoral vein in the anterior thigh/groin  1     Entire lower extremity swelling  1     Unilateral calf swelling (more than 3 cm larger than uninvolved side)  1     Unilateral pitting edema  1     Collateral superficial veins (nonvaricose)  1     An alternative diagnosis is as likely (or more likely) than DVT (e.g., cellulitis, postoperative swelling, calf strain)  -2     Total Points   -2     Key  · -2 to 0 Low probability of DVT 3% (95% confidence interval [CI] 1.7%-5.9%)  · 1 to 2 Moderate probability of DVT 17% (95% confidence interval [CI] 12%-23%)  · 3 or more High probability of DVT 75% (95% confidence interval [CI] 63%-84%)    Medical consultation is advised in the presence of low probability  Medical referral is required with moderate or high score. Boo PS, Leodan DR, Sabrina J, et al: Value of assessment of pretest probability of deep-vein thrombosis in clinical management, Lancet 933:4071-6125, 1997.       RESTRICTIONS/PRECAUTIONS: h/o depression; h/o COVID-19 (in October 2020) without lasting effects; h/o corrective jaw surgery     Exercises/Interventions:     Exercise/Equipment Repetitions/Resistance Other comments   Stretching     Hamstring 5x:30    Hip Flexion     ITB- Rope     Grion     Quad 5x:30    Inclined Calf 5x:30    Towel Pull     Piriformis                    SLR     Supine 3x10 3#    Prone     Abduction 3x10 3#    Adducton 3x10 3#    SLR+ 3x:15    clams 3x10 6#              Isometrics     Quad sets     Ball Squeezes     Patellar Glides     Medial     Superior     Inferior          ROM     Passive     Active     Weight Shift     Hang Weights     Sheet Pulls     Ankle Pumps          CKC     Calf raises 3x10 SL    Wall sits 5x:25    Step ups and over     1 leg stand 5x:30 Airex    Squatting 3x10 on rockerboard    CC TKE 3x10 50#    Balance     Monster Walks     Bridging     Triple threats     Biodex          Stool Scoots     PRE     Extension  RANGE:   Flexion 3x10 10#+ wt SLE RANGE: 0/90         Cable Column          Leg Press 3x10 50# SL RANGE: 70/10        Bike 8' L6    Treadmill          Cone walks     AlterG NV           Access Code: [email or web address]  URL: [email or web address]  Date: 09/14/2021  Prepared by: Tania Carey    Exercises  Seated Table Hamstring Stretch - 2 x daily - 7 x weekly - 5 sets - 30 hold  Gastroc Stretch on Wall - 2 x daily - 7 x weekly - 5 sets - 30 hold  Supine Straight Leg Raises - 2 x daily - 7 x weekly - 10 reps - 3 sets  Sidelying Hip Adduction - 2 x daily - 7 x weekly - 3 sets - 10 reps  Sidelying Hip Abduction - 2 x daily - 7 x weekly - 3 sets - 10 reps  Clamshell - 2 x daily - 7 x weekly - 3 sets - 10 reps  Supine Heel Slide with Strap - 2 x daily - 7 x weekly - 10 sets - 10 hold  Single Leg Heel Raise with Unilateral Counter Support - 1-2 x daily - 7 x weekly - 3 sets - 10 reps  Mini Squat - 1 x daily - 3-7 x weekly - 3 sets - 10 reps  Single Leg Stance - 1 x daily - 3-7 x weekly - 3-5 sets - 30 hold  Eccentric Single Leg Press 2 Up, 1 Down - 1 x daily - 3 x weekly - 3 sets - 10 reps  Single Leg Hamstring Curl with Weight Machine - 1 x daily - 3 x weekly - 3 sets - 10 reps  Bridge with Heels on [place] - 1 x daily - 3 x weekly - 3 sets - 10 reps - 1-2 hold  Wall Quarter Squat - 1 x daily - 3 x weekly - 3-5 reps - 30-60 hold  Supine Active Straight Leg Raise - 1 x daily - 3-7 x weekly - 3-5 reps - 15-30 hold          Therapeutic Exercise and NMR EXR  [x](72712) Provided verbal/tactile cueing for activities related to strengthening, flexibility, endurance, ROM for improvements in LE, proximal hip, and core control with self care, mobility, lifting, ambulation. [x] (19680) Provided verbal/tactile cueing for activities related to improving balance, coordination, kinesthetic sense, posture, motor skill, proprioception  to assist with LE, proximal hip, and core control in self care, mobility, lifting, ambulation and eccentric single leg control.      NMR and Therapeutic Activities:    [x] (46969 or 34436) Provided verbal/tactile cueing for activities related to improving balance, coordination, kinesthetic sense, posture, motor skill, proprioception and motor activation to allow for proper function of core, proximal hip and LE with self care and ADLs  [x] (20531) Gait Re-education- Provided training and instruction to the patient for proper LE, core and proximal hip recruitment and positioning and eccentric body weight control with ambulation re-education including up and down stairs     Home Exercise Program:    [x] (74144) Reviewed/Progressed HEP activities related to strengthening, flexibility, endurance, ROM of core, proximal hip and LE for functional self-care, mobility, lifting and ambulation/stair navigation   [x] (99404)Reviewed/Progressed HEP activities related to improving balance, coordination, kinesthetic sense, posture, motor skill, proprioception of core, proximal hip and LE for self care, mobility, lifting, and ambulation/stair navigation      Manual Treatments:  PROM / STM / Oscillations-Mobs:  G-I, II, III, IV (PA's, Inf., Post.)  [x](64817) Provided manual therapy to mobilize LE, proximal hip and/or LS spine soft tissue/joints for the purpose of modulating pain, promoting relaxation,  increasing ROM, reducing/eliminating soft tissue swelling/inflammation/restriction, improving soft tissue extensibility and allowing for proper ROM for normal function with self care, mobility, lifting and ambulation. Other:         Modalities:  Declined today due to time restraints. Charges:   Timed Code Treatment Minutes: 37'   Total Treatment Minutes: 65'     Dry Needling NOT Covered  []EVAL (LOW) 97898   [] EVAL (MOD) 01481   []  EVAL (HIGH) 74172   []  RE-EVAL   [x]  JA(69382) x 1    []IONTO  [x]  NMR (09002) x1     []  VASO  []  Manual (99286) x      [] Other:  [x]  TA x  1    [] Mech Traction (45212)  []  ES(attended) (52317)      []ES (un) (02518):     GOALS:   Patient stated goal: walk without pain and locking; return to sports (snoboarding, wakeboarding and volleyball)    [x]? Progressing: []? Met: []? Not Met: []? Adjusted     Therapist goals for Patient:   Short Term Goals: To be achieved in: 2 weeks  1. Independent in HEP and progression per patient tolerance, in order to prevent re-injury. []? Progressing: [x]? Met: []? Not Met: []? Adjusted   2. Patient will have a decrease in pain of 5/10 at worst to facilitate improvement in movement, function, and ADLs as indicated by functional deficits. []? Progressing: [x]? Met: []? Not Met: []? Adjusted     Long Term Goals: To be achieved in: 8 weeks  1. Pt will demo a LEFS score of 85% or better to assist with reaching prior level of function. [x]? Progressing: []? Met: []? Not Met: []? Adjusted   2.  Patient will demonstrate increased AROM to knee flexion to greater than or equal to 140 and knee ext to 0 to allow for proper joint functioning as indicated by patients functional deficits. [x]? Progressing: []? Met: []? Not Met: []? Adjusted  3. Patient will demonstrate an increase in strength of hip flex, ABD, and knee flex/ext to 4+/5 to allow for proper functional mobility as indicated by patients functional deficits. [x]? Progressing: []? Met: []? Not Met: []? Adjusted  4. Patient will return to amb in the community without increased symptoms or restriction. []? Progressing: [x]? Met: []? Not Met: []? Adjusted  5. Pt will return to full work duty without limitations. [x]? Progressing: []? Met: []? Not Met: []? Adjusted       Overall Progression Towards Functional goals/ Treatment Progress Update:  [x] Patient is progressing as expected towards functional goals listed. [] Progression is slowed due to complexities/Impairments listed. [] Progression has been slowed due to co-morbidities. [] Plan just implemented, too soon to assess goals progression <30days   [] Goals require adjustment due to lack of progress  [] Patient is not progressing as expected and requires additional follow up with physician  [] Other    Prognosis for POC: [x] Good [] Fair  [] Poor      Patient requires continued skilled intervention: [x] Yes  [] No    Treatment/Activity Tolerance:  [x] Patient able to complete treatment  [] Patient limited by fatigue  [] Patient limited by pain     [] Patient limited by other medical complications  [] Other: Pt nazario tx well. She was fatigued at the end of tx. She does demo fair endurance. She does demo quad atrophy. We did discuss the importance of continuing strengthening 3x/wk including getting into a gym. She is agreeable to continue formal PT every other week to transition to either Gap or HEP. Pt would benefit from skilled PT to improve strength, ROM, gt, pain, and function. PLAN: If pt doesn't return, this note can be considered a D/C note. Plan to work on running on the [employer].    [x] Continue per plan of care [] Alter current plan (see comments above)  [] Plan of care initiated [] Hold pending MD visit [] Discharge  Electronically signed by: Wesly Hidalgo 89, DPT [id number]

## 2021-09-24 NOTE — PROGRESS NOTES
Soheila Jane returns today status post right knee arthroscopy with excision of lateral fat pad, and decompression of ganglion cyst.  She is doing great and reports no pain. Surgery was September 18, 2021. She is back at work without any difficulty. Today incisions are perfectly healed. Right knee has no effusion or crepitation. She has full range of motion and no instability. Assessment: Doing well status post right knee surgery. Plan: She can resume activities as tolerated. She will continue with her exercise program and follow-up with me on an as-needed basis. Patient's medications, allergies, past medical, surgical, social and family histories were reviewed and updated as appropriate. Relevant review of systems reviewed. This note was created using voice recognition software. It has been proofread, but occasionally errors remain. Please disregard these errors. They will be corrected as they are noted.

## 2021-09-27 ENCOUNTER — HOSPITAL ENCOUNTER (OUTPATIENT)
Dept: PHYSICAL THERAPY | Age: 31
Setting detail: THERAPIES SERIES
Discharge: HOME | End: 2021-09-27
Payer: COMMERCIAL

## 2021-09-27 PROCEDURE — 97530 THERAPEUTIC ACTIVITIES: CPT | Performed by: PHYSICAL THERAPIST

## 2021-09-27 PROCEDURE — 97112 NEUROMUSCULAR REEDUCATION: CPT | Performed by: PHYSICAL THERAPIST

## 2021-09-27 PROCEDURE — 97110 THERAPEUTIC EXERCISES: CPT | Performed by: PHYSICAL THERAPIST

## 2021-09-27 NOTE — FLOWSHEET NOTE
[address] [place] [address]  and Sports Rehabilitation, [address] [facility], [address]  Phone: [phone]   Fax: [phone]                                                          Physical Therapy Treatment Note/ Progress Report:             Date:  2021    Patient Name:  Trung Lala    :  1990  MRN: [id number]  Restrictions/Precautions:    Medical/Treatment Diagnosis Information:  Diagnosis: M25.561, G89.29 (ICD-10-CM) - Chronic pain of right knee; s/p right knee fat pad excision. Surgery on 21  Treatment Diagnosis: M25.561, G89.29 (ICD-10-CM) - Chronic pain of right knee; s/p right knee fat pad excision. Surgery on   Insurance/Certification information:  PT Insurance Information: Medical [place]  Physician Information:  Referring Practitioner: Norma Gallegos  Has the plan of care been signed (Y/N):        []  Yes  [x]  No     Date of Patient follow up with Physician: Pt to schedule when ready for D/C      Is this a Progress Report:     []  Yes  [x]  No        If Yes:  Date Range for reporting period:  Beginning 20 Aug 21  Ending     Progress report will be due (10 Rx or 30 days whichever is less): visit  or 12 Oct 21       Recertification will be due (POC Duration  / 90 days whichever is less): see above         Visit # Insurance Allowable Auth Required    30 []  Yes [x]  No        Functional Scale: LEFS: 63.75%    Date assessed: 2021    Latex Allergy:  [x]NO      []YES  Preferred Language for Healthcare:   [x]English       []other:      Pain level:  0/10    SUBJECTIVE:  She is getting ready to start her SCUBA training this weekend. She is feeling well overall. OBJECTIVE:      Observation: incision is covered with compression sleeve only. It is healing nicely without S&S of infection.     Test measurements:      Flexibility L R Comment   Hamstring      Gastroc      ITB      Quad                ROM PROM AROM Overpressure Comment    L R L R L R    Flexion   138 sheet pulls       Extension    0                              Strength L R Comment   Quad 4-     Hamstring 4     Gastroc      Hip flexor 4- to 4     Hip ABD 4-                     Special Test Results/Comment   Meniscal Click    Crepitus    Flexion Test    Valgus Laxity    Varus Laxity    Lachmans    Drop Back    Homans            Girth L R   Mid Patella     Suprapatellar     5cm above     15cm above       Boo Clinical Decision Rule for DVT    Clinical Presentation  Possible Score  Clients Score    Active cancer (within 6 months of diagnosis or receiving palliative care)  1     Paralysis, paresis, or recent immobilization of lower extremity  1     Bedridden for more than 3 days or major surgery in the last 4 weeks  1     Localized tenderness in the center of the posterior calf, the popliteal space, or along the femoral vein in the anterior thigh/groin  1     Entire lower extremity swelling  1     Unilateral calf swelling (more than 3 cm larger than uninvolved side)  1     Unilateral pitting edema  1     Collateral superficial veins (nonvaricose)  1     An alternative diagnosis is as likely (or more likely) than DVT (e.g., cellulitis, postoperative swelling, calf strain)  -2     Total Points   -2     Key  · -2 to 0 Low probability of DVT 3% (95% confidence interval [CI] 1.7%-5.9%)  · 1 to 2 Moderate probability of DVT 17% (95% confidence interval [CI] 12%-23%)  · 3 or more High probability of DVT 75% (95% confidence interval [CI] 63%-84%)    Medical consultation is advised in the presence of low probability  Medical referral is required with moderate or high score. Boo PS, Leodan DR, Sabrina J, et al: Value of assessment of pretest probability of deep-vein thrombosis in clinical management, Lancet 930:2844-9549, 1997.       RESTRICTIONS/PRECAUTIONS: h/o depression; h/o COVID-19 (in October 2020) without lasting effects; h/o corrective jaw surgery Exercises/Interventions:     Exercise/Equipment Repetitions/Resistance Other comments   Stretching     Hamstring 5x:30    Hip Flexion     ITB- Rope     Grion     Quad 5x:30    Inclined Calf 5x:30    Towel Pull     Piriformis                    SLR     Supine    Prone    Abduction    Adducton    SLR+    clams              Isometrics     Quad sets     Ball Squeezes     Patellar Glides     Medial     Superior     Inferior          ROM     Passive     Active     Weight Shift     Hang Weights     Sheet Pulls     Ankle Pumps          CKC     Calf raises     Wall sits 3x  :25 wall sit  1 lap SL stool scoot    Step ups and over     1 leg stand 5x:30 Airex    Squatting     CC TKE     Balance     Monster Walks     Bridging     Triple threats     Biodex          Stool Scoots     PRE     Extension  RANGE:   Flexion 3x10  25#SLE RANGE: 0/90         Cable Column          Leg Press 3x10 40# SL RANGE: 70/10        Bike     Treadmill          Cone walks     AlterG 3' walk at 70%  1' run/1' walk at 70% x4  :30 run/:30 walk at 80%  1' walk at 90%  1' walk at 100% Small shorts          Access Code: [email or web address]  URL: [email or web address]  Date: 09/14/2021  Prepared by: Heidi Lopez Cessna    Exercises  Seated Table Hamstring Stretch - 2 x daily - 7 x weekly - 5 sets - 30 hold  Gastroc Stretch on Wall - 2 x daily - 7 x weekly - 5 sets - 30 hold  Supine Straight Leg Raises - 2 x daily - 7 x weekly - 10 reps - 3 sets  Sidelying Hip Adduction - 2 x daily - 7 x weekly - 3 sets - 10 reps  Sidelying Hip Abduction - 2 x daily - 7 x weekly - 3 sets - 10 reps  Clamshell - 2 x daily - 7 x weekly - 3 sets - 10 reps  Supine Heel Slide with Strap - 2 x daily - 7 x weekly - 10 sets - 10 hold  Single Leg Heel Raise with Unilateral Counter Support - 1-2 x daily - 7 x weekly - 3 sets - 10 reps  Mini Squat - 1 x daily - 3-7 x weekly - 3 sets - 10 reps  Single Leg Stance - 1 x daily - 3-7 x weekly - 3-5 sets - 30 hold  Eccentric Single Leg Press 2 navigation      Manual Treatments:  PROM / STM / Oscillations-Mobs:  G-I, II, III, IV (PA's, Inf., Post.)  [x](24022) Provided manual therapy to mobilize LE, proximal hip and/or LS spine soft tissue/joints for the purpose of modulating pain, promoting relaxation,  increasing ROM, reducing/eliminating soft tissue swelling/inflammation/restriction, improving soft tissue extensibility and allowing for proper ROM for normal function with self care, mobility, lifting and ambulation. Other:         Modalities:  Declined today due to time restraints. Charges:   Timed Code Treatment Minutes: 40'   Total Treatment Minutes: 54'     Dry Needling NOT Covered  []EVAL (LOW) 91367   [] EVAL (MOD) 54820   []  EVAL (HIGH) 02025   []  RE-EVAL   [x]  MP(11710) x 1    []IONTO  [x]  NMR (74171) x1     []  VASO  []  Manual (53424) x      [] Other:  [x]  TA x  1    [] Mech Traction (59780)  []  ES(attended) (18969)      []ES (un) (22828):     GOALS:   Patient stated goal: walk without pain and locking; return to sports (snoboarding, wakeboarding and volleyball)    [x]? Progressing: []? Met: []? Not Met: []? Adjusted     Therapist goals for Patient:   Short Term Goals: To be achieved in: 2 weeks  1. Independent in HEP and progression per patient tolerance, in order to prevent re-injury. []? Progressing: [x]? Met: []? Not Met: []? Adjusted   2. Patient will have a decrease in pain of 5/10 at worst to facilitate improvement in movement, function, and ADLs as indicated by functional deficits. []? Progressing: [x]? Met: []? Not Met: []? Adjusted     Long Term Goals: To be achieved in: 8 weeks  1. Pt will demo a LEFS score of 85% or better to assist with reaching prior level of function. [x]? Progressing: []? Met: []? Not Met: []? Adjusted   2.  Patient will demonstrate increased AROM to knee flexion to greater than or equal to 140 and knee ext to 0 to allow for proper joint functioning as indicated by patients functional deficits. [x]? Progressing: []? Met: []? Not Met: []? Adjusted  3. Patient will demonstrate an increase in strength of hip flex, ABD, and knee flex/ext to 4+/5 to allow for proper functional mobility as indicated by patients functional deficits. [x]? Progressing: []? Met: []? Not Met: []? Adjusted  4. Patient will return to amb in the community without increased symptoms or restriction. []? Progressing: [x]? Met: []? Not Met: []? Adjusted  5. Pt will return to full work duty without limitations. [x]? Progressing: []? Met: []? Not Met: []? Adjusted       Overall Progression Towards Functional goals/ Treatment Progress Update:  [x] Patient is progressing as expected towards functional goals listed. [] Progression is slowed due to complexities/Impairments listed. [] Progression has been slowed due to co-morbidities. [] Plan just implemented, too soon to assess goals progression <30days   [] Goals require adjustment due to lack of progress  [] Patient is not progressing as expected and requires additional follow up with physician  [] Other    Prognosis for POC: [x] Good [] Fair  [] Poor      Patient requires continued skilled intervention: [x] Yes  [] No    Treatment/Activity Tolerance:  [x] Patient able to complete treatment  [] Patient limited by fatigue  [] Patient limited by pain     [] Patient limited by other medical complications  [] Other: Pt nazario tx well. She nazario the running well without symptoms. We discussed getting on a bike (stationary) or elliptical to work on cardio and prepare for her normal sport activities. I ed her on how to gradually progress this. I also encouraged her to work on her strengthening 3x/wk. She does report having ankle weights at home. Due to adding running, we did modify her routine. She was also seen at a different clinic with different machines. Her weights were adjusted accordingly.    Pt would benefit from skilled PT to improve strength, ROM, gt, pain, and

## 2021-10-15 ENCOUNTER — HOSPITAL ENCOUNTER (OUTPATIENT)
Dept: PHYSICAL THERAPY | Age: 31
Setting detail: THERAPIES SERIES
Discharge: HOME | End: 2021-10-15
Payer: COMMERCIAL

## 2021-10-15 PROCEDURE — 97530 THERAPEUTIC ACTIVITIES: CPT | Performed by: PHYSICAL THERAPIST

## 2021-10-15 PROCEDURE — 97112 NEUROMUSCULAR REEDUCATION: CPT | Performed by: PHYSICAL THERAPIST

## 2021-10-15 PROCEDURE — 97110 THERAPEUTIC EXERCISES: CPT | Performed by: PHYSICAL THERAPIST

## 2021-10-15 NOTE — PLAN OF CARE
[facility] a Department of [address]  Dudley [place] [address], [address]  Office: [phone]  Fax:  [phone]                                                        Physical Therapy Treatment Note/ Progress Report:        Physical Therapy Re-Certification Plan of Care    Dear Dr. Mirna Putnam,    We had the pleasure of treating the following patient for physical therapy services at [address] [place] [address]. A summary of our findings can be found in the updated assessment below. This includes our plan of care. If you have any questions or concerns regarding these findings, please do not hesitate to contact me at the office phone number checked above. Thank you for the referral.     Physician Signature:________________________________Date:__________________  By signing above (or electronic signature), therapists plan is approved by physician    Date Range Of Visits: 3/12/21-10/15/2021  Total Visits to Date: 15  Overall Response to Treatment:   [] Patient is responding well to treatment and improvement is noted with regards to goals   [] Patient should continue to improve in reasonable time if they continue HEP   [] Patient has plateaued and is no longer responding to skilled PT intervention    [] Patient is getting worse and would benefit from return to referring MD   [] Patient unable to adhere to initial POC   [x] Other: Pt nazario tx well. Pt was able to run at 100% BW without pain. She did report feeling out of shape. She is now able to walk in the community without pain, work full duty including pushing/pulling heavy items, and play with her dog running in the yard. She is also participating in [facility] [employer] training. She feels comfortable continuing as HEP. I have strongly recommended continuing HEP strengthening 2-3x/wk including things she could do at home and the gym.  We discussed focusing on glut med, quads and hams.  She is understanding. She can return/call prn. Otherwise, pt is D/C to HEP. Date:  10/15/2021    Patient Name:  Katelyn Savage    :  1990  MRN: [id number]  Restrictions/Precautions:    Medical/Treatment Diagnosis Information:  Diagnosis: M25.561, G89.29 (ICD-10-CM) - Chronic pain of right knee; s/p right knee fat pad excision. Surgery on 21  Treatment Diagnosis: M25.561, G89.29 (ICD-10-CM) - Chronic pain of right knee; s/p right knee fat pad excision. Surgery on   Insurance/Certification information:  PT Insurance Information: [employer]  Physician Information:  Referring Practitioner: Jose Blank  Has the plan of care been signed (Y/N):        []  Yes  [x]  No     Date of Patient follow up with Physician: Pt to schedule when ready for D/C      Is this a Progress Report:     []  Yes  [x]  No        If Yes:  Date Range for reporting period:  Beginning 20 Aug 21  Ending 15 Oct 21     Progress report will be due (10 Rx or 30 days whichever is less): visit 23 or        Recertification will be due (POC Duration  / 90 days whichever is less): see above         Visit # Insurance Allowable Auth Required    []  Yes [x]  No        Functional Scale: LEFS: 93.75%    Date assessed: 10/15/2021    Latex Allergy:  [x]NO      []YES  Preferred Language for Healthcare:   []English       []other:      Pain level:  0/10    SUBJECTIVE:  [employer] training is going well. She has no problems with it with her knee. Overall she is feeling pretty good. She rates herself at 98% improvement/normal.  She has been lifting weights. She has been running around playing with her puppy in the back yard. OBJECTIVE: 15 Oct 21     Observation: incision is healing well.     Test measurements:      Flexibility L R Comment   Hamstring      Gastroc      ITB      Quad                ROM PROM AROM Overpressure Comment    L R L R L R    Flexion    144      Extension    0 Strength L R Comment   Quad 4     Hamstring 4+     Gastroc      Hip flexor 4     Hip ABD 4- to 4                     Special Test Results/Comment   Meniscal Click    Crepitus    Flexion Test    Valgus Laxity    Varus Laxity    Lachmans    Drop Back    Homans            Girth L R   Mid Patella     Suprapatellar     5cm above     15cm above       Boo Clinical Decision Rule for DVT    Clinical Presentation  Possible Score  Clients Score    Active cancer (within 6 months of diagnosis or receiving palliative care)  1     Paralysis, paresis, or recent immobilization of lower extremity  1     Bedridden for more than 3 days or major surgery in the last 4 weeks  1     Localized tenderness in the center of the posterior calf, the popliteal space, or along the femoral vein in the anterior thigh/groin  1     Entire lower extremity swelling  1     Unilateral calf swelling (more than 3 cm larger than uninvolved side)  1     Unilateral pitting edema  1     Collateral superficial veins (nonvaricose)  1     An alternative diagnosis is as likely (or more likely) than DVT (e.g., cellulitis, postoperative swelling, calf strain)  -2     Total Points   -2     Key  · -2 to 0 Low probability of DVT 3% (95% confidence interval [CI] 1.7%-5.9%)  · 1 to 2 Moderate probability of DVT 17% (95% confidence interval [CI] 12%-23%)  · 3 or more High probability of DVT 75% (95% confidence interval [CI] 63%-84%)    Medical consultation is advised in the presence of low probability  Medical referral is required with moderate or high score. Boo PS, Leodan ABRAHAM, Sabrina J, et al: Value of assessment of pretest probability of deep-vein thrombosis in clinical management, Lancet 028:9901-7468, 1997.       RESTRICTIONS/PRECAUTIONS: h/o depression; h/o COVID-19 (in October 2020) without lasting effects; h/o corrective jaw surgery     Exercises/Interventions:     Exercise/Equipment Repetitions/Resistance Other comments   Stretching Hamstring 5x:30    Hip Flexion     ITB- Rope     Grion     Quad 5x:30    Inclined Calf 5x:30    Towel Pull     Piriformis                    SLR     Supine    Prone    Abduction    Adducton    SLR+    clams              Isometrics     Quad sets     Ball Squeezes     Patellar Glides     Medial     Superior     Inferior          ROM     Passive     Active     Weight Shift     Hang Weights     Sheet Pulls     Ankle Pumps          CKC     Calf raises     Wall sits     Step ups and over     1 leg stand 5x:30 Airex    Squatting     CC TKE     Balance     Monster Walks 3x 1/2 length clinic red band L/R    Bridging     Triple threats     Biodex          Stool Scoots     PRE     Extension  RANGE:   Flexion 3x10 25#SLE RANGE: 0/90         Cable Column          Leg Press 3x10 50# SL RANGE: 70/10        Bike     Treadmill          Cone walks     AlterG 3' walk at 80%  1' run/1' walk at 80% x4  1' run/1' walk walk at 90% x3  1' run/1' walk at 100% x2  2' walk at 100% Small shorts          Access Code: [email or web address]  URL: [email or web address]  Date: 09/14/2021  Prepared by: Melisa Carey    Exercises  Seated Table Hamstring Stretch - 2 x daily - 7 x weekly - 5 sets - 30 hold  Gastroc Stretch on Wall - 2 x daily - 7 x weekly - 5 sets - 30 hold  Supine Straight Leg Raises - 2 x daily - 7 x weekly - 10 reps - 3 sets  Sidelying Hip Adduction - 2 x daily - 7 x weekly - 3 sets - 10 reps  Sidelying Hip Abduction - 2 x daily - 7 x weekly - 3 sets - 10 reps  Clamshell - 2 x daily - 7 x weekly - 3 sets - 10 reps  Supine Heel Slide with Strap - 2 x daily - 7 x weekly - 10 sets - 10 hold  Single Leg Heel Raise with Unilateral Counter Support - 1-2 x daily - 7 x weekly - 3 sets - 10 reps  Mini Squat - 1 x daily - 3-7 x weekly - 3 sets - 10 reps  Single Leg Stance - 1 x daily - 3-7 x weekly - 3-5 sets - 30 hold  Eccentric Single Leg Press 2 Up, 1 Down - 1 x daily - 3 x weekly - 3 sets - 10 reps  Single Leg Hamstring Curl with Weight Machine - 1 x daily - 3 x weekly - 3 sets - 10 reps  Bridge with Heels on [place] - 1 x daily - 3 x weekly - 3 sets - 10 reps - 1-2 hold  Wall Quarter Squat - 1 x daily - 3 x weekly - 3-5 reps - 30-60 hold  Supine Active Straight Leg Raise - 1 x daily - 3-7 x weekly - 3-5 reps - 15-30 hold          Therapeutic Exercise and NMR EXR  [x](54068) Provided verbal/tactile cueing for activities related to strengthening, flexibility, endurance, ROM for improvements in LE, proximal hip, and core control with self care, mobility, lifting, ambulation. [x] (80659) Provided verbal/tactile cueing for activities related to improving balance, coordination, kinesthetic sense, posture, motor skill, proprioception  to assist with LE, proximal hip, and core control in self care, mobility, lifting, ambulation and eccentric single leg control.      NMR and Therapeutic Activities:    [x] (56136 or 17718) Provided verbal/tactile cueing for activities related to improving balance, coordination, kinesthetic sense, posture, motor skill, proprioception and motor activation to allow for proper function of core, proximal hip and LE with self care and ADLs  [x] (89737) Gait Re-education- Provided training and instruction to the patient for proper LE, core and proximal hip recruitment and positioning and eccentric body weight control with ambulation re-education including up and down stairs     Home Exercise Program:    [x] (05054) Reviewed/Progressed HEP activities related to strengthening, flexibility, endurance, ROM of core, proximal hip and LE for functional self-care, mobility, lifting and ambulation/stair navigation   [x] (09975)Reviewed/Progressed HEP activities related to improving balance, coordination, kinesthetic sense, posture, motor skill, proprioception of core, proximal hip and LE for self care, mobility, lifting, and ambulation/stair navigation      Manual Treatments:  PROM / STM / Oscillations-Mobs:  G-I, II, III, IV (PA's, Inf., Post.)  [x](28869) Provided manual therapy to mobilize LE, proximal hip and/or LS spine soft tissue/joints for the purpose of modulating pain, promoting relaxation,  increasing ROM, reducing/eliminating soft tissue swelling/inflammation/restriction, improving soft tissue extensibility and allowing for proper ROM for normal function with self care, mobility, lifting and ambulation. Other:         Modalities:  Declined today due to time restraints. Charges:   Timed Code Treatment Minutes: 48'   Total Treatment Minutes: 60'     Dry Needling NOT Covered  []EVAL (LOW) 62183   [] EVAL (MOD) 41822   []  EVAL (HIGH) 11981   []  RE-EVAL   [x]  NP(05815) x 1    []IONTO  [x]  NMR (48611) x1     []  VASO  []  Manual (73454) x      [] Other:  [x]  TA x  2    [] Mech Traction (41009)  []  ES(attended) (86930)      []ES (un) (84487):     GOALS:   Patient stated goal: walk without pain and locking; return to sports (snowboarding, wakeboarding and volleyball)    []? Progressing: [x]? Met: []? Not Met: []? Adjusted     Therapist goals for Patient:   Short Term Goals: To be achieved in: 2 weeks  1. Independent in HEP and progression per patient tolerance, in order to prevent re-injury. []? Progressing: [x]? Met: []? Not Met: []? Adjusted   2. Patient will have a decrease in pain of 5/10 at worst to facilitate improvement in movement, function, and ADLs as indicated by functional deficits. []? Progressing: [x]? Met: []? Not Met: []? Adjusted     Long Term Goals: To be achieved in: 8 weeks  1. Pt will demo a LEFS score of 85% or better to assist with reaching prior level of function. []? Progressing: [x]? Met: []? Not Met: []? Adjusted    2. Patient will demonstrate increased AROM to knee flexion to greater than or equal to 140 and knee ext to 0 to allow for proper joint functioning as indicated by patients functional deficits. []? Progressing: [x]? Met: []? Not Met: []? Adjusted  3.  Patient will demonstrate an increase in strength of hip flex, ABD, and knee flex/ext to 4+/5 to allow for proper functional mobility as indicated by patients functional deficits. [x]? Progressing: []? Met: []? Not Met: []? Adjusted  4. Patient will return to amb in the community without increased symptoms or restriction. []? Progressing: [x]? Met: []? Not Met: []? Adjusted  5. Pt will return to full work duty without limitations. []? Progressing: [x]? Met: []? Not Met: []? Adjusted       Overall Progression Towards Functional goals/ Treatment Progress Update:  [x] Patient is progressing as expected towards functional goals listed. [] Progression is slowed due to complexities/Impairments listed. [] Progression has been slowed due to co-morbidities. [] Plan just implemented, too soon to assess goals progression <30days   [] Goals require adjustment due to lack of progress  [] Patient is not progressing as expected and requires additional follow up with physician  [] Other    Prognosis for POC: [x] Good [] Fair  [] Poor      Patient requires continued skilled intervention: [x] Yes  [] No    Treatment/Activity Tolerance:  [x] Patient able to complete treatment  [] Patient limited by fatigue  [] Patient limited by pain     [] Patient limited by other medical complications  [] Other: Pt nazario tx well. Pt was able to run at 100% BW without pain. She did report feeling out of shape. She is now able to walk in the community without pain, work full duty including pushing/pulling heavy items, and play with her dog running in the yard. She is also participating in [employer] training. She feels comfortable continuing as HEP. I have strongly recommended continuing HEP strengthening 2-3x/wk including things she could do at home and the gym. We discussed focusing on glut med, quads and hams. She is understanding. She can return/call prn. Otherwise, pt is D/C to HEP. PLAN: She can return/call prn. Otherwise, pt is D/C to HEP.     [x]

## 2021-10-26 ENCOUNTER — APPOINTMENT (OUTPATIENT)
Dept: PHYSICAL THERAPY | Age: 31
End: 2021-10-26
Payer: COMMERCIAL

## 2022-06-29 LAB
CHOLESTEROL, TOTAL: 179 MG/DL (ref 0–199)
GLUCOSE BLD-MCNC: 82 MG/DL (ref 70–99)
HDLC SERPL-MCNC: 81 MG/DL (ref 40–60)
LDL CHOLESTEROL CALCULATED: 89 MG/DL
TRIGL SERPL-MCNC: 46 MG/DL (ref 0–150)

## 2022-09-22 ENCOUNTER — OFFICE VISIT (OUTPATIENT)
Dept: ORTHOPEDIC SURGERY | Age: 32
End: 2022-09-22
Payer: COMMERCIAL

## 2022-09-22 VITALS — WEIGHT: 115 LBS | HEIGHT: 68 IN | BODY MASS INDEX: 17.43 KG/M2

## 2022-09-22 DIAGNOSIS — S80.01XA CONTUSION OF RIGHT KNEE, INITIAL ENCOUNTER: ICD-10-CM

## 2022-09-22 DIAGNOSIS — M25.561 ACUTE PAIN OF RIGHT KNEE: Primary | ICD-10-CM

## 2022-09-22 PROCEDURE — 99213 OFFICE O/P EST LOW 20 MIN: CPT | Performed by: ORTHOPAEDIC SURGERY

## 2022-09-22 RX ORDER — METHYLPREDNISOLONE 4 MG/1
TABLET ORAL
Qty: 1 KIT | Refills: 0 | Status: SHIPPED | OUTPATIENT
Start: 2022-09-22

## 2022-09-22 RX ORDER — IBUPROFEN 200 MG
200 TABLET ORAL EVERY 6 HOURS PRN
COMMUNITY

## 2022-09-22 NOTE — PROGRESS NOTES
Nelson Oropeza is seen again today for her right knee. She underwent arthroscopy with removal of fat pad and decompression of a small ganglion cyst in August 2021. She had no symptoms at all until she fell on her knee playing sand volleyball 4 weeks ago. She now complains of pain that is intermittent but can be as bad as 7 out of 10 in the infrapatellar region. She notes that it swells. She has been wearing her brace. She tried Aleve without improvement. History: Patient's relevant past family, medical, and social history are reviewed as part of today's visit. ROS of pertinent positives and negatives as above; otherwise negative. General Exam:    Vitals: Height 5' 8\" (1.727 m), weight 115 lb (52.2 kg). Constitutional: Patient is adequately groomed with no evidence of malnutrition  Mental Status: The patient is oriented to time, place and person. The patient's mood and affect are appropriate. Gait:  Patient walks with normal gait and station. Lymphatic: The lymphatic examination bilaterally reveals all areas to be without enlargement or induration. Vascular: Examination reveals no swelling or calf tenderness. Peripheral pulses are palpable and 2+. Neurological: The patient has good coordination. There is no weakness or sensory deficit. Skin:    Head/Neck: inspection reveals no rashes, ulcerations or lesions. Trunk:  inspection reveals no rashes, ulcerations or lesions. Right Lower Extremity: inspection reveals no rashes, ulcerations or lesions. Left Lower Extremity: inspection reveals no rashes, ulcerations or lesions. Right knee today has no effusion. She has some tenderness over the patella tendon. She has no medial or lateral joint line pain. Calf is soft. She has full motion and she is stable. 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 I was able to compare these to her previous scan.   On the sagittal slices it is clear that her fat pad is still decompressed and there has been no recurrent injury that I can determine. She understands that this is not a formal interpretation and that her scans are incomplete. Assessment: Right knee contusion. Plan: We are going to try a Medrol Dosepak. Follow-up with me in 3 weeks if she is having ongoing symptoms. She is concerned about a scuba diving trip in February.   She agrees with this plan

## 2023-07-14 ENCOUNTER — HOSPITAL ENCOUNTER (OUTPATIENT)
Dept: MRI IMAGING | Age: 33
Discharge: HOME | End: 2023-07-14
Payer: COMMERCIAL

## 2023-07-14 DIAGNOSIS — R68.84 JAW PAIN: ICD-10-CM

## 2023-07-14 PROCEDURE — 70336 MAGNETIC IMAGE JAW JOINT: CPT

## 2023-08-16 SDOH — HEALTH STABILITY: PHYSICAL HEALTH
ON AVERAGE, HOW MANY DAYS PER WEEK DO YOU ENGAGE IN MODERATE TO STRENUOUS EXERCISE (LIKE A BRISK WALK)?: PATIENT DECLINED

## 2023-08-16 SDOH — HEALTH STABILITY: PHYSICAL HEALTH: ON AVERAGE, HOW MANY MINUTES DO YOU ENGAGE IN EXERCISE AT THIS LEVEL?: PATIENT DECLINED

## 2023-08-17 ENCOUNTER — OFFICE VISIT (OUTPATIENT)
Dept: PRIMARY CARE CLINIC | Age: 33
End: 2023-08-17
Payer: COMMERCIAL

## 2023-08-17 VITALS
HEIGHT: 68 IN | WEIGHT: 112.4 LBS | HEART RATE: 64 BPM | OXYGEN SATURATION: 99 % | SYSTOLIC BLOOD PRESSURE: 94 MMHG | TEMPERATURE: 98.9 F | BODY MASS INDEX: 17.03 KG/M2 | DIASTOLIC BLOOD PRESSURE: 59 MMHG

## 2023-08-17 DIAGNOSIS — M26.602 LEFT TEMPOROMANDIBULAR JOINT DISORDER, UNSPECIFIED: ICD-10-CM

## 2023-08-17 DIAGNOSIS — M46.1 SACROILIITIS (HCC): Primary | ICD-10-CM

## 2023-08-17 PROCEDURE — 99203 OFFICE O/P NEW LOW 30 MIN: CPT | Performed by: FAMILY MEDICINE

## 2023-08-17 SDOH — ECONOMIC STABILITY: HOUSING INSECURITY
IN THE LAST 12 MONTHS, WAS THERE A TIME WHEN YOU DID NOT HAVE A STEADY PLACE TO SLEEP OR SLEPT IN A SHELTER (INCLUDING NOW)?: NO

## 2023-08-17 SDOH — ECONOMIC STABILITY: FOOD INSECURITY: WITHIN THE PAST 12 MONTHS, THE FOOD YOU BOUGHT JUST DIDN'T LAST AND YOU DIDN'T HAVE MONEY TO GET MORE.: NEVER TRUE

## 2023-08-17 SDOH — ECONOMIC STABILITY: INCOME INSECURITY: HOW HARD IS IT FOR YOU TO PAY FOR THE VERY BASICS LIKE FOOD, HOUSING, MEDICAL CARE, AND HEATING?: NOT HARD AT ALL

## 2023-08-17 SDOH — ECONOMIC STABILITY: FOOD INSECURITY: WITHIN THE PAST 12 MONTHS, YOU WORRIED THAT YOUR FOOD WOULD RUN OUT BEFORE YOU GOT MONEY TO BUY MORE.: NEVER TRUE

## 2023-08-17 ASSESSMENT — PATIENT HEALTH QUESTIONNAIRE - PHQ9
1. LITTLE INTEREST OR PLEASURE IN DOING THINGS: 0
SUM OF ALL RESPONSES TO PHQ QUESTIONS 1-9: 0
SUM OF ALL RESPONSES TO PHQ QUESTIONS 1-9: 0
2. FEELING DOWN, DEPRESSED OR HOPELESS: 0
SUM OF ALL RESPONSES TO PHQ9 QUESTIONS 1 & 2: 0
SUM OF ALL RESPONSES TO PHQ QUESTIONS 1-9: 0
SUM OF ALL RESPONSES TO PHQ QUESTIONS 1-9: 0

## 2023-08-17 NOTE — PROGRESS NOTES
[facility]. PRIMARY CARE  [address]  [facility] [address]  Dept: [phone]  Dept Fax: [phone]     8/17/2023      nancycrystal Izaiah   1990     Chief Complaint   Patient presents with    Establish Care       HPI    Pt comes in today for new patient. Patient is a  at [employer] ~ 10 years ago had a compression fracture of L2. Notes ~ 1 month ago at work she was bending over to clean the MRI table and felt a burning/painful sensation that radiated down her right leg briefly. Since then has been dealing with a knot in her right lower back area along the muscles. Worse with activity. No longer having any radiating pain. Pain is dull and mild. Intermittent. Has arthrocentesis for TMJ coming up next month. Following with [facility]. Still very painful on the left side. Was recommended to take ibuprofen or aleve. No data recorded       Prior to Visit Medications    Medication Sig Taking? Authorizing Provider   ibuprofen (ADVIL;MOTRIN) 200 MG tablet Take 200 mg by mouth every 6 hours as needed  Historical Provider, MD   methylPREDNISolone (MEDROL, AMANDEEP,) 4 MG tablet Take by mouth. Bryce Naylor MD   omeprazole (PRILOSEC) 20 MG delayed release capsule Take 1 capsule by mouth every morning (before breakfast)  Patient taking differently: Take 20 mg by mouth daily as needed   Mari Mccrary MD        Past Medical History:   Diagnosis Date    TMJ (temporomandibular joint disorder)         Social History     Tobacco Use    Smoking status: Never    Smokeless tobacco: Never   Vaping Use    Vaping Use: Former    Start date: 1/1/2011    Quit date: 8/1/2020    Devices: No   Substance Use Topics    Alcohol use:  Yes     Alcohol/week: 1.0 standard drink     Types: 1 Glasses of wine per week     Comment: Rare    Drug use: Never        Past Surgical History:   Procedure Laterality Date    KNEE ARTHROSCOPY Right 8/18/2021    RIGHT KNEE ARTHROSCOPY

## 2023-08-22 ASSESSMENT — ENCOUNTER SYMPTOMS: BACK PAIN: 1

## 2023-12-28 ENCOUNTER — PATIENT MESSAGE (OUTPATIENT)
Dept: PRIMARY CARE CLINIC | Age: 33
End: 2023-12-28

## 2023-12-28 DIAGNOSIS — R05.2 SUBACUTE COUGH: Primary | ICD-10-CM

## 2023-12-29 ENCOUNTER — HOSPITAL ENCOUNTER (OUTPATIENT)
Dept: GENERAL RADIOLOGY | Age: 33
Discharge: HOME | End: 2023-12-29
Payer: COMMERCIAL

## 2023-12-29 ENCOUNTER — HOSPITAL ENCOUNTER (OUTPATIENT)
Age: 33
Discharge: HOME | End: 2023-12-29
Payer: COMMERCIAL

## 2023-12-29 DIAGNOSIS — R05.2 SUBACUTE COUGH: ICD-10-CM

## 2023-12-29 PROCEDURE — 71046 X-RAY EXAM CHEST 2 VIEWS: CPT

## 2024-01-03 RX ORDER — PREDNISONE 20 MG/1
TABLET ORAL
Qty: 12 TABLET | Refills: 0 | Status: SHIPPED | OUTPATIENT
Start: 2024-01-03

## 2024-01-16 ENCOUNTER — HOSPITAL ENCOUNTER (INPATIENT)
Age: 34
LOS: 7 days | Discharge: HOME | DRG: 872 | End: 2024-01-23
Attending: STUDENT IN AN ORGANIZED HEALTH CARE EDUCATION/TRAINING PROGRAM | Admitting: STUDENT IN AN ORGANIZED HEALTH CARE EDUCATION/TRAINING PROGRAM
Payer: COMMERCIAL

## 2024-01-16 ENCOUNTER — APPOINTMENT (OUTPATIENT)
Dept: ULTRASOUND IMAGING | Age: 34
DRG: 872 | End: 2024-01-16
Payer: COMMERCIAL

## 2024-01-16 ENCOUNTER — APPOINTMENT (OUTPATIENT)
Dept: CT IMAGING | Age: 34
DRG: 872 | End: 2024-01-16
Payer: COMMERCIAL

## 2024-01-16 DIAGNOSIS — N39.0 URINARY TRACT INFECTION WITHOUT HEMATURIA, SITE UNSPECIFIED: ICD-10-CM

## 2024-01-16 DIAGNOSIS — A41.9 SEPTICEMIA (HCC): Primary | ICD-10-CM

## 2024-01-16 DIAGNOSIS — R10.9 ABDOMINAL PAIN, UNSPECIFIED ABDOMINAL LOCATION: ICD-10-CM

## 2024-01-16 DIAGNOSIS — K52.9 ENTEROCOLITIS: ICD-10-CM

## 2024-01-16 LAB
ALBUMIN SERPL-MCNC: 4.3 G/DL (ref 3.4–5)
ALBUMIN/GLOB SERPL: 1.6 {RATIO} (ref 1.1–2.2)
ALP SERPL-CCNC: 51 U/L (ref 40–129)
ALT SERPL-CCNC: 17 U/L (ref 10–40)
ANION GAP SERPL CALCULATED.3IONS-SCNC: 14 MMOL/L (ref 3–16)
AST SERPL-CCNC: 22 U/L (ref 15–37)
BASOPHILS # BLD: 0 K/UL (ref 0–0.2)
BASOPHILS NFR BLD: 0.1 %
BILIRUB SERPL-MCNC: 0.8 MG/DL (ref 0–1)
BILIRUB UR QL STRIP.AUTO: ABNORMAL
BUN SERPL-MCNC: 8 MG/DL (ref 7–20)
C DIFF TOX A+B STL QL IA: NORMAL
CALCIUM SERPL-MCNC: 8.1 MG/DL (ref 8.3–10.6)
CHARACTER UR: ABNORMAL
CHLORIDE SERPL-SCNC: 101 MMOL/L (ref 99–110)
CLARITY UR: ABNORMAL
CO2 SERPL-SCNC: 19 MMOL/L (ref 21–32)
COLOR UR: ABNORMAL
CREAT SERPL-MCNC: 0.5 MG/DL (ref 0.6–1.1)
DEPRECATED RDW RBC AUTO: 13.5 % (ref 12.4–15.4)
EOSINOPHIL # BLD: 0.1 K/UL (ref 0–0.6)
EOSINOPHIL NFR BLD: 0.7 %
EPI CELLS #/AREA URNS HPF: ABNORMAL /HPF (ref 0–5)
FLUAV RNA UPPER RESP QL NAA+PROBE: NEGATIVE
FLUBV AG NPH QL: NEGATIVE
GFR SERPLBLD CREATININE-BSD FMLA CKD-EPI: >60 ML/MIN/{1.73_M2}
GLUCOSE SERPL-MCNC: 99 MG/DL (ref 70–99)
GLUCOSE UR STRIP.AUTO-MCNC: NEGATIVE MG/DL
HAV IGM SERPL QL IA: NORMAL
HBV CORE IGM SERPL QL IA: NORMAL
HBV SURFACE AG SERPL QL IA: NORMAL
HCG UR QL: NEGATIVE
HCT VFR BLD AUTO: 47 % (ref 36–48)
HCV AB SERPL QL IA: NORMAL
HGB BLD-MCNC: 15.3 G/DL (ref 12–16)
HGB UR QL STRIP.AUTO: ABNORMAL
KETONES UR STRIP.AUTO-MCNC: NEGATIVE MG/DL
LACTATE BLDV-SCNC: 3 MMOL/L (ref 0.4–1.9)
LACTATE BLDV-SCNC: 4 MMOL/L (ref 0.4–1.9)
LEUKOCYTE ESTERASE UR QL STRIP.AUTO: ABNORMAL
LIPASE SERPL-CCNC: 21 U/L (ref 13–60)
LYMPHOCYTES # BLD: 0.5 K/UL (ref 1–5.1)
LYMPHOCYTES NFR BLD: 3 %
MAGNESIUM SERPL-MCNC: 1.7 MG/DL (ref 1.8–2.4)
MCH RBC QN AUTO: 29.6 PG (ref 26–34)
MCHC RBC AUTO-ENTMCNC: 32.6 G/DL (ref 31–36)
MCV RBC AUTO: 90.7 FL (ref 80–100)
MONOCYTES # BLD: 0.5 K/UL (ref 0–1.3)
MONOCYTES NFR BLD: 2.8 %
NEUTROPHILS # BLD: 15.1 K/UL (ref 1.7–7.7)
NEUTROPHILS NFR BLD: 93.4 %
NITRITE UR QL STRIP.AUTO: POSITIVE
PH UR STRIP.AUTO: 7.5 [PH] (ref 5–8)
PLATELET # BLD AUTO: 190 K/UL (ref 135–450)
PMV BLD AUTO: 8.4 FL (ref 5–10.5)
POTASSIUM SERPL-SCNC: 3.4 MMOL/L (ref 3.5–5.1)
PROT SERPL-MCNC: 7 G/DL (ref 6.4–8.2)
PROT UR STRIP.AUTO-MCNC: 300 MG/DL
RBC # BLD AUTO: 5.19 M/UL (ref 4–5.2)
RBC #/AREA URNS HPF: ABNORMAL /HPF (ref 0–4)
SODIUM SERPL-SCNC: 134 MMOL/L (ref 136–145)
SP GR UR STRIP.AUTO: 1.02 (ref 1–1.03)
UA COMPLETE W REFLEX CULTURE PNL UR: YES
UA DIPSTICK W REFLEX MICRO PNL UR: YES
URN SPEC COLLECT METH UR: ABNORMAL
UROBILINOGEN UR STRIP-ACNC: 0.2 E.U./DL
WBC # BLD AUTO: 16.2 K/UL (ref 4–11)
WBC #/AREA URNS HPF: >100 /HPF (ref 0–5)
WBC CASTS: ABNORMAL

## 2024-01-16 PROCEDURE — 6360000002 HC RX W HCPCS: Performed by: NURSE PRACTITIONER

## 2024-01-16 PROCEDURE — 87804 INFLUENZA ASSAY W/OPTIC: CPT

## 2024-01-16 PROCEDURE — 6360000002 HC RX W HCPCS: Performed by: STUDENT IN AN ORGANIZED HEALTH CARE EDUCATION/TRAINING PROGRAM

## 2024-01-16 PROCEDURE — 6370000000 HC RX 637 (ALT 250 FOR IP): Performed by: STUDENT IN AN ORGANIZED HEALTH CARE EDUCATION/TRAINING PROGRAM

## 2024-01-16 PROCEDURE — 87040 BLOOD CULTURE FOR BACTERIA: CPT

## 2024-01-16 PROCEDURE — 83690 ASSAY OF LIPASE: CPT

## 2024-01-16 PROCEDURE — 76705 ECHO EXAM OF ABDOMEN: CPT

## 2024-01-16 PROCEDURE — 87086 URINE CULTURE/COLONY COUNT: CPT

## 2024-01-16 PROCEDURE — 2580000003 HC RX 258: Performed by: NURSE PRACTITIONER

## 2024-01-16 PROCEDURE — 36415 COLL VENOUS BLD VENIPUNCTURE: CPT

## 2024-01-16 PROCEDURE — 83735 ASSAY OF MAGNESIUM: CPT

## 2024-01-16 PROCEDURE — 74177 CT ABD & PELVIS W/CONTRAST: CPT

## 2024-01-16 PROCEDURE — 6360000004 HC RX CONTRAST MEDICATION: Performed by: NURSE PRACTITIONER

## 2024-01-16 PROCEDURE — 96367 TX/PROPH/DG ADDL SEQ IV INF: CPT

## 2024-01-16 PROCEDURE — 2580000003 HC RX 258: Performed by: STUDENT IN AN ORGANIZED HEALTH CARE EDUCATION/TRAINING PROGRAM

## 2024-01-16 PROCEDURE — 85025 COMPLETE CBC W/AUTO DIFF WBC: CPT

## 2024-01-16 PROCEDURE — 96365 THER/PROPH/DIAG IV INF INIT: CPT

## 2024-01-16 PROCEDURE — 80053 COMPREHEN METABOLIC PANEL: CPT

## 2024-01-16 PROCEDURE — 83605 ASSAY OF LACTIC ACID: CPT

## 2024-01-16 PROCEDURE — 84703 CHORIONIC GONADOTROPIN ASSAY: CPT

## 2024-01-16 PROCEDURE — 1200000000 HC SEMI PRIVATE

## 2024-01-16 PROCEDURE — 96368 THER/DIAG CONCURRENT INF: CPT

## 2024-01-16 PROCEDURE — 99285 EMERGENCY DEPT VISIT HI MDM: CPT

## 2024-01-16 PROCEDURE — 87506 IADNA-DNA/RNA PROBE TQ 6-11: CPT

## 2024-01-16 PROCEDURE — 96361 HYDRATE IV INFUSION ADD-ON: CPT

## 2024-01-16 PROCEDURE — 81001 URINALYSIS AUTO W/SCOPE: CPT

## 2024-01-16 PROCEDURE — 87449 NOS EACH ORGANISM AG IA: CPT

## 2024-01-16 PROCEDURE — 80074 ACUTE HEPATITIS PANEL: CPT

## 2024-01-16 PROCEDURE — 96375 TX/PRO/DX INJ NEW DRUG ADDON: CPT

## 2024-01-16 PROCEDURE — 87324 CLOSTRIDIUM AG IA: CPT

## 2024-01-16 RX ORDER — SODIUM CHLORIDE, SODIUM LACTATE, POTASSIUM CHLORIDE, CALCIUM CHLORIDE 600; 310; 30; 20 MG/100ML; MG/100ML; MG/100ML; MG/100ML
INJECTION, SOLUTION INTRAVENOUS CONTINUOUS
Status: ACTIVE | OUTPATIENT
Start: 2024-01-16 | End: 2024-01-18

## 2024-01-16 RX ORDER — ONDANSETRON 4 MG/1
4 TABLET, ORALLY DISINTEGRATING ORAL EVERY 8 HOURS PRN
Status: DISCONTINUED | OUTPATIENT
Start: 2024-01-16 | End: 2024-01-23 | Stop reason: HOSPADM

## 2024-01-16 RX ORDER — ONDANSETRON 2 MG/ML
4 INJECTION INTRAMUSCULAR; INTRAVENOUS ONCE
Status: COMPLETED | OUTPATIENT
Start: 2024-01-16 | End: 2024-01-16

## 2024-01-16 RX ORDER — ENOXAPARIN SODIUM 100 MG/ML
40 INJECTION SUBCUTANEOUS NIGHTLY
Status: DISCONTINUED | OUTPATIENT
Start: 2024-01-16 | End: 2024-01-23 | Stop reason: HOSPADM

## 2024-01-16 RX ORDER — ACETAMINOPHEN 650 MG/1
650 SUPPOSITORY RECTAL EVERY 6 HOURS PRN
Status: DISCONTINUED | OUTPATIENT
Start: 2024-01-16 | End: 2024-01-21

## 2024-01-16 RX ORDER — ONDANSETRON 2 MG/ML
4 INJECTION INTRAMUSCULAR; INTRAVENOUS EVERY 6 HOURS PRN
Status: DISCONTINUED | OUTPATIENT
Start: 2024-01-16 | End: 2024-01-23 | Stop reason: HOSPADM

## 2024-01-16 RX ORDER — POTASSIUM CHLORIDE 20 MEQ/1
40 TABLET, EXTENDED RELEASE ORAL PRN
Status: DISCONTINUED | OUTPATIENT
Start: 2024-01-16 | End: 2024-01-23 | Stop reason: HOSPADM

## 2024-01-16 RX ORDER — SODIUM CHLORIDE 0.9 % (FLUSH) 0.9 %
5-40 SYRINGE (ML) INJECTION PRN
Status: DISCONTINUED | OUTPATIENT
Start: 2024-01-16 | End: 2024-01-23 | Stop reason: HOSPADM

## 2024-01-16 RX ORDER — MAGNESIUM SULFATE 1 G/100ML
1000 INJECTION INTRAVENOUS ONCE
Status: COMPLETED | OUTPATIENT
Start: 2024-01-16 | End: 2024-01-16

## 2024-01-16 RX ORDER — ACETAMINOPHEN 325 MG/1
650 TABLET ORAL EVERY 6 HOURS PRN
Status: DISCONTINUED | OUTPATIENT
Start: 2024-01-16 | End: 2024-01-21

## 2024-01-16 RX ORDER — MORPHINE SULFATE 2 MG/ML
2 INJECTION, SOLUTION INTRAMUSCULAR; INTRAVENOUS ONCE
Status: COMPLETED | OUTPATIENT
Start: 2024-01-16 | End: 2024-01-16

## 2024-01-16 RX ORDER — SODIUM CHLORIDE, SODIUM LACTATE, POTASSIUM CHLORIDE, AND CALCIUM CHLORIDE .6; .31; .03; .02 G/100ML; G/100ML; G/100ML; G/100ML
1000 INJECTION, SOLUTION INTRAVENOUS ONCE
Status: COMPLETED | OUTPATIENT
Start: 2024-01-16 | End: 2024-01-16

## 2024-01-16 RX ORDER — SODIUM CHLORIDE 0.9 % (FLUSH) 0.9 %
5-40 SYRINGE (ML) INJECTION EVERY 12 HOURS SCHEDULED
Status: DISCONTINUED | OUTPATIENT
Start: 2024-01-16 | End: 2024-01-23 | Stop reason: HOSPADM

## 2024-01-16 RX ORDER — MAGNESIUM SULFATE IN WATER 40 MG/ML
2000 INJECTION, SOLUTION INTRAVENOUS PRN
Status: DISCONTINUED | OUTPATIENT
Start: 2024-01-16 | End: 2024-01-23 | Stop reason: HOSPADM

## 2024-01-16 RX ORDER — PROCHLORPERAZINE EDISYLATE 5 MG/ML
10 INJECTION INTRAMUSCULAR; INTRAVENOUS ONCE
Status: COMPLETED | OUTPATIENT
Start: 2024-01-16 | End: 2024-01-16

## 2024-01-16 RX ORDER — CIPROFLOXACIN 2 MG/ML
400 INJECTION, SOLUTION INTRAVENOUS EVERY 12 HOURS
Status: DISCONTINUED | OUTPATIENT
Start: 2024-01-17 | End: 2024-01-20

## 2024-01-16 RX ORDER — METRONIDAZOLE 500 MG/100ML
500 INJECTION, SOLUTION INTRAVENOUS ONCE
Status: COMPLETED | OUTPATIENT
Start: 2024-01-16 | End: 2024-01-16

## 2024-01-16 RX ORDER — SODIUM CHLORIDE 9 MG/ML
INJECTION, SOLUTION INTRAVENOUS PRN
Status: DISCONTINUED | OUTPATIENT
Start: 2024-01-16 | End: 2024-01-23 | Stop reason: HOSPADM

## 2024-01-16 RX ORDER — METRONIDAZOLE 500 MG/100ML
500 INJECTION, SOLUTION INTRAVENOUS EVERY 8 HOURS
Status: DISCONTINUED | OUTPATIENT
Start: 2024-01-17 | End: 2024-01-23

## 2024-01-16 RX ORDER — CIPROFLOXACIN 2 MG/ML
400 INJECTION, SOLUTION INTRAVENOUS ONCE
Status: COMPLETED | OUTPATIENT
Start: 2024-01-16 | End: 2024-01-16

## 2024-01-16 RX ORDER — POTASSIUM CHLORIDE 7.45 MG/ML
10 INJECTION INTRAVENOUS PRN
Status: DISCONTINUED | OUTPATIENT
Start: 2024-01-16 | End: 2024-01-23 | Stop reason: HOSPADM

## 2024-01-16 RX ADMIN — PROCHLORPERAZINE EDISYLATE 10 MG: 5 INJECTION INTRAMUSCULAR; INTRAVENOUS at 18:54

## 2024-01-16 RX ADMIN — SODIUM CHLORIDE, POTASSIUM CHLORIDE, SODIUM LACTATE AND CALCIUM CHLORIDE 1000 ML: 600; 310; 30; 20 INJECTION, SOLUTION INTRAVENOUS at 15:24

## 2024-01-16 RX ADMIN — ACETAMINOPHEN 650 MG: 325 TABLET ORAL at 21:48

## 2024-01-16 RX ADMIN — SODIUM CHLORIDE, POTASSIUM CHLORIDE, SODIUM LACTATE AND CALCIUM CHLORIDE: 600; 310; 30; 20 INJECTION, SOLUTION INTRAVENOUS at 20:57

## 2024-01-16 RX ADMIN — METRONIDAZOLE 500 MG: 500 INJECTION, SOLUTION INTRAVENOUS at 16:12

## 2024-01-16 RX ADMIN — ONDANSETRON 4 MG: 2 INJECTION INTRAMUSCULAR; INTRAVENOUS at 14:58

## 2024-01-16 RX ADMIN — MAGNESIUM SULFATE HEPTAHYDRATE 1000 MG: 1 INJECTION, SOLUTION INTRAVENOUS at 16:10

## 2024-01-16 RX ADMIN — ENOXAPARIN SODIUM 40 MG: 100 INJECTION SUBCUTANEOUS at 21:49

## 2024-01-16 RX ADMIN — IOPAMIDOL 75 ML: 755 INJECTION, SOLUTION INTRAVENOUS at 16:56

## 2024-01-16 RX ADMIN — MORPHINE SULFATE 2 MG: 2 INJECTION, SOLUTION INTRAMUSCULAR; INTRAVENOUS at 15:22

## 2024-01-16 RX ADMIN — CIPROFLOXACIN 400 MG: 400 INJECTION, SOLUTION INTRAVENOUS at 17:35

## 2024-01-16 RX ADMIN — SODIUM CHLORIDE, POTASSIUM CHLORIDE, SODIUM LACTATE AND CALCIUM CHLORIDE 1000 ML: 600; 310; 30; 20 INJECTION, SOLUTION INTRAVENOUS at 16:13

## 2024-01-16 ASSESSMENT — PAIN SCALES - GENERAL
PAINLEVEL_OUTOF10: 8
PAINLEVEL_OUTOF10: 4
PAINLEVEL_OUTOF10: 8

## 2024-01-16 ASSESSMENT — PAIN DESCRIPTION - LOCATION
LOCATION: ABDOMEN

## 2024-01-16 ASSESSMENT — PAIN DESCRIPTION - DESCRIPTORS
DESCRIPTORS: CRAMPING
DESCRIPTORS: CRAMPING

## 2024-01-16 ASSESSMENT — LIFESTYLE VARIABLES
HOW MANY STANDARD DRINKS CONTAINING ALCOHOL DO YOU HAVE ON A TYPICAL DAY: 1 OR 2
HOW OFTEN DO YOU HAVE A DRINK CONTAINING ALCOHOL: MONTHLY OR LESS

## 2024-01-16 ASSESSMENT — PAIN DESCRIPTION - PAIN TYPE: TYPE: ACUTE PAIN

## 2024-01-16 ASSESSMENT — PAIN - FUNCTIONAL ASSESSMENT: PAIN_FUNCTIONAL_ASSESSMENT: 0-10

## 2024-01-16 NOTE — ED PROVIDER NOTES
[facility] EMERGENCY DEPARTMENT  [address] [facility] [address]  Dept: [phone]  Loc: [phone]  eMERGENCYdEPARTMENT eNCOUnter      Pt Name: Maricarmen Lowe  MRN: [id number]  Birthdate 1990  Date of evaluation: 1/16/2024  Provider:MARK Alcazar CNP    Independently seen and evaluated by the advanced practice provider  CHIEF COMPLAINT       Chief Complaint   Patient presents with    Abdominal Pain     Abd pain with vomiting and diarrhea, sudden onset 30 minutes ago.         CRITICAL CARE TIME     Because of high probability of sudden clinical deterioration of the patient's condition or  further deterioration, critical care time included my full attention to the patient's condition, including chart data review, documentation, medication ordering, reviewing the patient's old records, reevaluation patient's cardiac, pulmonary and neurological status. Reassessment of vital signs. Consultations with ED attending and admitting physician. Ordering, interpreting reviewing diagnostic testing. Therefore, my critical care time was 60 minutes of direct attention to the patient's condition did not include time spent on procedures.    HISTORY OF PRESENT ILLNESS  (Location/Symptom, Timing/Onset, Context/Setting, Quality, Duration,Modifying Factors, Severity.)   Maricarmen Lowe is a 33 y.o. female who presents to the emergency department PMHx: TMJ    Employed: Works as an MRI technician here at [employer]  CODE STATUS full  Anticoagulation therapy none  Social determinants: None identified    HPI provided by the patient    Patient presented to the emergency department from her MRI department while working today with an acute onset just a short time before coming over to the ED with acute upper abdominal pain associated with nausea vomiting and diarrhea.  Also feeling chills.  Pain is severe in nature.  Nonbilious nonbloody vomiting.  Last meal

## 2024-01-17 LAB
ALBUMIN SERPL-MCNC: 3.4 G/DL (ref 3.4–5)
ALBUMIN/GLOB SERPL: 2.8 {RATIO} (ref 1.1–2.2)
ALP SERPL-CCNC: 33 U/L (ref 40–129)
ALT SERPL-CCNC: 10 U/L (ref 10–40)
ANION GAP SERPL CALCULATED.3IONS-SCNC: 6 MMOL/L (ref 3–16)
AST SERPL-CCNC: 16 U/L (ref 15–37)
BACTERIA UR CULT: NORMAL
BASOPHILS # BLD: 0 K/UL (ref 0–0.2)
BASOPHILS NFR BLD: 0.3 %
BILIRUB SERPL-MCNC: 0.5 MG/DL (ref 0–1)
BUN SERPL-MCNC: 9 MG/DL (ref 7–20)
CALCIUM SERPL-MCNC: 7.2 MG/DL (ref 8.3–10.6)
CHLORIDE SERPL-SCNC: 105 MMOL/L (ref 99–110)
CO2 SERPL-SCNC: 24 MMOL/L (ref 21–32)
CREAT SERPL-MCNC: 0.6 MG/DL (ref 0.6–1.1)
DEPRECATED RDW RBC AUTO: 13.3 % (ref 12.4–15.4)
EOSINOPHIL # BLD: 0 K/UL (ref 0–0.6)
EOSINOPHIL NFR BLD: 0.1 %
GFR SERPLBLD CREATININE-BSD FMLA CKD-EPI: >60 ML/MIN/{1.73_M2}
GI PATHOGENS PNL STL NAA+PROBE: NORMAL
GLUCOSE SERPL-MCNC: 127 MG/DL (ref 70–99)
HCT VFR BLD AUTO: 36.5 % (ref 36–48)
HGB BLD-MCNC: 12.4 G/DL (ref 12–16)
LACTATE BLDV-SCNC: 1.6 MMOL/L (ref 0.4–2)
LYMPHOCYTES # BLD: 0.4 K/UL (ref 1–5.1)
LYMPHOCYTES NFR BLD: 5.4 %
MAGNESIUM SERPL-MCNC: 1.7 MG/DL (ref 1.8–2.4)
MCH RBC QN AUTO: 29.7 PG (ref 26–34)
MCHC RBC AUTO-ENTMCNC: 34 G/DL (ref 31–36)
MCV RBC AUTO: 87.4 FL (ref 80–100)
MONOCYTES # BLD: 0.6 K/UL (ref 0–1.3)
MONOCYTES NFR BLD: 7.2 %
NEUTROPHILS # BLD: 6.7 K/UL (ref 1.7–7.7)
NEUTROPHILS NFR BLD: 87 %
PLATELET # BLD AUTO: 134 K/UL (ref 135–450)
PMV BLD AUTO: 8.5 FL (ref 5–10.5)
POTASSIUM SERPL-SCNC: 3.4 MMOL/L (ref 3.5–5.1)
PROT SERPL-MCNC: 4.6 G/DL (ref 6.4–8.2)
RBC # BLD AUTO: 4.17 M/UL (ref 4–5.2)
SODIUM SERPL-SCNC: 135 MMOL/L (ref 136–145)
WBC # BLD AUTO: 7.7 K/UL (ref 4–11)

## 2024-01-17 PROCEDURE — 83735 ASSAY OF MAGNESIUM: CPT

## 2024-01-17 PROCEDURE — 6370000000 HC RX 637 (ALT 250 FOR IP): Performed by: STUDENT IN AN ORGANIZED HEALTH CARE EDUCATION/TRAINING PROGRAM

## 2024-01-17 PROCEDURE — 2580000003 HC RX 258: Performed by: STUDENT IN AN ORGANIZED HEALTH CARE EDUCATION/TRAINING PROGRAM

## 2024-01-17 PROCEDURE — 36415 COLL VENOUS BLD VENIPUNCTURE: CPT

## 2024-01-17 PROCEDURE — 6370000000 HC RX 637 (ALT 250 FOR IP): Performed by: INTERNAL MEDICINE

## 2024-01-17 PROCEDURE — 83605 ASSAY OF LACTIC ACID: CPT

## 2024-01-17 PROCEDURE — 6360000002 HC RX W HCPCS: Performed by: STUDENT IN AN ORGANIZED HEALTH CARE EDUCATION/TRAINING PROGRAM

## 2024-01-17 PROCEDURE — 94760 N-INVAS EAR/PLS OXIMETRY 1: CPT

## 2024-01-17 PROCEDURE — 1200000000 HC SEMI PRIVATE

## 2024-01-17 PROCEDURE — 85025 COMPLETE CBC W/AUTO DIFF WBC: CPT

## 2024-01-17 PROCEDURE — 80053 COMPREHEN METABOLIC PANEL: CPT

## 2024-01-17 PROCEDURE — 6360000002 HC RX W HCPCS: Performed by: INTERNAL MEDICINE

## 2024-01-17 RX ORDER — POTASSIUM CHLORIDE 20 MEQ/1
40 TABLET, EXTENDED RELEASE ORAL ONCE
Status: COMPLETED | OUTPATIENT
Start: 2024-01-17 | End: 2024-01-17

## 2024-01-17 RX ORDER — MAGNESIUM SULFATE 1 G/100ML
1000 INJECTION INTRAVENOUS ONCE
Status: COMPLETED | OUTPATIENT
Start: 2024-01-17 | End: 2024-01-17

## 2024-01-17 RX ADMIN — MAGNESIUM SULFATE HEPTAHYDRATE 1000 MG: 1 INJECTION, SOLUTION INTRAVENOUS at 09:15

## 2024-01-17 RX ADMIN — METRONIDAZOLE 500 MG: 500 INJECTION, SOLUTION INTRAVENOUS at 23:55

## 2024-01-17 RX ADMIN — ACETAMINOPHEN 650 MG: 325 TABLET ORAL at 09:20

## 2024-01-17 RX ADMIN — POTASSIUM CHLORIDE 40 MEQ: 1500 TABLET, EXTENDED RELEASE ORAL at 09:15

## 2024-01-17 RX ADMIN — METRONIDAZOLE 500 MG: 500 INJECTION, SOLUTION INTRAVENOUS at 01:00

## 2024-01-17 RX ADMIN — ACETAMINOPHEN 650 MG: 325 TABLET ORAL at 22:05

## 2024-01-17 RX ADMIN — CIPROFLOXACIN 400 MG: 400 INJECTION, SOLUTION INTRAVENOUS at 18:10

## 2024-01-17 RX ADMIN — ONDANSETRON 4 MG: 4 TABLET, ORALLY DISINTEGRATING ORAL at 23:46

## 2024-01-17 RX ADMIN — CIPROFLOXACIN 400 MG: 400 INJECTION, SOLUTION INTRAVENOUS at 06:34

## 2024-01-17 RX ADMIN — METRONIDAZOLE 500 MG: 500 INJECTION, SOLUTION INTRAVENOUS at 08:01

## 2024-01-17 RX ADMIN — SODIUM CHLORIDE, POTASSIUM CHLORIDE, SODIUM LACTATE AND CALCIUM CHLORIDE: 600; 310; 30; 20 INJECTION, SOLUTION INTRAVENOUS at 07:59

## 2024-01-17 RX ADMIN — SODIUM CHLORIDE, POTASSIUM CHLORIDE, SODIUM LACTATE AND CALCIUM CHLORIDE: 600; 310; 30; 20 INJECTION, SOLUTION INTRAVENOUS at 22:11

## 2024-01-17 RX ADMIN — METRONIDAZOLE 500 MG: 500 INJECTION, SOLUTION INTRAVENOUS at 16:43

## 2024-01-17 ASSESSMENT — PAIN DESCRIPTION - DESCRIPTORS
DESCRIPTORS: ACHING
DESCRIPTORS: ACHING

## 2024-01-17 ASSESSMENT — PAIN DESCRIPTION - FREQUENCY
FREQUENCY: CONTINUOUS
FREQUENCY: INTERMITTENT

## 2024-01-17 ASSESSMENT — PAIN DESCRIPTION - LOCATION
LOCATION: HEAD
LOCATION: HEAD

## 2024-01-17 ASSESSMENT — PAIN DESCRIPTION - PAIN TYPE
TYPE: ACUTE PAIN
TYPE: ACUTE PAIN

## 2024-01-17 ASSESSMENT — PAIN DESCRIPTION - ONSET
ONSET: ON-GOING
ONSET: ON-GOING

## 2024-01-17 ASSESSMENT — PAIN SCALES - WONG BAKER
WONGBAKER_NUMERICALRESPONSE: 0
WONGBAKER_NUMERICALRESPONSE: 0

## 2024-01-17 ASSESSMENT — PAIN - FUNCTIONAL ASSESSMENT
PAIN_FUNCTIONAL_ASSESSMENT: ACTIVITIES ARE NOT PREVENTED
PAIN_FUNCTIONAL_ASSESSMENT: ACTIVITIES ARE NOT PREVENTED

## 2024-01-17 ASSESSMENT — PAIN SCALES - GENERAL
PAINLEVEL_OUTOF10: 0
PAINLEVEL_OUTOF10: 5
PAINLEVEL_OUTOF10: 3

## 2024-01-17 ASSESSMENT — PAIN DESCRIPTION - ORIENTATION: ORIENTATION: MID

## 2024-01-17 NOTE — PLAN OF CARE
Problem: Discharge Planning  Goal: Discharge to home or other facility with appropriate resources  Outcome: Progressing  Flowsheets (Taken 1/17/2024 0513)  Discharge to home or other facility with appropriate resources:   Identify barriers to discharge with patient and caregiver   Identify discharge learning needs (meds, wound care, etc)   Arrange for needed discharge resources and transportation as appropriate     Problem: Pain  Goal: Verbalizes/displays adequate comfort level or baseline comfort level  Outcome: Progressing  Flowsheets (Taken 1/17/2024 0513)  Verbalizes/displays adequate comfort level or baseline comfort level:   Encourage patient to monitor pain and request assistance   Administer analgesics based on type and severity of pain and evaluate response   Assess pain using appropriate pain scale   Implement non-pharmacological measures as appropriate and evaluate response   Electronically signed by Maura Hsu RN on 1/17/2024 at 5:13 AM

## 2024-01-17 NOTE — CARE COORDINATION
SW following but pt appears independent and likely no dc needs.  Consult SW if needs arise  Electronically signed by JIGAR Lake on 1/17/2024 at 12:42 PM

## 2024-01-17 NOTE — H&P
V2.0  History and Physical      Name:  Maricarmen Lowe /Age/Sex: 1990  (33 y.o. female)   MRN & CSN:  [id number] & [id number] Encounter Date/Time: 2024 10:33 PM EST   Location:  [place] [address]- PCP: Sol Mcfarlane,        Hospital Day: 1    Assessment and Plan:   Maricarmen Lowe is a 33 y.o. female with no significant past medical history who presents to the ED with complaints of acute onsets abdominal pain, nausea, vomiting  Hospital Problems             Last Modified POA    * (Principal) Enterocolitis 2024 Yes     Sepsis secondary to enterocolitis versus urinary tract infection  Lactic acidosis  Dehydration  Electrolyte abnormalities  - Patient presenting with acute onset abdominal pain, nausea, vomiting, diarrhea, fevers, tachypnea, tachycardia, leukocytosis.  CT abdomen and pelvis concerning for diffuse enterocolitis.  Urinalysis with positive leukocytes, nitrites, urine culture, blood cultures pending.  - Placed on IV ciprofloxacin and Flagyl, C. difficile antigen negative, received 30 cc/kg IV fluid bolus, continue with maintenance IV fluids, WBC/fever curve  - Trend BMPs, replace electrolytes as needed, trend lactic acid,   - Placed on clear liquid diet, advance diet as tolerated    Disposition:   Current Living situation: Home  Expected Disposition: Home  Estimated D/C: 3 days    Diet ADULT DIET; Clear Liquid   DVT Prophylaxis [x] Lovenox, []  Heparin, [] SCDs, [] Ambulation,  [] Eliquis, [] Xarelto, [] Coumadin   Code Status Full Code   Surrogate Decision Maker/ POA      Personally reviewed Lab Studies and Imaging     Discussed management of the case with patient, family, ED provider    EKG interpreted personally shows sinus tachycardia    Imaging that was interpreted personally includes CT abdomen pelvis and shows diffuse enterocolitis    Drugs that require monitoring include Lovenox, monitor CBCs        History from:     patient    History of Present Illness:

## 2024-01-17 NOTE — PLAN OF CARE
Problem: Discharge Planning  Goal: Discharge to home or other facility with appropriate resources  1/17/2024 0750 by Edith Marinelli RN  Outcome: Progressing  Flowsheets (Taken 1/17/2024 0750)  Discharge to home or other facility with appropriate resources: Identify barriers to discharge with patient and caregiver  1/17/2024 0513 by Maura Hsu RN  Outcome: Progressing  Flowsheets (Taken 1/17/2024 0513)  Discharge to home or other facility with appropriate resources:   Identify barriers to discharge with patient and caregiver   Identify discharge learning needs (meds, wound care, etc)   Arrange for needed discharge resources and transportation as appropriate     Problem: Pain  Goal: Verbalizes/displays adequate comfort level or baseline comfort level  1/17/2024 0750 by Edith Marinelli RN  Outcome: Progressing  Flowsheets (Taken 1/17/2024 0750)  Verbalizes/displays adequate comfort level or baseline comfort level: Encourage patient to monitor pain and request assistance  1/17/2024 0513 by Maura Hsu RN  Outcome: Progressing  Flowsheets (Taken 1/17/2024 0513)  Verbalizes/displays adequate comfort level or baseline comfort level:   Encourage patient to monitor pain and request assistance   Administer analgesics based on type and severity of pain and evaluate response   Assess pain using appropriate pain scale   Implement non-pharmacological measures as appropriate and evaluate response     Problem: ABCDS Injury Assessment  Goal: Absence of physical injury  Outcome: Progressing  Flowsheets (Taken 1/17/2024 0750)  Absence of Physical Injury: Implement safety measures based on patient assessment

## 2024-01-18 ENCOUNTER — APPOINTMENT (OUTPATIENT)
Dept: GENERAL RADIOLOGY | Age: 34
DRG: 872 | End: 2024-01-18
Payer: COMMERCIAL

## 2024-01-18 LAB
ALBUMIN SERPL-MCNC: 3.1 G/DL (ref 3.4–5)
ALBUMIN/GLOB SERPL: 1.6 {RATIO} (ref 1.1–2.2)
ALP SERPL-CCNC: 35 U/L (ref 40–129)
ALT SERPL-CCNC: 21 U/L (ref 10–40)
ANION GAP SERPL CALCULATED.3IONS-SCNC: 9 MMOL/L (ref 3–16)
AST SERPL-CCNC: 26 U/L (ref 15–37)
BASOPHILS # BLD: 0 K/UL (ref 0–0.2)
BASOPHILS NFR BLD: 0.2 %
BILIRUB SERPL-MCNC: <0.2 MG/DL (ref 0–1)
BUN SERPL-MCNC: 6 MG/DL (ref 7–20)
CALCIUM SERPL-MCNC: 6.9 MG/DL (ref 8.3–10.6)
CHLORIDE SERPL-SCNC: 109 MMOL/L (ref 99–110)
CO2 SERPL-SCNC: 21 MMOL/L (ref 21–32)
CREAT SERPL-MCNC: 0.5 MG/DL (ref 0.6–1.1)
DEPRECATED RDW RBC AUTO: 13.6 % (ref 12.4–15.4)
EOSINOPHIL # BLD: 0.1 K/UL (ref 0–0.6)
EOSINOPHIL NFR BLD: 2.3 %
GFR SERPLBLD CREATININE-BSD FMLA CKD-EPI: >60 ML/MIN/{1.73_M2}
GLUCOSE SERPL-MCNC: 94 MG/DL (ref 70–99)
HCT VFR BLD AUTO: 35.3 % (ref 36–48)
HGB BLD-MCNC: 12.3 G/DL (ref 12–16)
LACTATE BLDV-SCNC: 0.8 MMOL/L (ref 0.4–2)
LYMPHOCYTES # BLD: 0.7 K/UL (ref 1–5.1)
LYMPHOCYTES NFR BLD: 10.6 %
MCH RBC QN AUTO: 30.3 PG (ref 26–34)
MCHC RBC AUTO-ENTMCNC: 34.8 G/DL (ref 31–36)
MCV RBC AUTO: 86.9 FL (ref 80–100)
MONOCYTES # BLD: 0.6 K/UL (ref 0–1.3)
MONOCYTES NFR BLD: 9 %
NEUTROPHILS # BLD: 4.8 K/UL (ref 1.7–7.7)
NEUTROPHILS NFR BLD: 77.9 %
PLATELET # BLD AUTO: 127 K/UL (ref 135–450)
PMV BLD AUTO: 8.8 FL (ref 5–10.5)
POTASSIUM SERPL-SCNC: 3.8 MMOL/L (ref 3.5–5.1)
PROT SERPL-MCNC: 5 G/DL (ref 6.4–8.2)
RBC # BLD AUTO: 4.06 M/UL (ref 4–5.2)
SODIUM SERPL-SCNC: 139 MMOL/L (ref 136–145)
WBC # BLD AUTO: 6.2 K/UL (ref 4–11)

## 2024-01-18 PROCEDURE — 83605 ASSAY OF LACTIC ACID: CPT

## 2024-01-18 PROCEDURE — 80053 COMPREHEN METABOLIC PANEL: CPT

## 2024-01-18 PROCEDURE — 6360000002 HC RX W HCPCS: Performed by: INTERNAL MEDICINE

## 2024-01-18 PROCEDURE — 36415 COLL VENOUS BLD VENIPUNCTURE: CPT

## 2024-01-18 PROCEDURE — 85025 COMPLETE CBC W/AUTO DIFF WBC: CPT

## 2024-01-18 PROCEDURE — 6360000002 HC RX W HCPCS: Performed by: STUDENT IN AN ORGANIZED HEALTH CARE EDUCATION/TRAINING PROGRAM

## 2024-01-18 PROCEDURE — 6370000000 HC RX 637 (ALT 250 FOR IP): Performed by: NURSE PRACTITIONER

## 2024-01-18 PROCEDURE — 2580000003 HC RX 258: Performed by: STUDENT IN AN ORGANIZED HEALTH CARE EDUCATION/TRAINING PROGRAM

## 2024-01-18 PROCEDURE — 1200000000 HC SEMI PRIVATE

## 2024-01-18 PROCEDURE — 74018 RADEX ABDOMEN 1 VIEW: CPT

## 2024-01-18 PROCEDURE — 94760 N-INVAS EAR/PLS OXIMETRY 1: CPT

## 2024-01-18 PROCEDURE — 6370000000 HC RX 637 (ALT 250 FOR IP): Performed by: PHYSICIAN ASSISTANT

## 2024-01-18 RX ORDER — PANTOPRAZOLE SODIUM 40 MG/1
40 TABLET, DELAYED RELEASE ORAL
Status: DISCONTINUED | OUTPATIENT
Start: 2024-01-19 | End: 2024-01-18

## 2024-01-18 RX ORDER — LOPERAMIDE HYDROCHLORIDE 2 MG/1
2 CAPSULE ORAL 4 TIMES DAILY PRN
Status: DISCONTINUED | OUTPATIENT
Start: 2024-01-18 | End: 2024-01-21

## 2024-01-18 RX ORDER — MORPHINE SULFATE 2 MG/ML
2 INJECTION, SOLUTION INTRAMUSCULAR; INTRAVENOUS ONCE
Status: COMPLETED | OUTPATIENT
Start: 2024-01-18 | End: 2024-01-18

## 2024-01-18 RX ORDER — DICYCLOMINE HYDROCHLORIDE 10 MG/1
10 CAPSULE ORAL 3 TIMES DAILY PRN
Status: DISCONTINUED | OUTPATIENT
Start: 2024-01-18 | End: 2024-01-23 | Stop reason: HOSPADM

## 2024-01-18 RX ORDER — PANTOPRAZOLE SODIUM 40 MG/1
40 TABLET, DELAYED RELEASE ORAL
Status: DISCONTINUED | OUTPATIENT
Start: 2024-01-18 | End: 2024-01-19

## 2024-01-18 RX ADMIN — CIPROFLOXACIN 400 MG: 400 INJECTION, SOLUTION INTRAVENOUS at 05:30

## 2024-01-18 RX ADMIN — METRONIDAZOLE 500 MG: 500 INJECTION, SOLUTION INTRAVENOUS at 23:43

## 2024-01-18 RX ADMIN — PANTOPRAZOLE SODIUM 40 MG: 40 TABLET, DELAYED RELEASE ORAL at 14:57

## 2024-01-18 RX ADMIN — LOPERAMIDE HYDROCHLORIDE 2 MG: 2 CAPSULE ORAL at 14:48

## 2024-01-18 RX ADMIN — ONDANSETRON 4 MG: 2 INJECTION INTRAMUSCULAR; INTRAVENOUS at 19:58

## 2024-01-18 RX ADMIN — Medication 10 ML: at 20:01

## 2024-01-18 RX ADMIN — DICYCLOMINE HYDROCHLORIDE 10 MG: 10 CAPSULE ORAL at 04:02

## 2024-01-18 RX ADMIN — METRONIDAZOLE 500 MG: 500 INJECTION, SOLUTION INTRAVENOUS at 16:22

## 2024-01-18 RX ADMIN — SODIUM CHLORIDE, POTASSIUM CHLORIDE, SODIUM LACTATE AND CALCIUM CHLORIDE: 600; 310; 30; 20 INJECTION, SOLUTION INTRAVENOUS at 16:21

## 2024-01-18 RX ADMIN — ONDANSETRON 4 MG: 2 INJECTION INTRAMUSCULAR; INTRAVENOUS at 14:01

## 2024-01-18 RX ADMIN — METRONIDAZOLE 500 MG: 500 INJECTION, SOLUTION INTRAVENOUS at 08:27

## 2024-01-18 RX ADMIN — CIPROFLOXACIN 400 MG: 400 INJECTION, SOLUTION INTRAVENOUS at 18:28

## 2024-01-18 RX ADMIN — MORPHINE SULFATE 2 MG: 2 INJECTION, SOLUTION INTRAMUSCULAR; INTRAVENOUS at 06:47

## 2024-01-18 RX ADMIN — ONDANSETRON 4 MG: 2 INJECTION INTRAMUSCULAR; INTRAVENOUS at 08:25

## 2024-01-18 ASSESSMENT — PAIN DESCRIPTION - ONSET
ONSET: ON-GOING

## 2024-01-18 ASSESSMENT — PAIN SCALES - GENERAL
PAINLEVEL_OUTOF10: 0
PAINLEVEL_OUTOF10: 3
PAINLEVEL_OUTOF10: 8
PAINLEVEL_OUTOF10: 4
PAINLEVEL_OUTOF10: 10

## 2024-01-18 ASSESSMENT — PAIN DESCRIPTION - PAIN TYPE
TYPE: ACUTE PAIN

## 2024-01-18 ASSESSMENT — PAIN DESCRIPTION - ORIENTATION
ORIENTATION: MID

## 2024-01-18 ASSESSMENT — PAIN DESCRIPTION - FREQUENCY
FREQUENCY: CONTINUOUS

## 2024-01-18 ASSESSMENT — PAIN SCALES - WONG BAKER
WONGBAKER_NUMERICALRESPONSE: 0
WONGBAKER_NUMERICALRESPONSE: 0

## 2024-01-18 ASSESSMENT — PAIN DESCRIPTION - LOCATION
LOCATION: ABDOMEN

## 2024-01-18 ASSESSMENT — PAIN - FUNCTIONAL ASSESSMENT
PAIN_FUNCTIONAL_ASSESSMENT: ACTIVITIES ARE NOT PREVENTED

## 2024-01-18 ASSESSMENT — PAIN DESCRIPTION - DESCRIPTORS
DESCRIPTORS: CRAMPING

## 2024-01-18 NOTE — PLAN OF CARE
Problem: Discharge Planning  Goal: Discharge to home or other facility with appropriate resources  Outcome: Progressing  Flowsheets  Taken 1/18/2024 1016 by Edith Marinelli RN  Discharge to home or other facility with appropriate resources: Identify barriers to discharge with patient and caregiver  Taken 1/17/2024 2219 by Devi Coon, RN  Discharge to home or other facility with appropriate resources:   Identify barriers to discharge with patient and caregiver   Arrange for needed discharge resources and transportation as appropriate     Problem: Pain  Goal: Verbalizes/displays adequate comfort level or baseline comfort level  Outcome: Progressing  Flowsheets (Taken 1/18/2024 1016)  Verbalizes/displays adequate comfort level or baseline comfort level: Encourage patient to monitor pain and request assistance     Problem: ABCDS Injury Assessment  Goal: Absence of physical injury  Outcome: Progressing  Flowsheets (Taken 1/18/2024 1016)  Absence of Physical Injury: Implement safety measures based on patient assessment

## 2024-01-18 NOTE — CONSULTS
GASTROENTEROLOGY INPATIENT CONSULTATION        IDENTIFYING DATA/REASON FOR CONSULTATION   PATIENT:  Maricarmen Lowe  MRN:  [id number]  ADMIT DATE: 1/16/2024  TIME OF EVALUATION: 1/18/2024 12:24 PM  HOSPITAL STAY:   LOS: 2 days     REASON FOR CONSULTATION:  abdominal pain    HISTORY OF PRESENT ILLNESS   Maricarmen Lowe is a 33 y.o. female with a PMH of TMJ s/p reconstructive jaw surgery who presented on 1/16/2024 with acute onset abdominal pain, nausea, vomiting, diarrhea.       Recently traveled to [place], came back Sunday.  Symptoms started on Tuesday morning while at work.  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  She consumed a coffee from [employer] that morning, and developed acute onset of nausea, vomiting, abdominal pain followed by watery diarrhea.  Emesis green.  Bm watery, nonbloody      CT A/P showed fluid throughout a nondilated small bowel and colon with diffuse mild mucosal thickening of the small bowel consistent with nonspecific enterocolitis.    Initial white count 16.8 which is since normalized.   Initial lactic acid 4.0 which is since normalized  K on admission 3.4 which has since been replaced  LFTs unremarkable  Stool studies negative for C. difficile and bacterial pathogens  Rapid flu test negative  Blood cultures no growth to date  UA consistent with UTI.  Urine cultures pending        PAST MEDICAL, SURGICAL, FAMILY, and SOCIAL HISTORY     Past Medical History:   Diagnosis Date    TMJ (temporomandibular joint disorder)      Past Surgical History:   Procedure Laterality Date    KNEE ARTHROSCOPY Right 8/18/2021    RIGHT KNEE ARTHROSCOPY EXCISION FAT PAD performed by Nilesh Barton MD at [facility] OR    MOUTH SURGERY  2007    Recontructive jaw surgery    TONSILLECTOMY      WISDOM TOOTH EXTRACTION       Family History   Problem Relation Age of Onset    Heart Disease Mother         Mitral valve disease    High Blood Pressure Mother     Heart Disease Father         Aortic valve disease     Social

## 2024-01-18 NOTE — PLAN OF CARE
Problem: Discharge Planning  Goal: Discharge to home or other facility with appropriate resources  1/17/2024 1952 by Devi Coon, RN  Outcome: Progressing  Flowsheets (Taken 1/17/2024 0750 by Edith Marinelli, RN)  Discharge to home or other facility with appropriate resources: Identify barriers to discharge with patient and caregiver  1/17/2024 0750 by Edith Marinelli RN  Outcome: Progressing  Flowsheets (Taken 1/17/2024 0750)  Discharge to home or other facility with appropriate resources: Identify barriers to discharge with patient and caregiver     Problem: Pain  Goal: Verbalizes/displays adequate comfort level or baseline comfort level  1/17/2024 1952 by Devi Coon RN  Outcome: Progressing  Flowsheets (Taken 1/17/2024 0750 by Marinelli, Edith, RN)  Verbalizes/displays adequate comfort level or baseline comfort level: Encourage patient to monitor pain and request assistance  1/17/2024 0750 by Edith Marinelli RN  Outcome: Progressing  Flowsheets (Taken 1/17/2024 0750)  Verbalizes/displays adequate comfort level or baseline comfort level: Encourage patient to monitor pain and request assistance     Problem: ABCDS Injury Assessment  Goal: Absence of physical injury  1/17/2024 1952 by Devi Coon RN  Outcome: Progressing  Flowsheets (Taken 1/17/2024 0750 by Edith Marinelli, RN)  Absence of Physical Injury: Implement safety measures based on patient assessment  1/17/2024 0750 by Edith Marinelli RN  Outcome: Progressing  Flowsheets (Taken 1/17/2024 0750)  Absence of Physical Injury: Implement safety measures based on patient assessment

## 2024-01-19 PROCEDURE — 1200000000 HC SEMI PRIVATE

## 2024-01-19 PROCEDURE — 6370000000 HC RX 637 (ALT 250 FOR IP): Performed by: NURSE PRACTITIONER

## 2024-01-19 PROCEDURE — 94760 N-INVAS EAR/PLS OXIMETRY 1: CPT

## 2024-01-19 PROCEDURE — 6360000002 HC RX W HCPCS: Performed by: STUDENT IN AN ORGANIZED HEALTH CARE EDUCATION/TRAINING PROGRAM

## 2024-01-19 PROCEDURE — 6370000000 HC RX 637 (ALT 250 FOR IP): Performed by: PHYSICIAN ASSISTANT

## 2024-01-19 PROCEDURE — 6370000000 HC RX 637 (ALT 250 FOR IP): Performed by: INTERNAL MEDICINE

## 2024-01-19 PROCEDURE — 2580000003 HC RX 258: Performed by: STUDENT IN AN ORGANIZED HEALTH CARE EDUCATION/TRAINING PROGRAM

## 2024-01-19 RX ORDER — CIPROFLOXACIN 500 MG/1
500 TABLET, FILM COATED ORAL 2 TIMES DAILY
Qty: 8 TABLET | Refills: 0 | Status: SHIPPED | OUTPATIENT
Start: 2024-01-19 | End: 2024-01-20 | Stop reason: HOSPADM

## 2024-01-19 RX ORDER — METRONIDAZOLE 500 MG/1
500 TABLET ORAL 3 TIMES DAILY
Qty: 12 TABLET | Refills: 0 | Status: SHIPPED | OUTPATIENT
Start: 2024-01-19 | End: 2024-01-23 | Stop reason: HOSPADM

## 2024-01-19 RX ORDER — PANTOPRAZOLE SODIUM 40 MG/1
40 TABLET, DELAYED RELEASE ORAL
Qty: 30 TABLET | Refills: 0 | Status: SHIPPED | OUTPATIENT
Start: 2024-01-20 | End: 2024-01-19

## 2024-01-19 RX ORDER — PANTOPRAZOLE SODIUM 40 MG/1
40 TABLET, DELAYED RELEASE ORAL
Status: DISCONTINUED | OUTPATIENT
Start: 2024-01-19 | End: 2024-01-23 | Stop reason: HOSPADM

## 2024-01-19 RX ORDER — PANTOPRAZOLE SODIUM 40 MG/1
40 TABLET, DELAYED RELEASE ORAL 2 TIMES DAILY
Qty: 60 TABLET | Refills: 0 | Status: SHIPPED | OUTPATIENT
Start: 2024-01-19

## 2024-01-19 RX ADMIN — Medication 10 ML: at 20:46

## 2024-01-19 RX ADMIN — SODIUM CHLORIDE: 9 INJECTION, SOLUTION INTRAVENOUS at 05:59

## 2024-01-19 RX ADMIN — DICYCLOMINE HYDROCHLORIDE 10 MG: 10 CAPSULE ORAL at 14:30

## 2024-01-19 RX ADMIN — PANTOPRAZOLE SODIUM 40 MG: 40 TABLET, DELAYED RELEASE ORAL at 16:47

## 2024-01-19 RX ADMIN — ONDANSETRON 4 MG: 2 INJECTION INTRAMUSCULAR; INTRAVENOUS at 14:30

## 2024-01-19 RX ADMIN — ONDANSETRON 4 MG: 2 INJECTION INTRAMUSCULAR; INTRAVENOUS at 06:07

## 2024-01-19 RX ADMIN — ONDANSETRON 4 MG: 2 INJECTION INTRAMUSCULAR; INTRAVENOUS at 20:45

## 2024-01-19 RX ADMIN — METRONIDAZOLE 500 MG: 500 INJECTION, SOLUTION INTRAVENOUS at 16:47

## 2024-01-19 RX ADMIN — PANTOPRAZOLE SODIUM 40 MG: 40 TABLET, DELAYED RELEASE ORAL at 06:02

## 2024-01-19 RX ADMIN — METRONIDAZOLE 500 MG: 500 INJECTION, SOLUTION INTRAVENOUS at 08:49

## 2024-01-19 RX ADMIN — Medication 10 ML: at 08:50

## 2024-01-19 RX ADMIN — DICYCLOMINE HYDROCHLORIDE 10 MG: 10 CAPSULE ORAL at 03:38

## 2024-01-19 RX ADMIN — CIPROFLOXACIN 400 MG: 400 INJECTION, SOLUTION INTRAVENOUS at 06:02

## 2024-01-19 RX ADMIN — CIPROFLOXACIN 400 MG: 400 INJECTION, SOLUTION INTRAVENOUS at 18:09

## 2024-01-19 ASSESSMENT — PAIN DESCRIPTION - PAIN TYPE: TYPE: ACUTE PAIN

## 2024-01-19 ASSESSMENT — PAIN DESCRIPTION - DESCRIPTORS
DESCRIPTORS: DULL;DISCOMFORT;CRAMPING
DESCRIPTORS: ACHING;DULL;DISCOMFORT

## 2024-01-19 ASSESSMENT — PAIN DESCRIPTION - ORIENTATION
ORIENTATION: MID;UPPER
ORIENTATION: MID;UPPER

## 2024-01-19 ASSESSMENT — PAIN DESCRIPTION - LOCATION
LOCATION: ABDOMEN
LOCATION: ABDOMEN

## 2024-01-19 ASSESSMENT — PAIN SCALES - GENERAL
PAINLEVEL_OUTOF10: 6
PAINLEVEL_OUTOF10: 0
PAINLEVEL_OUTOF10: 2

## 2024-01-19 ASSESSMENT — PAIN DESCRIPTION - FREQUENCY: FREQUENCY: CONTINUOUS

## 2024-01-19 ASSESSMENT — PAIN DESCRIPTION - ONSET: ONSET: ON-GOING

## 2024-01-19 NOTE — PLAN OF CARE
Problem: Discharge Planning  Goal: Discharge to home or other facility with appropriate resources  1/19/2024 0813 by Ruthie Boudreaux RN  Outcome: Progressing  Flowsheets (Taken 1/18/2024 2012 by Devi Coon, RN)  Discharge to home or other facility with appropriate resources:   Identify barriers to discharge with patient and caregiver   Arrange for needed discharge resources and transportation as appropriate     Problem: Pain  Goal: Verbalizes/displays adequate comfort level or baseline comfort level  1/19/2024 0813 by Ruthie Boudreaux RN  Outcome: Progressing  Flowsheets (Taken 1/18/2024 1016 by Marinelli, Edith, RN)  Verbalizes/displays adequate comfort level or baseline comfort level: Encourage patient to monitor pain and request assistance     Problem: ABCDS Injury Assessment  Goal: Absence of physical injury  1/19/2024 0813 by Ruthie Boudreaux RN  Outcome: Progressing  Flowsheets (Taken 1/18/2024 1944 by Devi Coon, RN)  Absence of Physical Injury: Implement safety measures based on patient assessment

## 2024-01-19 NOTE — PLAN OF CARE
Problem: Discharge Planning  Goal: Discharge to home or other facility with appropriate resources  1/18/2024 1943 by Devi Coon RN  Outcome: Progressing  Flowsheets (Taken 1/18/2024 1016 by Edith Marinelli RN)  Discharge to home or other facility with appropriate resources: Identify barriers to discharge with patient and caregiver  1/18/2024 1016 by Edith Marinelli RN  Outcome: Progressing  Flowsheets  Taken 1/18/2024 1016 by Edith Marinelli RN  Discharge to home or other facility with appropriate resources: Identify barriers to discharge with patient and caregiver  Taken 1/17/2024 2219 by Devi Coon RN  Discharge to home or other facility with appropriate resources:   Identify barriers to discharge with patient and caregiver   Arrange for needed discharge resources and transportation as appropriate     Problem: Pain  Goal: Verbalizes/displays adequate comfort level or baseline comfort level  1/18/2024 1943 by Devi Coon RN  Outcome: Progressing  Flowsheets (Taken 1/18/2024 1016 by Marinelli, Edith, RN)  Verbalizes/displays adequate comfort level or baseline comfort level: Encourage patient to monitor pain and request assistance  1/18/2024 1016 by Edith Marinelli RN  Outcome: Progressing  Flowsheets (Taken 1/18/2024 1016)  Verbalizes/displays adequate comfort level or baseline comfort level: Encourage patient to monitor pain and request assistance     Problem: ABCDS Injury Assessment  Goal: Absence of physical injury  1/18/2024 1943 by Devi Coon RN  Outcome: Progressing  Flowsheets (Taken 1/18/2024 1016 by Edith Marinelli RN)  Absence of Physical Injury: Implement safety measures based on patient assessment  1/18/2024 1016 by Edith Marinelli RN  Outcome: Progressing  Flowsheets (Taken 1/18/2024 1016)  Absence of Physical Injury: Implement safety measures based on patient assessment

## 2024-01-20 ENCOUNTER — APPOINTMENT (OUTPATIENT)
Dept: MRI IMAGING | Age: 34
DRG: 872 | End: 2024-01-20
Payer: COMMERCIAL

## 2024-01-20 LAB
ALBUMIN SERPL-MCNC: 3.4 G/DL (ref 3.4–5)
ALBUMIN/GLOB SERPL: 2.1 {RATIO} (ref 1.1–2.2)
ALP SERPL-CCNC: 37 U/L (ref 40–129)
ALT SERPL-CCNC: 104 U/L (ref 10–40)
ANA SER QL IA: NEGATIVE
ANION GAP SERPL CALCULATED.3IONS-SCNC: 8 MMOL/L (ref 3–16)
AST SERPL-CCNC: 158 U/L (ref 15–37)
BASOPHILS # BLD: 0 K/UL (ref 0–0.2)
BASOPHILS NFR BLD: 0.4 %
BILIRUB SERPL-MCNC: 0.3 MG/DL (ref 0–1)
BUN SERPL-MCNC: 5 MG/DL (ref 7–20)
CALCIUM SERPL-MCNC: 7.2 MG/DL (ref 8.3–10.6)
CHLORIDE SERPL-SCNC: 107 MMOL/L (ref 99–110)
CO2 SERPL-SCNC: 23 MMOL/L (ref 21–32)
CREAT SERPL-MCNC: <0.5 MG/DL (ref 0.6–1.1)
DEPRECATED RDW RBC AUTO: 13.5 % (ref 12.4–15.4)
EOSINOPHIL # BLD: 0.2 K/UL (ref 0–0.6)
EOSINOPHIL NFR BLD: 3.7 %
FERRITIN SERPL IA-MCNC: 69.2 NG/ML (ref 15–150)
FOLATE SERPL-MCNC: 14.17 NG/ML (ref 4.78–24.2)
GFR SERPLBLD CREATININE-BSD FMLA CKD-EPI: >60 ML/MIN/{1.73_M2}
GLUCOSE SERPL-MCNC: 90 MG/DL (ref 70–99)
HAV IGM SERPL QL IA: NORMAL
HBV CORE IGM SERPL QL IA: NORMAL
HBV SURFACE AB SERPL IA-ACNC: 28.71 MIU/ML
HBV SURFACE AG SERPL QL IA: NORMAL
HCT VFR BLD AUTO: 35.4 % (ref 36–48)
HCV AB SERPL QL IA: NORMAL
HGB BLD-MCNC: 12.3 G/DL (ref 12–16)
INR PPP: 1.11 (ref 0.84–1.16)
IRON SATN MFR SERPL: 12 % (ref 15–50)
IRON SERPL-MCNC: 29 UG/DL (ref 37–145)
LYMPHOCYTES # BLD: 1.4 K/UL (ref 1–5.1)
LYMPHOCYTES NFR BLD: 25.6 %
MCH RBC QN AUTO: 29.9 PG (ref 26–34)
MCHC RBC AUTO-ENTMCNC: 34.8 G/DL (ref 31–36)
MCV RBC AUTO: 86 FL (ref 80–100)
MONOCYTES # BLD: 0.7 K/UL (ref 0–1.3)
MONOCYTES NFR BLD: 13.3 %
NEUTROPHILS # BLD: 3.1 K/UL (ref 1.7–7.7)
NEUTROPHILS NFR BLD: 57 %
PLATELET # BLD AUTO: 144 K/UL (ref 135–450)
PMV BLD AUTO: 8.9 FL (ref 5–10.5)
POTASSIUM SERPL-SCNC: 3.4 MMOL/L (ref 3.5–5.1)
PROT SERPL-MCNC: 5 G/DL (ref 6.4–8.2)
PROTHROMBIN TIME: 14.3 SEC (ref 11.5–14.8)
RBC # BLD AUTO: 4.11 M/UL (ref 4–5.2)
SODIUM SERPL-SCNC: 138 MMOL/L (ref 136–145)
TIBC SERPL-MCNC: 239 UG/DL (ref 260–445)
VIT B12 SERPL-MCNC: 1586 PG/ML (ref 211–911)
WBC # BLD AUTO: 5.4 K/UL (ref 4–11)

## 2024-01-20 PROCEDURE — 85610 PROTHROMBIN TIME: CPT

## 2024-01-20 PROCEDURE — 86038 ANTINUCLEAR ANTIBODIES: CPT

## 2024-01-20 PROCEDURE — 74181 MRI ABDOMEN W/O CONTRAST: CPT

## 2024-01-20 PROCEDURE — 85025 COMPLETE CBC W/AUTO DIFF WBC: CPT

## 2024-01-20 PROCEDURE — 86706 HEP B SURFACE ANTIBODY: CPT

## 2024-01-20 PROCEDURE — 6370000000 HC RX 637 (ALT 250 FOR IP): Performed by: INTERNAL MEDICINE

## 2024-01-20 PROCEDURE — 36415 COLL VENOUS BLD VENIPUNCTURE: CPT

## 2024-01-20 PROCEDURE — 6360000002 HC RX W HCPCS: Performed by: INTERNAL MEDICINE

## 2024-01-20 PROCEDURE — 83540 ASSAY OF IRON: CPT

## 2024-01-20 PROCEDURE — 82746 ASSAY OF FOLIC ACID SERUM: CPT

## 2024-01-20 PROCEDURE — 94760 N-INVAS EAR/PLS OXIMETRY 1: CPT

## 2024-01-20 PROCEDURE — 1200000000 HC SEMI PRIVATE

## 2024-01-20 PROCEDURE — 2580000003 HC RX 258: Performed by: STUDENT IN AN ORGANIZED HEALTH CARE EDUCATION/TRAINING PROGRAM

## 2024-01-20 PROCEDURE — 82607 VITAMIN B-12: CPT

## 2024-01-20 PROCEDURE — 6360000002 HC RX W HCPCS: Performed by: STUDENT IN AN ORGANIZED HEALTH CARE EDUCATION/TRAINING PROGRAM

## 2024-01-20 PROCEDURE — 80074 ACUTE HEPATITIS PANEL: CPT

## 2024-01-20 PROCEDURE — 83550 IRON BINDING TEST: CPT

## 2024-01-20 PROCEDURE — 2580000003 HC RX 258: Performed by: INTERNAL MEDICINE

## 2024-01-20 PROCEDURE — 86708 HEPATITIS A ANTIBODY: CPT

## 2024-01-20 PROCEDURE — 86704 HEP B CORE ANTIBODY TOTAL: CPT

## 2024-01-20 PROCEDURE — 80053 COMPREHEN METABOLIC PANEL: CPT

## 2024-01-20 PROCEDURE — 82728 ASSAY OF FERRITIN: CPT

## 2024-01-20 RX ORDER — POTASSIUM CHLORIDE 20 MEQ/1
40 TABLET, EXTENDED RELEASE ORAL ONCE
Status: COMPLETED | OUTPATIENT
Start: 2024-01-20 | End: 2024-01-20

## 2024-01-20 RX ADMIN — PANTOPRAZOLE SODIUM 40 MG: 40 TABLET, DELAYED RELEASE ORAL at 06:59

## 2024-01-20 RX ADMIN — WATER 1000 MG: 1 INJECTION INTRAMUSCULAR; INTRAVENOUS; SUBCUTANEOUS at 09:56

## 2024-01-20 RX ADMIN — PANTOPRAZOLE SODIUM 40 MG: 40 TABLET, DELAYED RELEASE ORAL at 15:47

## 2024-01-20 RX ADMIN — METRONIDAZOLE 500 MG: 500 INJECTION, SOLUTION INTRAVENOUS at 23:55

## 2024-01-20 RX ADMIN — METRONIDAZOLE 500 MG: 500 INJECTION, SOLUTION INTRAVENOUS at 08:18

## 2024-01-20 RX ADMIN — POTASSIUM CHLORIDE 40 MEQ: 1500 TABLET, EXTENDED RELEASE ORAL at 08:43

## 2024-01-20 RX ADMIN — METRONIDAZOLE 500 MG: 500 INJECTION, SOLUTION INTRAVENOUS at 15:49

## 2024-01-20 RX ADMIN — CIPROFLOXACIN 400 MG: 400 INJECTION, SOLUTION INTRAVENOUS at 05:57

## 2024-01-20 RX ADMIN — SODIUM CHLORIDE: 9 INJECTION, SOLUTION INTRAVENOUS at 15:47

## 2024-01-20 RX ADMIN — METRONIDAZOLE 500 MG: 500 INJECTION, SOLUTION INTRAVENOUS at 00:49

## 2024-01-20 NOTE — PLAN OF CARE
Problem: Discharge Planning  Goal: Discharge to home or other facility with appropriate resources  Outcome: Progressing  Flowsheets (Taken 1/18/2024 2012 by Devi Coon, RN)  Discharge to home or other facility with appropriate resources:   Identify barriers to discharge with patient and caregiver   Arrange for needed discharge resources and transportation as appropriate     Problem: Pain  Goal: Verbalizes/displays adequate comfort level or baseline comfort level  Outcome: Progressing  Flowsheets (Taken 1/18/2024 1016 by Edith Marinelli, RN)  Verbalizes/displays adequate comfort level or baseline comfort level: Encourage patient to monitor pain and request assistance     Problem: ABCDS Injury Assessment  Goal: Absence of physical injury  Outcome: Progressing  Flowsheets (Taken 1/20/2024 0137 by RAHUL WOODS)  Absence of Physical Injury: Implement safety measures based on patient assessment

## 2024-01-21 LAB
ALBUMIN SERPL-MCNC: 3.8 G/DL (ref 3.4–5)
ALBUMIN/GLOB SERPL: 2.4 {RATIO} (ref 1.1–2.2)
ALP SERPL-CCNC: 39 U/L (ref 40–129)
ALT SERPL-CCNC: 123 U/L (ref 10–40)
ANION GAP SERPL CALCULATED.3IONS-SCNC: 9 MMOL/L (ref 3–16)
AST SERPL-CCNC: 116 U/L (ref 15–37)
BACTERIA BLD CULT ORG #2: NORMAL
BACTERIA BLD CULT: NORMAL
BILIRUB SERPL-MCNC: 0.4 MG/DL (ref 0–1)
BUN SERPL-MCNC: 8 MG/DL (ref 7–20)
CALCIUM SERPL-MCNC: 7.8 MG/DL (ref 8.3–10.6)
CHLORIDE SERPL-SCNC: 106 MMOL/L (ref 99–110)
CO2 SERPL-SCNC: 22 MMOL/L (ref 21–32)
CREAT SERPL-MCNC: <0.5 MG/DL (ref 0.6–1.1)
GFR SERPLBLD CREATININE-BSD FMLA CKD-EPI: >60 ML/MIN/{1.73_M2}
GLUCOSE SERPL-MCNC: 89 MG/DL (ref 70–99)
POTASSIUM SERPL-SCNC: 3.9 MMOL/L (ref 3.5–5.1)
PROT SERPL-MCNC: 5.4 G/DL (ref 6.4–8.2)
SODIUM SERPL-SCNC: 137 MMOL/L (ref 136–145)

## 2024-01-21 PROCEDURE — 94760 N-INVAS EAR/PLS OXIMETRY 1: CPT

## 2024-01-21 PROCEDURE — 6360000002 HC RX W HCPCS: Performed by: STUDENT IN AN ORGANIZED HEALTH CARE EDUCATION/TRAINING PROGRAM

## 2024-01-21 PROCEDURE — 6370000000 HC RX 637 (ALT 250 FOR IP): Performed by: INTERNAL MEDICINE

## 2024-01-21 PROCEDURE — 36415 COLL VENOUS BLD VENIPUNCTURE: CPT

## 2024-01-21 PROCEDURE — 1200000000 HC SEMI PRIVATE

## 2024-01-21 PROCEDURE — 80053 COMPREHEN METABOLIC PANEL: CPT

## 2024-01-21 PROCEDURE — 2580000003 HC RX 258: Performed by: STUDENT IN AN ORGANIZED HEALTH CARE EDUCATION/TRAINING PROGRAM

## 2024-01-21 RX ORDER — POLYETHYLENE GLYCOL 3350 17 G/17G
17 POWDER, FOR SOLUTION ORAL DAILY
Status: DISCONTINUED | OUTPATIENT
Start: 2024-01-21 | End: 2024-01-23 | Stop reason: HOSPADM

## 2024-01-21 RX ADMIN — METRONIDAZOLE 500 MG: 500 INJECTION, SOLUTION INTRAVENOUS at 15:59

## 2024-01-21 RX ADMIN — Medication 10 ML: at 07:56

## 2024-01-21 RX ADMIN — PANTOPRAZOLE SODIUM 40 MG: 40 TABLET, DELAYED RELEASE ORAL at 05:57

## 2024-01-21 RX ADMIN — POLYETHYLENE GLYCOL 3350 17 G: 17 POWDER, FOR SOLUTION ORAL at 10:28

## 2024-01-21 RX ADMIN — PANTOPRAZOLE SODIUM 40 MG: 40 TABLET, DELAYED RELEASE ORAL at 15:59

## 2024-01-21 RX ADMIN — METRONIDAZOLE 500 MG: 500 INJECTION, SOLUTION INTRAVENOUS at 07:58

## 2024-01-21 ASSESSMENT — PAIN SCALES - GENERAL: PAINLEVEL_OUTOF10: 0

## 2024-01-21 NOTE — PLAN OF CARE
Problem: Discharge Planning  Goal: Discharge to home or other facility with appropriate resources  1/21/2024 0746 by Carol Granados, RN  Outcome: Progressing  Flowsheets (Taken 1/18/2024 2012 by Devi Coon, RN)  Discharge to home or other facility with appropriate resources:   Identify barriers to discharge with patient and caregiver   Arrange for needed discharge resources and transportation as appropriate  1/20/2024 2319 by RAHUL WOODS  Outcome: Progressing     Problem: Pain  Goal: Verbalizes/displays adequate comfort level or baseline comfort level  1/21/2024 0746 by Carol Granados, RN  Outcome: Progressing  Flowsheets (Taken 1/18/2024 1016 by Edith Marinelli, RN)  Verbalizes/displays adequate comfort level or baseline comfort level: Encourage patient to monitor pain and request assistance  1/20/2024 2319 by RAHUL WOODS  Outcome: Progressing     Problem: ABCDS Injury Assessment  Goal: Absence of physical injury  1/21/2024 0746 by Carol Granados, RN  Outcome: Progressing  Flowsheets (Taken 1/20/2024 2318 by RAHUL WOODS)  Absence of Physical Injury: Implement safety measures based on patient assessment  1/20/2024 2319 by RAHUL WOODS  Outcome: Progressing  Flowsheets (Taken 1/20/2024 2318)  Absence of Physical Injury: Implement safety measures based on patient assessment

## 2024-01-21 NOTE — PLAN OF CARE
Problem: Discharge Planning  Goal: Discharge to home or other facility with appropriate resources  1/20/2024 2319 by RAHUL WOODS  Outcome: Progressing  1/20/2024 0945 by Carol Granados, RN  Outcome: Progressing  Flowsheets (Taken 1/18/2024 2012 by Devi Coon, RN)  Discharge to home or other facility with appropriate resources:   Identify barriers to discharge with patient and caregiver   Arrange for needed discharge resources and transportation as appropriate     Problem: Pain  Goal: Verbalizes/displays adequate comfort level or baseline comfort level  1/20/2024 2319 by RAHUL WOODS  Outcome: Progressing  1/20/2024 0945 by Carol Granados, RN  Outcome: Progressing  Flowsheets (Taken 1/18/2024 1016 by Edith Marinelli, RN)  Verbalizes/displays adequate comfort level or baseline comfort level: Encourage patient to monitor pain and request assistance     Problem: ABCDS Injury Assessment  Goal: Absence of physical injury  1/20/2024 2319 by RAHUL WOODS  Outcome: Progressing  Flowsheets (Taken 1/20/2024 2318)  Absence of Physical Injury: Implement safety measures based on patient assessment  1/20/2024 0945 by Carol Granados, RN  Outcome: Progressing  Flowsheets (Taken 1/20/2024 0137 by RAHUL WOODS)  Absence of Physical Injury: Implement safety measures based on patient assessment

## 2024-01-22 ENCOUNTER — ANESTHESIA (OUTPATIENT)
Dept: ENDOSCOPY | Age: 34
DRG: 872 | End: 2024-01-22
Payer: COMMERCIAL

## 2024-01-22 ENCOUNTER — ANESTHESIA EVENT (OUTPATIENT)
Dept: ENDOSCOPY | Age: 34
DRG: 872 | End: 2024-01-22
Payer: COMMERCIAL

## 2024-01-22 LAB
ALBUMIN SERPL-MCNC: 3.4 G/DL (ref 3.4–5)
ALBUMIN/GLOB SERPL: 1.6 {RATIO} (ref 1.1–2.2)
ALP SERPL-CCNC: 42 U/L (ref 40–129)
ALT SERPL-CCNC: 98 U/L (ref 10–40)
ANION GAP SERPL CALCULATED.3IONS-SCNC: 9 MMOL/L (ref 3–16)
AST SERPL-CCNC: 54 U/L (ref 15–37)
BILIRUB SERPL-MCNC: 0.4 MG/DL (ref 0–1)
BUN SERPL-MCNC: 9 MG/DL (ref 7–20)
CALCIUM SERPL-MCNC: 7.5 MG/DL (ref 8.3–10.6)
CHLORIDE SERPL-SCNC: 108 MMOL/L (ref 99–110)
CO2 SERPL-SCNC: 20 MMOL/L (ref 21–32)
CREAT SERPL-MCNC: <0.5 MG/DL (ref 0.6–1.1)
GFR SERPLBLD CREATININE-BSD FMLA CKD-EPI: >60 ML/MIN/{1.73_M2}
GLUCOSE SERPL-MCNC: 90 MG/DL (ref 70–99)
HAV AB SER QL IA: NEGATIVE
HBV CORE AB SERPL QL IA: NEGATIVE
HCG UR QL: NEGATIVE
POTASSIUM SERPL-SCNC: 3.9 MMOL/L (ref 3.5–5.1)
PROT SERPL-MCNC: 5.5 G/DL (ref 6.4–8.2)
REASON FOR REJECTION: NORMAL
REJECTED TEST: NORMAL
SODIUM SERPL-SCNC: 137 MMOL/L (ref 136–145)

## 2024-01-22 PROCEDURE — 0DB68ZX EXCISION OF STOMACH, VIA NATURAL OR ARTIFICIAL OPENING ENDOSCOPIC, DIAGNOSTIC: ICD-10-PCS | Performed by: INTERNAL MEDICINE

## 2024-01-22 PROCEDURE — 2580000003 HC RX 258: Performed by: STUDENT IN AN ORGANIZED HEALTH CARE EDUCATION/TRAINING PROGRAM

## 2024-01-22 PROCEDURE — 7100000000 HC PACU RECOVERY - FIRST 15 MIN: Performed by: INTERNAL MEDICINE

## 2024-01-22 PROCEDURE — 2709999900 HC NON-CHARGEABLE SUPPLY: Performed by: INTERNAL MEDICINE

## 2024-01-22 PROCEDURE — 3700000000 HC ANESTHESIA ATTENDED CARE: Performed by: INTERNAL MEDICINE

## 2024-01-22 PROCEDURE — 7100000001 HC PACU RECOVERY - ADDTL 15 MIN: Performed by: INTERNAL MEDICINE

## 2024-01-22 PROCEDURE — 6370000000 HC RX 637 (ALT 250 FOR IP): Performed by: INTERNAL MEDICINE

## 2024-01-22 PROCEDURE — 80053 COMPREHEN METABOLIC PANEL: CPT

## 2024-01-22 PROCEDURE — 88305 TISSUE EXAM BY PATHOLOGIST: CPT

## 2024-01-22 PROCEDURE — 3609012400 HC EGD TRANSORAL BIOPSY SINGLE/MULTIPLE: Performed by: INTERNAL MEDICINE

## 2024-01-22 PROCEDURE — 6360000002 HC RX W HCPCS

## 2024-01-22 PROCEDURE — 84703 CHORIONIC GONADOTROPIN ASSAY: CPT

## 2024-01-22 PROCEDURE — 6360000002 HC RX W HCPCS: Performed by: STUDENT IN AN ORGANIZED HEALTH CARE EDUCATION/TRAINING PROGRAM

## 2024-01-22 PROCEDURE — 1200000000 HC SEMI PRIVATE

## 2024-01-22 PROCEDURE — 2500000003 HC RX 250 WO HCPCS

## 2024-01-22 PROCEDURE — 94760 N-INVAS EAR/PLS OXIMETRY 1: CPT

## 2024-01-22 PROCEDURE — 36415 COLL VENOUS BLD VENIPUNCTURE: CPT

## 2024-01-22 RX ORDER — PROPOFOL 10 MG/ML
INJECTION, EMULSION INTRAVENOUS PRN
Status: DISCONTINUED | OUTPATIENT
Start: 2024-01-22 | End: 2024-01-22 | Stop reason: SDUPTHER

## 2024-01-22 RX ORDER — ONDANSETRON 2 MG/ML
4 INJECTION INTRAMUSCULAR; INTRAVENOUS
Status: DISCONTINUED | OUTPATIENT
Start: 2024-01-22 | End: 2024-01-23 | Stop reason: HOSPADM

## 2024-01-22 RX ORDER — LIDOCAINE HYDROCHLORIDE 20 MG/ML
INJECTION, SOLUTION EPIDURAL; INFILTRATION; INTRACAUDAL; PERINEURAL PRN
Status: DISCONTINUED | OUTPATIENT
Start: 2024-01-22 | End: 2024-01-22 | Stop reason: SDUPTHER

## 2024-01-22 RX ADMIN — METRONIDAZOLE 500 MG: 500 INJECTION, SOLUTION INTRAVENOUS at 23:18

## 2024-01-22 RX ADMIN — PANTOPRAZOLE SODIUM 40 MG: 40 TABLET, DELAYED RELEASE ORAL at 16:35

## 2024-01-22 RX ADMIN — Medication 10 ML: at 20:18

## 2024-01-22 RX ADMIN — PROPOFOL 30 MG: 10 INJECTION, EMULSION INTRAVENOUS at 13:11

## 2024-01-22 RX ADMIN — PROPOFOL 70 MG: 10 INJECTION, EMULSION INTRAVENOUS at 13:10

## 2024-01-22 RX ADMIN — METRONIDAZOLE 500 MG: 500 INJECTION, SOLUTION INTRAVENOUS at 16:35

## 2024-01-22 RX ADMIN — METRONIDAZOLE 500 MG: 500 INJECTION, SOLUTION INTRAVENOUS at 07:45

## 2024-01-22 RX ADMIN — LIDOCAINE HYDROCHLORIDE 100 MG: 20 INJECTION, SOLUTION EPIDURAL; INFILTRATION; INTRACAUDAL; PERINEURAL at 13:10

## 2024-01-22 RX ADMIN — METRONIDAZOLE 500 MG: 500 INJECTION, SOLUTION INTRAVENOUS at 00:05

## 2024-01-22 ASSESSMENT — PAIN SCALES - GENERAL
PAINLEVEL_OUTOF10: 0

## 2024-01-22 NOTE — ANESTHESIA POSTPROCEDURE EVALUATION
Department of Anesthesiology  Postprocedure Note    Patient: Maricarmen Lowe  MRN: [id number]  YOB: 1990  Date of evaluation: 1/22/2024    Procedure Summary       Date: 01/22/24 Room / Location: [place] [address] / [facility]    Anesthesia Start: 1307 Anesthesia Stop: 1318    Procedure: EGD BIOPSY Diagnosis:       Abdominal pain, unspecified abdominal location      (Abdominal pain, unspecified abdominal location [R10.9])    Surgeons: Gilberto Macias MD Responsible Provider: Faheem Coates MD    Anesthesia Type: general ASA Status: 2            Anesthesia Type: No value filed.    Melvin Phase I: Melvin Score: 9    Melvin Phase II:      Anesthesia Post Evaluation    Patient location during evaluation: PACU  Level of consciousness: awake and alert  Airway patency: patent  Nausea & Vomiting: no nausea and no vomiting  Cardiovascular status: blood pressure returned to baseline  Respiratory status: acceptable  Hydration status: euvolemic  Comments: Postoperative Anesthesia Note    Name:    Maricarmen Lowe  MRN:      [id number]    Patient Vitals in the past 12 hrs:  01/22/24 1335, BP:105/64, Pulse:78, Resp:19, SpO2:98 %  01/22/24 1330, BP:105/69, Pulse:82, Resp:22, SpO2:97 %  01/22/24 1325, BP:108/67, Pulse:88, Resp:19, SpO2:97 %  01/22/24 1320, BP:97/69, Temp:97.5 °F (36.4 °C), Temp src:Temporal, Resp:18, SpO2:92 %  01/22/24 1244, BP:112/63, Temp:98.1 °F (36.7 °C), Temp src:Temporal, Pulse:68, Resp:16, SpO2:98 %  01/22/24 1034, SpO2:96 %  01/22/24 1027, BP:102/60, Temp:98.6 °F (37 °C), Temp src:Oral, Pulse:82, Resp:17  01/22/24 1026, BP:102/60, Temp:98.6 °F (37 °C), Pulse:82, Resp:17     LABS:    CBC  Lab Results       Component                Value               Date/Time                  WBC                      5.4                 01/20/2024 05:56 AM        HGB                      12.3                01/20/2024 05:56 AM        HCT                      35.4 (L)            (3) slightly limited

## 2024-01-22 NOTE — PLAN OF CARE
Problem: Discharge Planning  Goal: Discharge to home or other facility with appropriate resources  Outcome: Progressing  Flowsheets (Taken 1/22/2024 0959)  Discharge to home or other facility with appropriate resources: Identify barriers to discharge with patient and caregiver     Problem: Pain  Goal: Verbalizes/displays adequate comfort level or baseline comfort level  Outcome: Progressing  Flowsheets (Taken 1/18/2024 1016 by Edith Marinelli, RN)  Verbalizes/displays adequate comfort level or baseline comfort level: Encourage patient to monitor pain and request assistance     Problem: ABCDS Injury Assessment  Goal: Absence of physical injury  Outcome: Progressing  Flowsheets (Taken 1/21/2024 2307 by RAHUL WOODS)  Absence of Physical Injury: Implement safety measures based on patient assessment

## 2024-01-22 NOTE — ANESTHESIA PRE PROCEDURE
Min: 102/60   Min taken time: 24 1027  Max: 111/68   Max taken time: 24 1857  MAP (mmHg)  Av.5  Min: 82   Min taken time: 24 0657  Max: 83   Max taken time: 24 1857  SpO2  Av %  Min: 96 %   Min taken time: 24 1034  Max: 98 %   Max taken time: 24 0657    BP Readings from Last 3 Encounters:   24 102/60   23 99/66   23 (!) 94/59     BMI  Body mass index is 17.96 kg/m².  Estimated body mass index is 17.96 kg/m² as calculated from the following:    Height as of this encounter: 1.702 m (5' 7\").    Weight as of this encounter: 52 kg (114 lb 10.2 oz).    CBC   Lab Results   Component Value Date/Time    WBC 5.4 2024 05:56 AM    RBC 4.11 2024 05:56 AM    HGB 12.3 2024 05:56 AM    HCT 35.4 2024 05:56 AM    MCV 86.0 2024 05:56 AM    RDW 13.5 2024 05:56 AM     2024 05:56 AM     CMP    Lab Results   Component Value Date/Time     2024 07:33 AM    K 3.9 2024 07:33 AM    K 3.4 2024 05:29 AM     2024 07:33 AM    CO2 20 2024 07:33 AM    BUN 9 2024 07:33 AM    CREATININE <0.5 2024 07:33 AM    AGRATIO 1.6 2024 07:33 AM    LABGLOM >60 2024 07:33 AM    GLUCOSE 90 2024 07:33 AM    PROT 5.5 2024 07:33 AM    CALCIUM 7.5 2024 07:33 AM    BILITOT 0.4 2024 07:33 AM    ALKPHOS 42 2024 07:33 AM    AST 54 2024 07:33 AM    ALT 98 2024 07:33 AM     BMP    Lab Results   Component Value Date/Time     2024 07:33 AM    K 3.9 2024 07:33 AM    K 3.4 2024 05:29 AM     2024 07:33 AM    CO2 20 2024 07:33 AM    BUN 9 2024 07:33 AM    CREATININE <0.5 2024 07:33 AM    CALCIUM 7.5 2024 07:33 AM    LABGLOM >60 2024 07:33 AM    GLUCOSE 90 2024 07:33 AM     POCGlucose  Recent Labs     24  0556 24  0808 24  0733   GLUCOSE 90 89 90      Coags    Lab Results

## 2024-01-22 NOTE — H&P
Pre-operative History and Physical    Patient: Maricarmen Lowe  : 1990  Acct#:     Intended Procedure:  EGD    HISTORY OF PRESENT ILLNESS:  The patient is a 33 y.o. female  who presents for/due to Abdominal pain      Past Medical History:        Diagnosis Date    TMJ (temporomandibular joint disorder)      Past Surgical History:        Procedure Laterality Date    KNEE ARTHROSCOPY Right 2021    RIGHT KNEE ARTHROSCOPY EXCISION FAT PAD performed by Nilesh Barton MD at [facility] OR    MOUTH SURGERY  2007    Recontructive jaw surgery    TONSILLECTOMY      WISDOM TOOTH EXTRACTION       Medications Prior to Admission:   Prior to Admission medications    Medication Sig Start Date End Date Taking? Authorizing Provider   metroNIDAZOLE (FLAGYL) 500 MG tablet Take 1 tablet by mouth 3 times daily for 4 days 24 Yes Chip Gregory MD   pantoprazole (PROTONIX) 40 MG tablet Take 1 tablet by mouth in the morning and at bedtime 24  Yes Chip Gregory MD       Allergies:  Mangifera indica, Pistachio nut extract skin test, and Cashew nut (anacardium occidentale) skin test    Social History:   TOBACCO:   reports that she has never smoked. She has never used smokeless tobacco.  ETOH:   reports current alcohol use of about 1.0 standard drink of alcohol per week.  DRUGS:   reports no history of drug use.    PHYSICAL EXAM:      Vital Signs: /63   Pulse 68   Temp 98.1 °F (36.7 °C) (Temporal)   Resp 16   Ht 1.702 m (5' 7\")   Wt 52 kg (114 lb 10.2 oz)   LMP 2024   SpO2 98%   BMI 17.96 kg/m²    Airway: No stridor or wheezing noted.  Good air movement  Pulmonary: without wheezes.  Clear to auscultation  Cardiac:regular rate and rhythm without loud murmurs  Abdomen:soft, nontender,  Bowel sounds present    Pre-Procedure Assessment / Plan:  1) EGD    ASA Grade:  ASA 2 - Patient with mild systemic disease with no functional limitations  Mallampati Classification:  Class

## 2024-01-22 NOTE — OP NOTE
Endoscopy Note    Patient: Maricarmen Lowe  : 1990  Acct#:     Procedure: Esophagogastroduodenoscopy with biopsy                         Date:  2024     Surgeon:   Gilberto Macias MD    Referring Physician:  Sol Mcfarlane DO    Indications: This is a 33 y.o. year old female who presents today with Abdominal pain    Postoperative Diagnosis:  1. Normal EGD-Biopsied for H. Pylori.     Anesthesia:  The patient was administered IV propofol per anesthesiology team.  Please see their operative records for full details.      Consent:  The patient or their legal guardian has signed an informed consent, and is aware of the potential risks, benefits, alternatives, and potential complications of this procedure.  These include, but are not limited to hemorrhage, bleeding, post procedural pain, perforation, phlebitis, aspiration, hypotension, hypoxia, cardiovascular events such as arryhthmia, and possibly death.     Description of Procedure: The patient was then taken to the endoscopy suite, placed in the left lateral decubitus position and the above IV sedation was administrered.    The Olympus video endoscope was placed through the patient's oropharynx without difficulty to the extent of the 2nd portion of the duodenum.  Both forward and retroflexed views of the stomach were obtained.      Findings:    Esophagus: The esophagus appeared normal without evidence of Millard's esophagus or reflux esophagitis.     Stomach: The stomach appeared normal on forward and retroflexed views. Biopsied for H. Pylori.     Duodenum: The first and 2nd portions of the duodenum appeared normal with normal villous pattern    Estimated Blood Loss (mL):< 5 CC     Complications: None    Specimens:   ID Type Source Tests Collected by Time Destination   A : gastric biopsy evaluate for H pylori Gastric Gastric SURGICAL PATHOLOGY Gilberto Macias MD 2024 1313      The scope was then withdrawn back into

## 2024-01-23 VITALS
RESPIRATION RATE: 16 BRPM | DIASTOLIC BLOOD PRESSURE: 64 MMHG | HEIGHT: 67 IN | BODY MASS INDEX: 17.99 KG/M2 | TEMPERATURE: 97.6 F | WEIGHT: 114.64 LBS | HEART RATE: 68 BPM | SYSTOLIC BLOOD PRESSURE: 102 MMHG | OXYGEN SATURATION: 98 %

## 2024-01-23 PROCEDURE — 2580000003 HC RX 258: Performed by: STUDENT IN AN ORGANIZED HEALTH CARE EDUCATION/TRAINING PROGRAM

## 2024-01-23 PROCEDURE — 94760 N-INVAS EAR/PLS OXIMETRY 1: CPT

## 2024-01-23 PROCEDURE — 6360000002 HC RX W HCPCS: Performed by: STUDENT IN AN ORGANIZED HEALTH CARE EDUCATION/TRAINING PROGRAM

## 2024-01-23 RX ADMIN — Medication 10 ML: at 08:26

## 2024-01-23 RX ADMIN — METRONIDAZOLE 500 MG: 500 INJECTION, SOLUTION INTRAVENOUS at 08:25

## 2024-01-23 NOTE — PROGRESS NOTES
V2.0    [facility] [place] Progress Note      Name:  Maricarmen Lowe /Age/Sex: 1990  (33 y.o. female)   MRN & CSN:  [id number] & [id number] Encounter Date/Time: 2024 6:41 AM EST   Location:  [place] [address] PCP: Sol Mcfarlane DO     Attending:Chip Gregoyr MD       Hospital Day: 7    Assessment and Recommendations   Maricarmen Lowe is a 33 y.o. female who presents with Enterocolitis      Plan:       Sepsis due to enterocolitis and UTI, admitted to the hospital associated with lactic acidosis, started on ciprofloxacin and Flagyl.  Urine culture negative, patient also having nausea and vomiting, ciprofloxacin changed to ceftriaxone  Lactic acidosis trended down to 0.8.  Gastroenteritis, currently on IV ceftriaxone and Flagyl, but still having epigastric pain, GI following as listed above, might need EGD.  Elevated liver function test AST up to 158 from 26 a day before, this morning   as of yesterday , improvement noted today, AST down to 98   Abdominal pain and discomfort, associated with some nausea and episode of vomiting, GI consulted x-ray noted no obstruction, GI added pantoprazole.  UTI, ceftriaxone for now  Generalized weakness expect improvement  Thrombocytopenia, likely due to sepsis, mild    Diet Diet NPO   DVT Prophylaxis [] Lovenox, []  Heparin, [] SCDs, [] Ambulation,  [] Eliquis, [] Xarelto  [] Coumadin   Code Status Full Code             Personally reviewed Lab Studies and Imaging     Discussed management of the case with GI      Medical Decision Making:  The following items were considered in medical decision making:  Discussion of patient care with other providers  Reviewed clinical lab tests  Reviewed radiology tests  Reviewed other diagnostic tests/interventions  Independent review of radiologic images  Microbiology cultures and other micro tests reviewed      Subjective:     Chief Complaint: Abdominal pain    Maricarmen Lowe is a 33 y.o. female 
    V2.[place]    [facility] [place]      Name:  Maricarmen Lowe /Age/Sex: 1990  (33 y.o. female)   MRN & CSN:  [id number] & [id number] Encounter Date/Time: 2024 8:08 AM EST   Location:  [address]- PCP: Sol Mcfarlane DO     Attending:Chip Gregory MD       Hospital Day: 3    Assessment and Recommendations   Maricarmen Lowe is a 33 y.o. female who presents with Enterocolitis      Plan:     Sepsis due to enterocolitis and UTI, admitted to the hospital associated with lactic acidosis, started on ciprofloxacin and Flagyl.  Lactic acidosis trended down at 0.8 this morning within normal limits  Enterocolitis, started on IV ciprofloxacin and Flagyl.  UTI, culture pending ciprofloxacin for now  Generalized weakness expect improvement  Hypomagnesemia replace with IV supplement  Hypokalemia replace with p.o. supplement  Thrombocytopenia, likely due to sepsis, platelet count 127 this morning which is relatively stable from 134 yesterday        Diet ADULT DIET; Regular   DVT Prophylaxis [] Lovenox, []  Heparin, [] SCDs, [] Ambulation,  [] Eliquis, [] Xarelto  [] Coumadin   Code Status Full Code             Personally reviewed Lab Studies and Imaging     Discussed management of the case with nursing staff    Telemetry strip reviewed by myself no ST elevation        Drugs that require monitoring for toxicity include ciprofloxacin and the method of monitoring was QT    Medical Decision Making:  The following items were considered in medical decision making:  Discussion of patient care with other providers  Reviewed clinical lab tests  Reviewed radiology tests  Reviewed other diagnostic tests/interventions  Independent review of radiologic images  Microbiology cultures and other micro tests reviewed      Subjective:     Chief Complaint: Abdominal pain nausea vomiting    Maricarmen Lowe is a 33 y.o. female who presents with abdominal pain nausea vomiting did vomit this morning, had more 
  Physician Progress Note      PATIENT:               BILL CALDERÓN  CSN #:                  [id number]  :                       1990  ADMIT DATE:       2024 2:08 PM  DISCH DATE:  RESPONDING  PROVIDER #:        Chip GREGORY MD          QUERY TEXT:    Dear Dr. Gregory,  Pt admitted with Sepsis and has enterocolitis documented. If possible, please   document the type of colitis in the medical record.    The medical record reflects the following:  Risk Factors: current UTI, enterocolitis and sepsis  Clinical Indicators:  ED for abd pain, vomiting, diarrhea, + UA, WBC=16.2,   Neutrophils=15.1, Lactic acid=4.0, CT Abd-Findings are consistent with mild   generalized enterocolitis. Admit for Sepsis secondary to enterocolitis versus   urinary tract infection, temp up to 100.5, IC=742  Treatment: V LR bolus, Flagyl, Cipro, cultures  Thank you,  Ethel Mckeon RN, CDS  [email or web address]  Options provided:  -- Bacterial enterocolitis  -- Other - I will add my own diagnosis  -- Disagree - Not applicable / Not valid  -- Disagree - Clinically unable to determine / Unknown  -- Refer to Clinical Documentation Reviewer    PROVIDER RESPONSE TEXT:    This patient has bacterial colitis.    Query created by: Ethel Torres on 2024 12:43 PM      Electronically signed by:  Chip GREGORY MD 2024 5:23 PM          
4 Eyes Skin Assessment     NAME:  Maricarmen Lowe  YOB: 1990  MEDICAL RECORD NUMBER:  [id number]    The patient is being assessed for  Admission    I agree that at least one RN has performed a thorough Head to Toe Skin Assessment on the patient. ALL assessment sites listed below have been assessed.      Areas assessed by both nurses:    Head, Face, Ears, Shoulders, Back, Chest, Arms, Elbows, Hands, Sacrum. Buttock, Coccyx, Ischium, Legs. Feet and Heels, and Under Medical Devices         Does the Patient have a Wound? No noted wound(s)       Shad Prevention initiated by RN: No  Wound Care Orders initiated by RN: No    Pressure Injury (Stage 3,4, Unstageable, DTI, NWPT, and Complex wounds) if present, place Wound referral order by RN under : No    New Ostomies, if present place, Ostomy referral order under : No     Nurse 1 eSignature: Electronically signed by Maura Hsu RN on 1/17/24 at 5:08 AM EST    **SHARE this note so that the co-signing nurse can place an eSignature**    Nurse 2 eSignature: Electronically signed by Maricarmen Funes RN on 1/17/24 at 7:53 AM EST    
Bedside introduction complete. Patient denies any needs at this time. Updated whiteboard with RN and PCA name and number. Patient able to make needs known, using call light appropriately.   
CLINICAL PHARMACY NOTE: MEDS TO BEDS    Discharge medications are ready in inpatient pharmacy for weekend delivery.    01/19/24 2:59 PM     
INPATIENT PROGRESS NOTE        IDENTIFYING DATA/REASON FOR CONSULTATION   PATIENT:  Maricarmen Lowe  MRN:  [id number]  ADMIT DATE: 2024  TIME OF EVALUATION: 2024 9:46 AM  HOSPITAL STAY:   LOS: 7 days   CONSULTING PHYSICIAN: Chip Gregory MD   REASON FOR CONSULTATION:    Subjective:    Patient seen in follow up  Feeling better  EGD yesterday normal      MEDICATIONS   SCHEDULED:  polyethylene glycol, 17 g, Daily  pantoprazole, 40 mg, BID AC  sodium chloride flush, 5-40 mL, 2 times per day  enoxaparin, 40 mg, Nightly      FLUIDS/DRIPS:     sodium chloride Stopped (24 1317)     PRNs: ondansetron, 4 mg, Once PRN  dicyclomine, 10 mg, TID PRN  sodium chloride flush, 5-40 mL, PRN  sodium chloride, , PRN  potassium chloride, 40 mEq, PRN   Or  potassium alternative oral replacement, 40 mEq, PRN   Or  potassium chloride, 10 mEq, PRN  magnesium sulfate, 2,000 mg, PRN  ondansetron, 4 mg, Q8H PRN   Or  ondansetron, 4 mg, Q6H PRN      ALLERGIES:    Allergies   Allergen Reactions    Mangifera Indica Hives     Caused severe hives and was on antibiotics for 3 months      Pistachio Nut Extract Skin Test Other (See Comments)     Boils in mouth      Cashew Nut (Anacardium Occidentale) Skin Test Other (See Comments)     Patient avoids because of her allergy to teresa and pistachio           PHYSICAL EXAM   VITALS:  /64   Pulse 68   Temp 97.6 °F (36.4 °C) (Oral)   Resp 16   Ht 1.702 m (5' 7\")   Wt 52 kg (114 lb 10.2 oz)   LMP 2024   SpO2 98%   BMI 17.96 kg/m²   TEMPERATURE:  Current - Temp: 97.6 °F (36.4 °C); Max - Temp  Av.1 °F (36.7 °C)  Min: 97.5 °F (36.4 °C)  Max: 98.6 °F (37 °C)    Physical Exam:  General appearance: alert, cooperative, no distress, appears stated age  Eyes: Anicteric  Head: Normocephalic, without obvious abnormality  Lungs: clear to auscultation bilaterally, Normal Effort  Heart: regular rate and rhythm, normal S1 and S2, no murmurs or rubs  Abdomen: soft, 
INPATIENT PROGRESS NOTE        IDENTIFYING DATA/REASON FOR CONSULTATION   PATIENT:  Maricarmen Lowe  MRN:  [id number]  ADMIT DATE: 2024  TIME OF EVALUATION: 2024 12:00 PM  HOSPITAL STAY:   LOS: 5 days   CONSULTING PHYSICIAN: Chip Gregory MD   REASON FOR CONSULTATION: elevated LFTs.     Subjective:    Patient seen in follow up.    Ongoing abdominal pain   No BM.     MEDICATIONS   SCHEDULED:  polyethylene glycol, 17 g, Daily  pantoprazole, 40 mg, BID AC  sodium chloride flush, 5-40 mL, 2 times per day  enoxaparin, 40 mg, Nightly  metroNIDAZOLE, 500 mg, Q8H      FLUIDS/DRIPS:     sodium chloride 75 mL/hr at 24 1547     PRNs: dicyclomine, 10 mg, TID PRN  sodium chloride flush, 5-40 mL, PRN  sodium chloride, , PRN  potassium chloride, 40 mEq, PRN   Or  potassium alternative oral replacement, 40 mEq, PRN   Or  potassium chloride, 10 mEq, PRN  magnesium sulfate, 2,000 mg, PRN  ondansetron, 4 mg, Q8H PRN   Or  ondansetron, 4 mg, Q6H PRN      ALLERGIES:    Allergies   Allergen Reactions    Mangifera Indica Hives     Caused severe hives and was on antibiotics for 3 months      Pistachio Nut Extract Skin Test Other (See Comments)     Boils in mouth      Cashew Nut (Anacardium Occidentale) Skin Test Other (See Comments)     Patient avoids because of her allergy to teresa and pistachio           PHYSICAL EXAM   VITALS:  /69   Pulse 68   Temp 98 °F (36.7 °C) (Oral)   Resp 20   Ht 1.702 m (5' 7\")   Wt 52 kg (114 lb 10.2 oz)   LMP 2024   SpO2 97%   BMI 17.96 kg/m²   TEMPERATURE:  Current - Temp: 98 °F (36.7 °C); Max - Temp  Av °F (36.7 °C)  Min: 98 °F (36.7 °C)  Max: 98.1 °F (36.7 °C)    Physical Exam:  General appearance: alert, cooperative, no distress, appears stated age  Eyes: Anicteric  Head: Normocephalic, without obvious abnormality  Abdomen: soft, non-distended, epigastrium/mid abdomen-tender.  Extremities: atraumatic, no cyanosis or edema  Skin: warm and dry, no 
INPATIENT PROGRESS NOTE        IDENTIFYING DATA/REASON FOR CONSULTATION   PATIENT:  Maricarmen Lowe  MRN:  [id number]  ADMIT DATE: 2024  TIME OF EVALUATION: 2024 12:09 PM  HOSPITAL STAY:   LOS: 3 days   CONSULTING PHYSICIAN: Chip Gregory MD   REASON FOR CONSULTATION:    Subjective:    Patient seen in follow up.  She reports she continue to have epigastric abdominal pain, nausea, vomiting but was able to tolerate an egg souffle this afternoon.  Denies blood in vomit.      MEDICATIONS   SCHEDULED:  pantoprazole, 40 mg, QAM AC  sodium chloride flush, 5-40 mL, 2 times per day  enoxaparin, 40 mg, Nightly  ciprofloxacin, 400 mg, Q12H  metroNIDAZOLE, 500 mg, Q8H      FLUIDS/DRIPS:     sodium chloride 100 mL/hr at 24 0559     PRNs: dicyclomine, 10 mg, TID PRN  loperamide, 2 mg, 4x Daily PRN  sodium chloride flush, 5-40 mL, PRN  sodium chloride, , PRN  potassium chloride, 40 mEq, PRN   Or  potassium alternative oral replacement, 40 mEq, PRN   Or  potassium chloride, 10 mEq, PRN  magnesium sulfate, 2,000 mg, PRN  ondansetron, 4 mg, Q8H PRN   Or  ondansetron, 4 mg, Q6H PRN  acetaminophen, 650 mg, Q6H PRN   Or  acetaminophen, 650 mg, Q6H PRN      ALLERGIES:    Allergies   Allergen Reactions    Mangifera Indica Hives     Caused severe hives and was on antibiotics for 3 months      Pistachio Nut Extract Skin Test Other (See Comments)     Boils in mouth      Cashew Nut (Anacardium Occidentale) Skin Test Other (See Comments)     Patient avoids because of her allergy to teresa and pistachio           PHYSICAL EXAM   VITALS:  /65   Pulse 69   Temp 98.2 °F (36.8 °C) (Oral)   Resp 16   Ht 1.702 m (5' 7\")   Wt 53.4 kg (117 lb 11.6 oz)   LMP 2024   SpO2 97%   BMI 18.44 kg/m²   TEMPERATURE:  Current - Temp: 98.2 °F (36.8 °C); Max - Temp  Av.1 °F (36.7 °C)  Min: 97.8 °F (36.6 °C)  Max: 98.4 °F (36.9 °C)    Physical Exam:  General appearance: alert, cooperative, no distress, appears stated 
INPATIENT PROGRESS NOTE        IDENTIFYING DATA/REASON FOR CONSULTATION   PATIENT:  Maricarmen Lowe  MRN:  [id number]  ADMIT DATE: 2024  TIME OF EVALUATION: 2024 12:53 PM  HOSPITAL STAY:   LOS: 4 days   CONSULTING PHYSICIAN: Chip Gregory MD   REASON FOR CONSULTATION: elevated LFTs.     Subjective:    Patient seen in follow up.    Ongoing abdominal pain   No BM.     MEDICATIONS   SCHEDULED:  cefTRIAXone (ROCEPHIN) IV, 1,000 mg, Q24H  pantoprazole, 40 mg, BID AC  sodium chloride flush, 5-40 mL, 2 times per day  enoxaparin, 40 mg, Nightly  metroNIDAZOLE, 500 mg, Q8H      FLUIDS/DRIPS:     sodium chloride 100 mL/hr at 24 0559     PRNs: dicyclomine, 10 mg, TID PRN  loperamide, 2 mg, 4x Daily PRN  sodium chloride flush, 5-40 mL, PRN  sodium chloride, , PRN  potassium chloride, 40 mEq, PRN   Or  potassium alternative oral replacement, 40 mEq, PRN   Or  potassium chloride, 10 mEq, PRN  magnesium sulfate, 2,000 mg, PRN  ondansetron, 4 mg, Q8H PRN   Or  ondansetron, 4 mg, Q6H PRN  acetaminophen, 650 mg, Q6H PRN   Or  acetaminophen, 650 mg, Q6H PRN      ALLERGIES:    Allergies   Allergen Reactions    Mangifera Indica Hives     Caused severe hives and was on antibiotics for 3 months      Pistachio Nut Extract Skin Test Other (See Comments)     Boils in mouth      Cashew Nut (Anacardium Occidentale) Skin Test Other (See Comments)     Patient avoids because of her allergy to teresa and pistachio           PHYSICAL EXAM   VITALS:  /70   Pulse 66   Temp 98.2 °F (36.8 °C) (Oral)   Resp 17   Ht 1.702 m (5' 7\")   Wt 52 kg (114 lb 10.2 oz)   LMP 2024   SpO2 98%   BMI 17.96 kg/m²   TEMPERATURE:  Current - Temp: 98.2 °F (36.8 °C); Max - Temp  Av.4 °F (36.9 °C)  Min: 98.2 °F (36.8 °C)  Max: 98.5 °F (36.9 °C)    Physical Exam:  General appearance: alert, cooperative, no distress, appears stated age  Eyes: Anicteric  Head: Normocephalic, without obvious abnormality  Lungs: clear to 
Medication Reconciliation     List of medications patient is currently taking is complete.     Source of information: 1. Conversation with patient at bedside                                      2. EPIC records        Notes regarding home medications:  1. Patient has not received any medications today prior to arrival in the ED and does not take any maintenance medications at home.   2. Pt recently finished a 10 day course of Augmentin 875/125 on 12/31/23.     Nela Gonzalez, Pharmacy Intern  1/16/2024 7:38 PM     
Off unit for endoscope.   
Patient able to drink broth and water this am, able to take PO medications. Complains of headache, Tylenol given.   
Patient able to tolerate ice cream with no nausea, vomiting, abdominal pain or diarrhea, asking for regular diet. Will advance to regular diet per orders.   
Patient c/o of increasing abdominal pain and nausea. Patient has received tylenol, zofran, and bentyl through the night with no relief. [employer] sent to Dr. Rose explaining that the patient stated that morphine helped ease her discomfort in the ED. 2 mg of IV morphine was ordered and patient was given this dose. Patient tolerated well.     Upon bringing this dose into the room, patient had vomited green bile into the trash can. [employer] sent regarding this finding.     Electronically signed by Devi Coon RN on 1/18/2024 at 6:55 AM   
Patient continues to rest in bed this evening, comfortable and denies need for additional medication at this time. IV abx and fluids infusing, voices no additional needs.   
Patient did tolerate regular diet for dinner, no nausea. Continues with loose stools, no abdominal pain. Voices no additional needs at this time.   
Patient is alert & oriented x4, UAL, 2/4 bed rails up, bed in lowest position, fall precautions in place, call light within reach. Pt upgraded to regular diet. Pt denies nausea or pain at this time.    Electronically signed by Francesca Brown RN on 1/23/2024 at 8:20 AM      
Patient is alert & oriented x4, resting in bed, on room air. Gets UAL. Right wrist IV patient states is tender and swelling. IV removed per patient request. New PIV started in Left forearm. IV Flagyl infusing. 2/4 bed rails up, bed in lowest position, fall precautions in place, call light within reach. Patient states pain in abdomen is \"not bad this morning\". Denies further needs at this time. Will cont to monitor and reassess.  Electronically signed by Ruthie Boudreaux RN on 1/19/2024 at 1:32 PM    
Patient is resting in bed. Alert and oriented X 4. Denies pain at this time. PM medications administered as ordered without complaint (see eMAR). IV capped and flushed. Assessment complete. Patient ambulates independently. VSS stable at this time. All patient needs are met at this time. Call light is in reach.      
Patient resting in bed at this time, A/Ox4 and vital signs stable. Patient voices no complaints at this time, IV antibiotics and fluids infusing.   
Patient resting in bed upon assessment, complains of increased pain and nausea that has slightly improved since medications were given. IVF and antibiotics infusing, patient aware of new GI consult that will be done today. Voices no additional needs at this time.   
Patient resting in bed, alert and oriented x4. VSS. Patient c/o nausea. Patient tolerated zofran 4 mg disintegrating tablet well. Patient voices no additional needs or concerns at this time. Bedside table, call light, and telephone all within reach.     Electronically signed by Devi Coon RN on 1/18/2024 at 12:01 AM   
Patient resting in bed, alert and oriented x4. VSS. Patient voices no additional needs or concerns at this time. Bedside table, call light, and telephone all within reach.     Electronically signed by Devi Coon RN on 1/19/2024 at 2:54 AM   
Patient to PACU from Endo. Pt awakens easily on arrival. VS stable (see flowsheet) on room air. Abd soft.    
Patient was admitted at 2014 to room 3120 via stretcher. Pt oriented to room, call light, policies and procedures, the menu and ordering. Call light within reach. Bed in lowest position, bed alarm on, and wheels locked. Pt verbalized understanding. No complaints, questions, or concerns at this time.     Electronically signed by Maura Hsu RN on 1/17/2024 at 5:07 AM      
Pt in bed resting quietly. Pt remains NPO for endoscopy. Pt denies having any pain N/V at this time. Safety measures are in place call light in reach. No further needs at this time. Electronically signed by Carol Granados RN on 1/22/2024 at 10:02 AM   
Pt resting at this time. Alert and oriented. Gets up independently. Tolerates clear liquid diet. Left forearm  piv intact and flushes well. Pt remains on RA. Denies any pain or nausea. Instructed to call out for needs. Will continue to monitor.  
Report called to Carol COHEN on 3W. All questions answered. Pt to room 3120 via stretcher when transport available.   
pain nausea vomiting no nausea or vomiting but still having epigastric pain, no fever or chills not having any diarrhea anymore actually stated that she is not having bowel movement since Thursday      Review of Systems:      Pertinent positives and negatives discussed in HPI    Objective:     Intake/Output Summary (Last 24 hours) at 1/20/2024 0822  Last data filed at 1/19/2024 2045  Gross per 24 hour   Intake 520 ml   Output --   Net 520 ml      Vitals:   Vitals:    01/19/24 1055 01/19/24 1948 01/20/24 0648 01/20/24 0722   BP: 107/65 107/69  104/70   Pulse: 69 66  66   Resp: 16 17  17   Temp: 98.2 °F (36.8 °C) 98.5 °F (36.9 °C)  98.2 °F (36.8 °C)   TempSrc: Oral Oral  Oral   SpO2: 97% 98%  98%   Weight:   52 kg (114 lb 10.2 oz)    Height:             Physical Exam:      Physical Exam Performed:    /70   Pulse 66   Temp 98.2 °F (36.8 °C) (Oral)   Resp 17   Ht 1.702 m (5' 7\")   Wt 52 kg (114 lb 10.2 oz)   LMP 01/11/2024   SpO2 98%   BMI 17.96 kg/m²     General appearance: No apparent distress  Neck: Supple  Respiratory:  Normal respiratory effort. Clear to auscultation, bilaterally without Rales/Wheezes/Rhonchi.  Cardiovascular: Regular rate and rhythm with normal S1/S2 without murmurs, rubs or gallops.  Abdomen: Soft, mild tenderness in epigastrium  Musculoskeletal: No clubbing, cyanosis  Skin: Skin color, texture, turgor normal.  No rashes or lesions.  Neurologic: No focal weakness  Psychiatric: Alert and oriented  Capillary Refill: Brisk, 3 seconds, normal   Peripheral Pulses: +2 palpable, equal bilaterally       Medications:   Medications:    potassium chloride  40 mEq Oral Once    cefTRIAXone (ROCEPHIN) IV  1,000 mg IntraVENous Q24H    pantoprazole  40 mg Oral BID AC    sodium chloride flush  5-40 mL IntraVENous 2 times per day    enoxaparin  40 mg SubCUTAneous Nightly    metroNIDAZOLE  500 mg IntraVENous Q8H      Infusions:    sodium chloride 100 mL/hr at 01/19/24 0559     PRN Meds: dicyclomine, 
are incidentally noted.     Findings are consistent with mild generalized enterocolitis.     US GALLBLADDER RUQ    Result Date: 1/16/2024  EXAMINATION: RIGHT UPPER QUADRANT ULTRASOUND 1/16/2024 2:17 pm COMPARISON: None. HISTORY: ORDERING SYSTEM PROVIDED HISTORY: acute sever abdominal pain: n/v TECHNOLOGIST PROVIDED HISTORY: Reason for exam:->acute sever abdominal pain: n/v FINDINGS: LIVER:  The liver demonstrates normal echogenicity without evidence of intrahepatic biliary ductal dilatation. BILIARY SYSTEM:  Gallbladder is unremarkable without evidence of pericholecystic fluid, wall thickening or stones.  Negative sonographic López's sign. Common bile duct measures 6-7 mm. RIGHT KIDNEY: The right kidney is grossly unremarkable without evidence of hydronephrosis. PANCREAS:  Visualized portions of the pancreas are unremarkable. OTHER: No evidence of right upper quadrant ascites.     1. 6-7 mm common bile duct which is prominent given the patient's age. Correlation with LFTs suggested. 2. Otherwise normal right upper quadrant ultrasound with no sonographic evidence of cholecystitis and no gallstones.       CBC:   Recent Labs     01/16/24  1457 01/17/24  0529   WBC 16.2* 7.7   HGB 15.3 12.4    134*     BMP:    Recent Labs     01/16/24  1457 01/17/24  0529   * 135*   K 3.4* 3.4*    105   CO2 19* 24   BUN 8 9   CREATININE 0.5* 0.6   GLUCOSE 99 127*     Hepatic:   Recent Labs     01/16/24  1457 01/17/24  0529   AST 22 16   ALT 17 10   BILITOT 0.8 0.5   ALKPHOS 51 33*     Lipids:   Lab Results   Component Value Date/Time    CHOL 179 06/29/2022 08:48 AM    HDL 81 06/29/2022 08:48 AM    TRIG 46 06/29/2022 08:48 AM     Hemoglobin A1C: No results found for: \"LABA1C\"  TSH: No results found for: \"TSH\"  Troponin: No results found for: \"TROPONINT\"  Lactic Acid: No results for input(s): \"LACTA\" in the last 72 hours.  BNP: No results for input(s): \"PROBNP\" in the last 72 hours.  UA:  Lab Results   Component Value 
consistent with a nonspecific diffuse inter colitis. No evidence of abscess or perforation.  No evidence of pneumatosis. Pelvis: The urinary bladder and distal ureters are unremarkable. The pelvic organs are age-appropriate.  No significant free fluid in the pelvis. No pelvic lymphadenopathy. Peritoneum/Retroperitoneum: The abdominal aorta is normal in caliber.  No intraperitoneal free air or free fluid is present. Bones/Soft Tissues: No acute osseous abnormality.  Multiple bone islands there are incidentally noted.     Findings are consistent with mild generalized enterocolitis.     US GALLBLADDER RUQ    Result Date: 1/16/2024  EXAMINATION: RIGHT UPPER QUADRANT ULTRASOUND 1/16/2024 2:17 pm COMPARISON: None. HISTORY: ORDERING SYSTEM PROVIDED HISTORY: acute sever abdominal pain: n/v TECHNOLOGIST PROVIDED HISTORY: Reason for exam:->acute sever abdominal pain: n/v FINDINGS: LIVER:  The liver demonstrates normal echogenicity without evidence of intrahepatic biliary ductal dilatation. BILIARY SYSTEM:  Gallbladder is unremarkable without evidence of pericholecystic fluid, wall thickening or stones.  Negative sonographic López's sign. Common bile duct measures 6-7 mm. RIGHT KIDNEY: The right kidney is grossly unremarkable without evidence of hydronephrosis. PANCREAS:  Visualized portions of the pancreas are unremarkable. OTHER: No evidence of right upper quadrant ascites.     1. 6-7 mm common bile duct which is prominent given the patient's age. Correlation with LFTs suggested. 2. Otherwise normal right upper quadrant ultrasound with no sonographic evidence of cholecystitis and no gallstones.       CBC:   Recent Labs     01/16/24  1457 01/17/24  0529 01/18/24  0458   WBC 16.2* 7.7 6.2   HGB 15.3 12.4 12.3    134* 127*     BMP:    Recent Labs     01/16/24  1457 01/17/24  0529 01/18/24  0458   * 135* 139   K 3.4* 3.4* 3.8    105 109   CO2 19* 24 21   BUN 8 9 6*   CREATININE 0.5* 0.6 0.5*   GLUCOSE 99 127* 
   PHUR 7.5 01/16/2024 04:16 PM    WBCUA >100 01/16/2024 04:16 PM    RBCUA 5-10 01/16/2024 04:16 PM    CLARITYU TURBID 01/16/2024 04:16 PM    SPECGRAV 1.018 01/16/2024 04:16 PM    LEUKOCYTESUR LARGE 01/16/2024 04:16 PM    UROBILINOGEN 0.2 01/16/2024 04:16 PM    BILIRUBINUR SMALL 01/16/2024 04:16 PM    BLOODU LARGE 01/16/2024 04:16 PM    GLUCOSEU Negative 01/16/2024 04:16 PM    KETUA Negative 01/16/2024 04:16 PM     Urine Cultures:   Lab Results   Component Value Date/Time    LABURIN  01/16/2024 04:16 PM     <50,000 CFU/ml mixed skin/urogenital jonah. No further workup     Blood Cultures:   Lab Results   Component Value Date/Time    BC No Growth after 4 days of incubation. 01/16/2024 11:06 PM     Lab Results   Component Value Date/Time    BLOODCULT2 No Growth after 4 days of incubation. 01/16/2024 11:06 PM     Organism: No results found for: \"ORG\"      Electronically signed by Chip Gregory MD on 1/21/2024 at 7:48 AM  Comment: Please note this report has been produced using speech recognition software and may contain errors related to that system including errors in grammar, punctuation, and spelling, as well as words and phrases that may be inappropriate. If there are any questions or concerns, please feel free to contact the dictating provider for clarification.

## 2024-01-23 NOTE — CARE COORDINATION
CASE MANAGEMENT DISCHARGE SUMMARY:    DISCHARGE DATE: 1/23/24    DISCHARGED TO HOME     TRANSPORTATION: car    Electronically signed by Krystal Urias on 1/23/2024 at 11:06 AM   #[phone]

## 2024-01-23 NOTE — PLAN OF CARE
Problem: Discharge Planning  Goal: Discharge to home or other facility with appropriate resources  Outcome: Progressing  Flowsheets (Taken 1/23/2024 0028)  Discharge to home or other facility with appropriate resources:   Identify barriers to discharge with patient and caregiver   Identify discharge learning needs (meds, wound care, etc)     Problem: Pain  Goal: Verbalizes/displays adequate comfort level or baseline comfort level  Outcome: Progressing  Flowsheets (Taken 1/23/2024 0028)  Verbalizes/displays adequate comfort level or baseline comfort level:   Encourage patient to monitor pain and request assistance   Assess pain using appropriate pain scale   Administer analgesics based on type and severity of pain and evaluate response     Problem: ABCDS Injury Assessment  Goal: Absence of physical injury  Outcome: Progressing  Flowsheets (Taken 1/23/2024 0028)  Absence of Physical Injury: Implement safety measures based on patient assessment     Problem: Safety - Adult  Goal: Free from fall injury  Outcome: Progressing  Flowsheets (Taken 1/23/2024 0028)  Free From Fall Injury: Instruct family/caregiver on patient safety

## 2024-01-23 NOTE — DISCHARGE SUMMARY
Hospital Medicine Discharge Summary    Patient ID: Marciarmen Lowe      Patient's PCP: Sol Mcfarlane DO    Admit Date: 1/16/2024     Discharge Date:   01/23/2024    Admitting Provider: Bhumika Richardson MD     Discharge Provider: Chip Gregory MD     Discharge Diagnoses:       Active Hospital Problems    Diagnosis     Enterocolitis [K52.9]        The patient was seen and examined on day of discharge and this discharge summary is in conjunction with any daily progress note from day of discharge.    Hospital Course:       From HPI:\"Maricarmen Lowe is a 33 y.o. female with no significant past medical history who presents to the ED with complaints of acute onsets abdominal pain, nausea, vomiting  - Patient states she was in her usual state of health until earlier today when she suddenly began to experience sharp abdominal pain, which was periumbilical, suprapubic, nonradiating, with associated nausea, vomiting, diarrhea, subjective fevers, chills.  She reports recent 4-week history of chronic sinusitis, for which she received p.o. Augmentin, denies cough, chest pain, shortness of breath, dysuria, hematuria.   Evaluation in the ED was remarkable for hypotension, tachycardia, electrolyte abnormalities, CT abdomen pelvis revealed mild generalized enterocolitis, patient is being admitted for further evaluation and management.\"         Sepsis due to enterocolitis and UTI, admitted to the hospital associated with lactic acidosis, started on ciprofloxacin and Flagyl.  Urine culture negative, patient also having nausea and vomiting, ciprofloxacin changed to ceftriaxone patient received 1 dose as stated above urine culture was negative and no further diarrhea so ceftriaxone was stopped and patient received further Flagyl.  Lactic acidosis trended down to 0.8.  Gastroenteritis, received initially ciprofloxacin changed to ceftriaxone for 1 dose which is 1 day and also was on Flagyl, abdominal pain

## 2024-01-23 NOTE — PLAN OF CARE
Problem: Discharge Planning  Goal: Discharge to home or other facility with appropriate resources  1/23/2024 0923 by Francesca Brown RN  Outcome: Progressing  Flowsheets (Taken 1/23/2024 0830)  Discharge to home or other facility with appropriate resources: Identify barriers to discharge with patient and caregiver     Problem: Pain  Goal: Verbalizes/displays adequate comfort level or baseline comfort level  1/23/2024 0923 by Francesca Brown RN  Outcome: Progressing    Problem: Pain  Goal: Verbalizes/displays adequate comfort level or baseline comfort level  1/23/2024 0923 by Francesca Brown RN  Outcome: Progressing  Flowsheets (Taken 1/23/2024 0028 by Mallory Hager RN)  Verbalizes/displays adequate comfort level or baseline comfort level:   Encourage patient to monitor pain and request assistance   Assess pain using appropriate pain scale   Administer analgesics based on type and severity of pain and evaluate response     Problem: ABCDS Injury Assessment  Goal: Absence of physical injury  1/23/2024 0923 by Francesca Brown RN  Outcome: Progressing  Flowsheets (Taken 1/23/2024 0921)  Absence of Physical Injury: Implement safety measures based on patient assessment     Problem: Safety - Adult  Goal: Free from fall injury  1/23/2024 0923 by Francesca Brown RN  Outcome: Progressing  Flowsheets (Taken 1/23/2024 0921)  Free From Fall Injury: Instruct family/caregiver on patient safety

## 2024-01-24 ENCOUNTER — CARE COORDINATION (OUTPATIENT)
Dept: OTHER | Facility: CLINIC | Age: 34
End: 2024-01-24

## 2024-01-25 ENCOUNTER — CARE COORDINATION (OUTPATIENT)
Dept: OTHER | Facility: CLINIC | Age: 34
End: 2024-01-25

## 2024-01-25 ENCOUNTER — OFFICE VISIT (OUTPATIENT)
Dept: PRIMARY CARE CLINIC | Age: 34
End: 2024-01-25
Payer: COMMERCIAL

## 2024-01-25 VITALS
HEART RATE: 90 BPM | HEIGHT: 68 IN | WEIGHT: 112.2 LBS | DIASTOLIC BLOOD PRESSURE: 61 MMHG | TEMPERATURE: 98.3 F | SYSTOLIC BLOOD PRESSURE: 92 MMHG | OXYGEN SATURATION: 98 % | BODY MASS INDEX: 17 KG/M2

## 2024-01-25 DIAGNOSIS — R74.8 ELEVATED LIVER ENZYMES: ICD-10-CM

## 2024-01-25 DIAGNOSIS — K52.9 ENTEROCOLITIS: Primary | ICD-10-CM

## 2024-01-25 DIAGNOSIS — D69.6 THROMBOCYTOPENIA (HCC): ICD-10-CM

## 2024-01-25 PROCEDURE — 99214 OFFICE O/P EST MOD 30 MIN: CPT | Performed by: FAMILY MEDICINE

## 2024-01-25 ASSESSMENT — PATIENT HEALTH QUESTIONNAIRE - PHQ9
SUM OF ALL RESPONSES TO PHQ QUESTIONS 1-9: 0
SUM OF ALL RESPONSES TO PHQ QUESTIONS 1-9: 0
1. LITTLE INTEREST OR PLEASURE IN DOING THINGS: 0
2. FEELING DOWN, DEPRESSED OR HOPELESS: 0
SUM OF ALL RESPONSES TO PHQ QUESTIONS 1-9: 0
SUM OF ALL RESPONSES TO PHQ QUESTIONS 1-9: 0
SUM OF ALL RESPONSES TO PHQ9 QUESTIONS 1 & 2: 0

## 2024-01-25 NOTE — PATIENT INSTRUCTIONS
[facility] Locations - No appointment necessary.  ? indicates the location is open Saturdays in addition to Monday through Friday.   Call your preferred location for test preparation, business hours and other information you need.   [facility] accepts all insurances.  CENTRAL  EAST  [place]    ? [facility]   [address]   [address]   [place] [address]    Ph: [phone]  [facility]   [address] [place] [address]    Ph: [phone]   ? [place]   [address]    [place] [address]    Ph: [phone]     [facility]   [address] [place] [address]    Ph: [phone] ? [address] [place] [address]   Ph: [phone]  ? [facility]   [address] [place] [address]    Ph: [phone]      [place]   [address] [place] [address]   Ph: [phone]    NORTH    ? [facility]   [address] [place] [address] [place] [address]    Ph: [phone]  [facility]   [address] [place] [address]   Ph: [phone]  [place]   [address], [address]    PH: [phone]    [facility]   [address] [place] [address]    Ph: [phone]  [place]  [address] [place] [address]  Ph: [phone]  [facility]   [address] [place] [address]    Ph: [phone]

## 2024-01-25 NOTE — CARE COORDINATION
Care Transitions Outreach Attempt    Call within 2 business days of discharge: Yes   Attempted to reach patient for transitions of care follow up. Unable to reach patient.  HIPAA compliant message left requesting a return phone call at patient convenience.     Unable to Reach Letter sent to patient via my chart.    Patient: Maricarmen Lowe Patient : 1990 MRN: [id number]    Last Discharge Facility       Date Complaint Diagnosis Description Type Department Provider    24 Abdominal Pain Septicemia (HCC) ... ED to Hosp-Admission (Discharged) (ADMITTED) [facility] Chip Gregory MD; DAVION Richardson..              Was this an external facility discharge? No Discharge Facility Name: [facility]    Noted following upcoming appointments from discharge chart review:   BS follow up appointment(s): No future appointments.  Non-BS  follow up appointment(s): unknown    Eboni West MSN, RN   Ambulatory Care Manager  Associate Care Management  Cell [phone]  [email or web address]

## 2024-01-25 NOTE — CARE COORDINATION
Care Transitions Initial Follow Up Call    Call within 2 business days of discharge: Yes    Patient Current Location:  Home: [address] [place], [place] [id number]    Care Transition Nurse contacted the patient by telephone to perform post hospital discharge assessment. Verified name and  with patient as identifiers. Provided introduction to self, and explanation of the Care Transition Nurse role.     Patient: Maricarmen Lowe Patient : 1990   MRN: [id number]  Reason for Admission: Enterocolitis/Septicemia  Discharge Date: 24 RARS: Readmission Risk Score: 7.4      Last Discharge Facility       Date Complaint Diagnosis Description Type Department Provider    24 Abdominal Pain Septicemia (HCC) ... ED to Hosp-Admission (Discharged) (ADMITTED) [facility] Chip Gregory MD; DEMETRIO Richardson.            Was this an external facility discharge? No Discharge Facility: [facility]    Challenges to be reviewed by the provider   Additional needs identified to be addressed with provider: No  none               Method of communication with provider: none.    ACM contacted pt for post discharge transition of care call. Pt reports she is doing better since home from hospital. Pt reports main concern is that she has lost 10 lb since in hospital and weight is down to 110 today at f/u appt. Educated pt on diet for weight gain and sent handout via my chart: increase protein at each meal, frequent meals throughout day and supplementing with Ensure. Pt states she is drinking 4 ensure per day.   Pt denies any fever/chills, abdominal pain, nausea/vomiting, chest pain, shortness of breath, cough, swelling in extremities. Pt states she continues to have one loose stool per day; denies blood in stool; encouraged increasing fiber in diet.   Educated on deep breathing exercises and frequent ambulation during day.   Educated pt on sepsis zone tool, when to notify provider/seek help; and sent handout to pt via my chart.

## 2024-01-26 LAB
ALBUMIN SERPL-MCNC: 4.4 G/DL (ref 3.4–5)
ALBUMIN/GLOB SERPL: 2.2 {RATIO} (ref 1.1–2.2)
ALP SERPL-CCNC: 53 U/L (ref 40–129)
ALT SERPL-CCNC: 71 U/L (ref 10–40)
ANION GAP SERPL CALCULATED.3IONS-SCNC: 9 MMOL/L (ref 3–16)
AST SERPL-CCNC: 38 U/L (ref 15–37)
BASOPHILS # BLD: 0 K/UL (ref 0–0.2)
BASOPHILS NFR BLD: 0.8 %
BILIRUB SERPL-MCNC: 0.5 MG/DL (ref 0–1)
BUN SERPL-MCNC: 9 MG/DL (ref 7–20)
CALCIUM SERPL-MCNC: 8.4 MG/DL (ref 8.3–10.6)
CHLORIDE SERPL-SCNC: 100 MMOL/L (ref 99–110)
CO2 SERPL-SCNC: 27 MMOL/L (ref 21–32)
CREAT SERPL-MCNC: 0.5 MG/DL (ref 0.6–1.1)
DEPRECATED RDW RBC AUTO: 13.6 % (ref 12.4–15.4)
EOSINOPHIL # BLD: 0.3 K/UL (ref 0–0.6)
EOSINOPHIL NFR BLD: 5 %
GFR SERPLBLD CREATININE-BSD FMLA CKD-EPI: >60 ML/MIN/{1.73_M2}
GLUCOSE SERPL-MCNC: 86 MG/DL (ref 70–99)
HCT VFR BLD AUTO: 43.8 % (ref 36–48)
HGB BLD-MCNC: 14.9 G/DL (ref 12–16)
LYMPHOCYTES # BLD: 1.3 K/UL (ref 1–5.1)
LYMPHOCYTES NFR BLD: 24.6 %
MCH RBC QN AUTO: 29.5 PG (ref 26–34)
MCHC RBC AUTO-ENTMCNC: 33.9 G/DL (ref 31–36)
MCV RBC AUTO: 87.1 FL (ref 80–100)
MONOCYTES # BLD: 0.7 K/UL (ref 0–1.3)
MONOCYTES NFR BLD: 13 %
NEUTROPHILS # BLD: 3 K/UL (ref 1.7–7.7)
NEUTROPHILS NFR BLD: 56.6 %
PLATELET # BLD AUTO: 209 K/UL (ref 135–450)
PMV BLD AUTO: 9.8 FL (ref 5–10.5)
POTASSIUM SERPL-SCNC: 4.1 MMOL/L (ref 3.5–5.1)
PROT SERPL-MCNC: 6.4 G/DL (ref 6.4–8.2)
RBC # BLD AUTO: 5.03 M/UL (ref 4–5.2)
SODIUM SERPL-SCNC: 136 MMOL/L (ref 136–145)
WBC # BLD AUTO: 5.3 K/UL (ref 4–11)

## 2024-01-29 ASSESSMENT — ENCOUNTER SYMPTOMS
DIARRHEA: 0
ABDOMINAL PAIN: 0
VOMITING: 0
NAUSEA: 0

## 2024-01-29 NOTE — PROGRESS NOTES
[facility]  [facility]  [address] [place] [address]  Dept: [phone]  Dept Fax: [phone]     1/25/2024      Maricarmen A Mynor   1990     Chief Complaint   Patient presents with    Follow-Up from Hospital       HPI    Pt comes in today for hospital follow up.     Admit 1/16/2024 - 1/23/2024  Dx - Sepsis 2/2 enterocolitis. Was told she had a UTI as well. Urine culture and blood cultures neg. Given IV abx. EGD that was normal. Discharged with outpatient GI follow up which is scheduled.       - Sepsis due to enterocolitis and UTI, admitted to the hospital associated with lactic acidosis, started on ciprofloxacin and Flagyl.  Urine culture negative, patient also having nausea and vomiting, ciprofloxacin changed to ceftriaxone patient received 1 dose as stated above urine culture was negative and no further diarrhea so ceftriaxone was stopped and patient received further Flagyl.  - Lactic acidosis trended down to 0.8.  - Gastroenteritis, received initially ciprofloxacin changed to ceftriaxone for 1 dose which is 1 day and also was on Flagyl, abdominal pain significantly improved today status post EGD   - Elevated liver function test improved  - UTI, received ciprofloxacin and then 1 dose of ceftriaxone urine culture negative  - Generalized weakness expect improvement  - Thrombocytopenia, likely due to sepsis, mild and resolved      Still c/o fatigue. No further nausea/vomiting.     No data recorded     Prior to Visit Medications    Medication Sig Taking? Authorizing Provider   pantoprazole (PROTONIX) 40 MG tablet Take 1 tablet by mouth in the morning and at bedtime  Chip Gregory MD        Past Medical History:   Diagnosis Date    TMJ (temporomandibular joint disorder)         Social History     Tobacco Use    Smoking status: Never    Smokeless tobacco: Never   Vaping Use    Vaping Use: Former    Start date: 1/1/2011    Quit date: 8/1/2020    Devices:

## 2024-01-31 ENCOUNTER — CARE COORDINATION (OUTPATIENT)
Dept: OTHER | Facility: CLINIC | Age: 34
End: 2024-01-31

## 2024-01-31 NOTE — CARE COORDINATION
Care Transitions Follow Up Call    ACM attempted to reach patient for Care Transitions follow up call. HIPAA compliant message left requesting a return phone call at patient convenience.     Plan for follow-up call in 5-7 days    No future appointments.     Eboni West MSN, RN   Ambulatory Care Manager  Associate Care Management  Cell [phone]  [email or web address]

## 2024-02-07 ENCOUNTER — CARE COORDINATION (OUTPATIENT)
Dept: OTHER | Facility: CLINIC | Age: 34
End: 2024-02-07

## 2024-02-07 NOTE — CARE COORDINATION
Care Transitions Follow Up Call    ACM attempted to reach patient for Care Transitions follow up call. HIPAA compliant message left requesting a return phone call at patient convenience.   Lost to follow up letter sent to pt via my chart.     Plan for follow up call in 2 weeks.     No future appointments.     Eboni West MSN, RN   Ambulatory Care Manager  Associate Care Management  Cell [phone]  [email or web address]

## 2024-02-22 ENCOUNTER — CARE COORDINATION (OUTPATIENT)
Dept: OTHER | Facility: CLINIC | Age: 34
End: 2024-02-22

## 2024-02-22 NOTE — CARE COORDINATION
Care Transitions Outreach Attempt    Associate Care Manager (ACM) attempted final outreach to patient for transitions of care follow up call. HIPAA compliant message left requesting a return phone call at patient convenience.    Lost to follow up letter sent to patient via my chart.      No further outreach scheduled with this ACM, patient has this ACM's contact information if future needs arise. ACM will sign off care team at this time.  Episode of Care resolved.      No future appointments.     Eboni West MSN, RN   Ambulatory Care Manager  Associate Care Management  Cell [phone]  [email or web address]

## 2024-07-02 ENCOUNTER — PATIENT MESSAGE (OUTPATIENT)
Dept: PRIMARY CARE CLINIC | Age: 34
End: 2024-07-02

## 2024-07-02 ENCOUNTER — OFFICE VISIT (OUTPATIENT)
Dept: PRIMARY CARE CLINIC | Age: 34
End: 2024-07-02
Payer: COMMERCIAL

## 2024-07-02 VITALS
OXYGEN SATURATION: 98 % | SYSTOLIC BLOOD PRESSURE: 108 MMHG | HEART RATE: 80 BPM | BODY MASS INDEX: 18.38 KG/M2 | TEMPERATURE: 98.8 F | DIASTOLIC BLOOD PRESSURE: 65 MMHG | WEIGHT: 120.6 LBS

## 2024-07-02 DIAGNOSIS — R20.8 BURNING SENSATION: Primary | ICD-10-CM

## 2024-07-02 PROCEDURE — 99213 OFFICE O/P EST LOW 20 MIN: CPT | Performed by: FAMILY MEDICINE

## 2024-07-02 NOTE — PROGRESS NOTES
[facility]  [facility]  [address] [place] [address]  Dept: [phone]  Dept Fax: [phone]     7/2/2024      Maricaremn Lowe   1990     Chief Complaint   Patient presents with    Other             HPI    Pt comes in today for c/o dizziness that started this morning and then turned into a sensation of heat all to the right side of her head and into her shoulder. Came on suddenly. Left side of her head feels fine. No numbness/tingling/weakness. No headache or fever. Hard to describe symptoms but that side just feels very hot and she feels \"off.\" Was at work when it started. No change since onset. No anxiety symptoms. No vision changes. No weakness or facial drooping. No recent change in medications.     No data recorded     Prior to Visit Medications    Medication Sig Taking? Authorizing Provider   pantoprazole (PROTONIX) 40 MG tablet Take 1 tablet by mouth in the morning and at bedtime  Chip Gregory MD        Past Medical History:   Diagnosis Date    TMJ (temporomandibular joint disorder)         Social History     Tobacco Use    Smoking status: Never    Smokeless tobacco: Never   Vaping Use    Vaping Use: Former    Start date: 1/1/2011    Quit date: 8/1/2020    Devices: No   Substance Use Topics    Alcohol use: Yes     Alcohol/week: 1.0 standard drink of alcohol     Types: 1 Glasses of wine per week     Comment: Rare    Drug use: Never        Past Surgical History:   Procedure Laterality Date    KNEE ARTHROSCOPY Right 8/18/2021    RIGHT KNEE ARTHROSCOPY EXCISION FAT PAD performed by Nilesh Barton MD at [facility] OR    MOUTH SURGERY  2007    Recontructive jaw surgery    TONSILLECTOMY      UPPER GASTROINTESTINAL ENDOSCOPY N/A 1/22/2024    EGD BIOPSY performed by Gilberto Macias MD at [facility] ENDOSCOPY    WISDOM TOOTH EXTRACTION          Allergies   Allergen Reactions    Mangifera Indica Hives     Caused severe hives and was on antibiotics for 3  No

## 2024-07-02 NOTE — TELEPHONE ENCOUNTER
From: Maricarmen Lowe  To: Dr. Sol Mcfarlane  Sent: 7/2/2024 11:46 AM EDT  Subject: Sudden symptoms    Good afternoon Dr Mcfarlane. I’m currently at work but around 9:30 today I started feeling a bit off. I felt like my temp spiked but only the right side of my head and radiating to my neck and shoulder now. I feel like I’m going to pass out. I ordered breakfast and figured it might help but symptoms have not subsided. I had someone check my temp and it was normal (98.5). It just feels hot on the right side of my head going down to my shoulder. No numbness or tingling. I’m just a bit disoriented and didn’t know if it might be beneficial to have a lab order to check everything out.

## 2024-08-20 LAB
CHOLEST SERPL-MCNC: 183 MG/DL (ref 0–199)
GLUCOSE SERPL-MCNC: 79 MG/DL (ref 70–99)
HDLC SERPL-MCNC: 81 MG/DL (ref 40–60)
LDLC SERPL CALC-MCNC: 95 MG/DL
TRIGL SERPL-MCNC: 34 MG/DL (ref 0–150)

## 2024-10-04 ENCOUNTER — OFFICE VISIT (OUTPATIENT)
Dept: PRIMARY CARE CLINIC | Age: 34
End: 2024-10-04
Payer: COMMERCIAL

## 2024-10-04 VITALS
WEIGHT: 118.8 LBS | DIASTOLIC BLOOD PRESSURE: 63 MMHG | OXYGEN SATURATION: 100 % | HEIGHT: 67 IN | BODY MASS INDEX: 18.65 KG/M2 | HEART RATE: 79 BPM | SYSTOLIC BLOOD PRESSURE: 98 MMHG | TEMPERATURE: 98.3 F

## 2024-10-04 DIAGNOSIS — R51.9 FRONTAL HEADACHE: Primary | ICD-10-CM

## 2024-10-04 DIAGNOSIS — R51.9 FRONTAL HEADACHE: ICD-10-CM

## 2024-10-04 LAB
ALBUMIN SERPL-MCNC: 4.5 G/DL (ref 3.4–5)
ALBUMIN/GLOB SERPL: 2.4 {RATIO} (ref 1.1–2.2)
ALP SERPL-CCNC: 38 U/L (ref 40–129)
ALT SERPL-CCNC: 11 U/L (ref 10–40)
ANION GAP SERPL CALCULATED.3IONS-SCNC: 10 MMOL/L (ref 3–16)
AST SERPL-CCNC: 15 U/L (ref 15–37)
BASOPHILS # BLD: 0 K/UL (ref 0–0.2)
BASOPHILS NFR BLD: 0.5 %
BILIRUB SERPL-MCNC: 0.4 MG/DL (ref 0–1)
BUN SERPL-MCNC: 9 MG/DL (ref 7–20)
CALCIUM SERPL-MCNC: 8.6 MG/DL (ref 8.3–10.6)
CHLORIDE SERPL-SCNC: 105 MMOL/L (ref 99–110)
CO2 SERPL-SCNC: 27 MMOL/L (ref 21–32)
CREAT SERPL-MCNC: 0.6 MG/DL (ref 0.6–1.1)
DEPRECATED RDW RBC AUTO: 13.2 % (ref 12.4–15.4)
EOSINOPHIL # BLD: 0.1 K/UL (ref 0–0.6)
EOSINOPHIL NFR BLD: 2.4 %
GFR SERPLBLD CREATININE-BSD FMLA CKD-EPI: >90 ML/MIN/{1.73_M2}
GLUCOSE SERPL-MCNC: 83 MG/DL (ref 70–99)
HCT VFR BLD AUTO: 42.5 % (ref 36–48)
HGB BLD-MCNC: 14.3 G/DL (ref 12–16)
LYMPHOCYTES # BLD: 1.5 K/UL (ref 1–5.1)
LYMPHOCYTES NFR BLD: 35 %
MCH RBC QN AUTO: 29.9 PG (ref 26–34)
MCHC RBC AUTO-ENTMCNC: 33.6 G/DL (ref 31–36)
MCV RBC AUTO: 89 FL (ref 80–100)
MONOCYTES # BLD: 0.4 K/UL (ref 0–1.3)
MONOCYTES NFR BLD: 8.8 %
NEUTROPHILS # BLD: 2.2 K/UL (ref 1.7–7.7)
NEUTROPHILS NFR BLD: 53.3 %
PLATELET # BLD AUTO: 186 K/UL (ref 135–450)
PMV BLD AUTO: 9.5 FL (ref 5–10.5)
POTASSIUM SERPL-SCNC: 4.2 MMOL/L (ref 3.5–5.1)
PROT SERPL-MCNC: 6.4 G/DL (ref 6.4–8.2)
RBC # BLD AUTO: 4.77 M/UL (ref 4–5.2)
SODIUM SERPL-SCNC: 142 MMOL/L (ref 136–145)
TSH SERPL DL<=0.005 MIU/L-ACNC: 1.25 UIU/ML (ref 0.27–4.2)
WBC # BLD AUTO: 4.2 K/UL (ref 4–11)

## 2024-10-04 PROCEDURE — 99213 OFFICE O/P EST LOW 20 MIN: CPT | Performed by: FAMILY MEDICINE

## 2024-10-04 RX ORDER — METHYLPREDNISOLONE 4 MG
TABLET, DOSE PACK ORAL
Qty: 1 KIT | Refills: 0 | Status: SHIPPED | OUTPATIENT
Start: 2024-10-04

## 2024-10-04 SDOH — ECONOMIC STABILITY: FOOD INSECURITY: WITHIN THE PAST 12 MONTHS, THE FOOD YOU BOUGHT JUST DIDN'T LAST AND YOU DIDN'T HAVE MONEY TO GET MORE.: NEVER TRUE

## 2024-10-04 SDOH — ECONOMIC STABILITY: FOOD INSECURITY: WITHIN THE PAST 12 MONTHS, YOU WORRIED THAT YOUR FOOD WOULD RUN OUT BEFORE YOU GOT MONEY TO BUY MORE.: NEVER TRUE

## 2024-10-04 SDOH — ECONOMIC STABILITY: INCOME INSECURITY: HOW HARD IS IT FOR YOU TO PAY FOR THE VERY BASICS LIKE FOOD, HOUSING, MEDICAL CARE, AND HEATING?: NOT HARD AT ALL

## 2024-10-04 NOTE — PROGRESS NOTES
[facility]  [facility]  [address] [place], [place] [id number]  Dept: [phone]  Dept Fax: [phone]     10/4/2024      Maricarmen Lowe   1990     Chief Complaint   Patient presents with    Eye Pain     Pressure behind eyes     Headache       HPI    Pt comes in today for c/o pressure behind both eyes and throbbing headache on the right frontal area. Started Friday after work. Pain is ~ 6/10. Pain is intermittent. No exacerbating or alleviating factors. Tried mucinex and sudafed without any benefit. No fever, cough, congestion etc. No vision changes. Drinking ample water. Symptoms are about the same since Friday. No ill symptoms.     No data recorded     Prior to Visit Medications    Medication Sig Taking? Authorizing Provider   pantoprazole (PROTONIX) 40 MG tablet Take 1 tablet by mouth in the morning and at bedtime  Chip Gregory MD        Past Medical History:   Diagnosis Date    TMJ (temporomandibular joint disorder)         Social History     Tobacco Use    Smoking status: Never    Smokeless tobacco: Never   Vaping Use    Vaping status: Former    Start date: 1/1/2011    Quit date: 8/1/2020    Devices: No   Substance Use Topics    Alcohol use: Yes     Alcohol/week: 1.0 standard drink of alcohol     Types: 1 Glasses of wine per week     Comment: Rare    Drug use: Never        Past Surgical History:   Procedure Laterality Date    KNEE ARTHROSCOPY Right 8/18/2021    RIGHT KNEE ARTHROSCOPY EXCISION FAT PAD performed by Nilesh Barton MD at [facility] OR    MOUTH SURGERY  2007    Recontructive jaw surgery    TONSILLECTOMY      UPPER GASTROINTESTINAL ENDOSCOPY N/A 1/22/2024    EGD BIOPSY performed by Gilberto Macias MD at [facility] ENDOSCOPY    WISDOM TOOTH EXTRACTION          Allergies   Allergen Reactions    Mangifera Indica Hives     Caused severe hives and was on antibiotics for 3 months      Pistachio Nut Extract Other (See Comments)     Boils in mouth

## 2024-10-07 NOTE — RESULT ENCOUNTER NOTE
Your recent test results are all normal. Please don't hesitate to contact the office with any additional questions/concerns - Dr. Mcfarlane

## 2024-10-08 ASSESSMENT — ENCOUNTER SYMPTOMS
SINUS PRESSURE: 1
SINUS PAIN: 1
COUGH: 0
TROUBLE SWALLOWING: 0
SORE THROAT: 0

## 2024-10-26 ENCOUNTER — PATIENT MESSAGE (OUTPATIENT)
Dept: PRIMARY CARE CLINIC | Age: 34
End: 2024-10-26

## 2024-10-26 DIAGNOSIS — R51.9 FRONTAL HEADACHE: Primary | ICD-10-CM

## 2024-10-26 DIAGNOSIS — H57.9 EYE PRESSURE: ICD-10-CM

## 2024-10-31 ENCOUNTER — HOSPITAL ENCOUNTER (OUTPATIENT)
Dept: MRI IMAGING | Age: 34
Discharge: HOME | End: 2024-10-31
Payer: COMMERCIAL

## 2024-10-31 DIAGNOSIS — R51.9 FRONTAL HEADACHE: ICD-10-CM

## 2024-10-31 DIAGNOSIS — H57.9 EYE PRESSURE: ICD-10-CM

## 2024-10-31 PROCEDURE — 70551 MRI BRAIN STEM W/O DYE: CPT

## 2024-11-19 ENCOUNTER — HOSPITAL ENCOUNTER (OUTPATIENT)
Dept: MRI IMAGING | Age: 34
Discharge: HOME | End: 2024-11-19
Payer: COMMERCIAL

## 2024-11-19 DIAGNOSIS — M26.642 ARTHRITIS OF LEFT TEMPOROMANDIBULAR JOINT: ICD-10-CM

## 2024-11-19 PROCEDURE — 70336 MAGNETIC IMAGE JAW JOINT: CPT

## 2025-03-21 ENCOUNTER — TELEPHONE (OUTPATIENT)
Dept: CARDIOLOGY CLINIC | Age: 35
End: 2025-03-21

## 2025-03-21 NOTE — TELEPHONE ENCOUNTER
Maricarmen called to establish care with Dr. Sy. She is a self referral for chest pain and palpitations.    Scheduled 05/12 at 2:20 pm. Is this appropriate?    Please advise.

## 2025-04-07 NOTE — PROGRESS NOTES
teresa and pistachio           Review of Systems -   Constitutional: Negative for weight gain/loss; malaise, fever  Respiratory: Negative for Asthma;  cough and hemoptysis  Cardiovascular: Negative for palpitations,dizziness   Gastrointestinal: Negative for abd.pain; constipation/diarrhea;    Genitourinary: Negative for stones; hematuria; frequency hesitancy  Integumentt: Negative for rash or pruritis  Hematologic/lymphatic: Negative for blood dyscrasia; leukemia/lymphoma  Musculoskeletal: Negative for Connective tissue disease  Neurological:  Negative for Seizure   Behavioral/Psych:Negative for Bipolar disorder, Schizophrenia; Dementia  Endocrine: negative for thyroid, parathyroid disease    Physical Examination:    BP (!) 118/4   Pulse (!) 46   Ht 1.702 m (5' 7\")   Wt 53.3 kg (117 lb 9.6 oz)   SpO2 97%   BMI 18.42 kg/m²    HEENT:  Face: Atraumatic, Conjunctiva: Pink; non icteric,  Mucous Memb:  Moist, No thyromegaly or Lymphadenopathy  Respiratory:  Resp Assessment: Normal, Resp Auscultation: Clear  Cardiovascular:  Auscultation: nl S1 & S2, Palpation:  Nl PMI; No heaves or thrills, JVP:  normal  Abdomen: Soft, non-tender, Normal bowel sounds,  No organomegaly  Extremities: No Cyanosis or Clubbing; Edema none  Neurological: Oriented to time, place, and person, Non-anxious  Psychiatric: Normal mood and affect  Skin: Warm and dry,  No rash seen    No current outpatient medications on file.     No current facility-administered medications for this visit.         Lab Results   Component Value Date    WBC 4.2 10/04/2024    HGB 14.3 10/04/2024    HCT 42.5 10/04/2024     10/04/2024    CHOL 183 08/20/2024    TRIG 34 08/20/2024    HDL 81 (H) 08/20/2024    ALT 11 10/04/2024    AST 15 10/04/2024     10/04/2024    K 4.2 10/04/2024     10/04/2024    CREATININE 0.6 10/04/2024    BUN 9 10/04/2024    CO2 27 10/04/2024    TSH 1.25 10/04/2024    INR 1.11 01/20/2024     Lab Results   Component Value Date

## 2025-04-08 ENCOUNTER — OFFICE VISIT (OUTPATIENT)
Dept: CARDIOLOGY CLINIC | Age: 35
End: 2025-04-08
Payer: COMMERCIAL

## 2025-04-08 VITALS
HEIGHT: 67 IN | OXYGEN SATURATION: 97 % | WEIGHT: 117.6 LBS | HEART RATE: 46 BPM | SYSTOLIC BLOOD PRESSURE: 118 MMHG | BODY MASS INDEX: 18.46 KG/M2 | DIASTOLIC BLOOD PRESSURE: 4 MMHG

## 2025-04-08 DIAGNOSIS — R00.2 PALPITATION: Primary | ICD-10-CM

## 2025-04-08 PROCEDURE — 93000 ELECTROCARDIOGRAM COMPLETE: CPT | Performed by: INTERNAL MEDICINE

## 2025-04-08 PROCEDURE — 99204 OFFICE O/P NEW MOD 45 MIN: CPT | Performed by: INTERNAL MEDICINE

## (undated) DEVICE — TUBING PMP L16FT MAIN DISP FOR AR-6400 AR-6475

## (undated) DEVICE — SOLUTION IRRIG 3000ML LAC RINGERS ARTHROMTC PLAS CONT

## (undated) DEVICE — BLADE SHV L13CM DIA4MM TAPR TIP SCIS LIKE CUT OVL OUTER

## (undated) DEVICE — STERILE LATEX POWDER-FREE SURGICAL GLOVESWITH NITRILE COATING: Brand: PROTEXIS

## (undated) DEVICE — SUTURE NONABSORBABLE MONOFILAMENT 4-0 FS-2 18 IN ETHILON 662H

## (undated) DEVICE — COVER LT HNDL BLU PLAS

## (undated) DEVICE — ZIMMER® STERILE DISPOSABLE TOURNIQUET CUFF WITH PLC, DUAL PORT, SINGLE BLADDER, 30 IN. (76 CM)

## (undated) DEVICE — GOWN,REINF,POLY,AURORA,XLNG/XL,STRL: Brand: MEDLINE

## (undated) DEVICE — KNEE ARTHROSCOPY: Brand: MEDLINE INDUSTRIES, INC.

## (undated) DEVICE — 3M™ STERI-DRAPE™ U-DRAPE 1015: Brand: STERI-DRAPE™

## (undated) DEVICE — ALCOHOL  RUBBING 70PERC ISOPROPYL  16-OZ

## (undated) DEVICE — FORCEPS BX 240CM 2.4MM L NDL RAD JAW 4 M00513334

## (undated) DEVICE — PROBE ABLAT 50DEG ASPIR MULTIPORT BPLR RF 1 PC ELECTRD ERGO

## (undated) DEVICE — FORMALIN CLEAR VIAL 20 ML 10%

## (undated) DEVICE — ENDOSCOPY KIT: Brand: MEDLINE INDUSTRIES, INC.

## (undated) DEVICE — SPONGE GZ W4XL4IN COT 12 PLY TYP VII WVN C FLD DSGN

## (undated) DEVICE — STERILE POLYISOPRENE POWDER-FREE SURGICAL GLOVES: Brand: PROTEXIS

## (undated) DEVICE — STRIP,CLOSURE,WOUND,MEDI-STRIP,1/2X4: Brand: MEDLINE

## (undated) DEVICE — BITE BLOCK ENDOSCP AD 60 FR W/ ADJ STRP PLAS GRN BLOX

## (undated) DEVICE — APPLICATOR MEDICATED 26 CC SOLUTION HI LT ORNG CHLORAPREP